# Patient Record
Sex: MALE | Race: WHITE | NOT HISPANIC OR LATINO | Employment: FULL TIME | URBAN - METROPOLITAN AREA
[De-identification: names, ages, dates, MRNs, and addresses within clinical notes are randomized per-mention and may not be internally consistent; named-entity substitution may affect disease eponyms.]

---

## 2022-05-09 ENCOUNTER — HOSPITAL ENCOUNTER (INPATIENT)
Facility: HOSPITAL | Age: 47
LOS: 11 days | Discharge: NURSING, REHAB OR LONG-TERM CARE | End: 2022-05-20
Attending: EMERGENCY MEDICINE | Admitting: INTERNAL MEDICINE

## 2022-05-09 ENCOUNTER — APPOINTMENT (OUTPATIENT)
Dept: GENERAL RADIOLOGY | Facility: HOSPITAL | Age: 47
End: 2022-05-09

## 2022-05-09 DIAGNOSIS — Z74.09 IMMOBILITY: ICD-10-CM

## 2022-05-09 DIAGNOSIS — L72.0 SUBCUTANEOUS CYSTS, GENERALIZED: ICD-10-CM

## 2022-05-09 DIAGNOSIS — M54.9 INTRACTABLE BACK PAIN: Primary | ICD-10-CM

## 2022-05-09 PROBLEM — E87.6 HYPOKALEMIA: Status: ACTIVE | Noted: 2022-05-09

## 2022-05-09 PROBLEM — F10.10 ETOH ABUSE: Status: ACTIVE | Noted: 2022-05-09

## 2022-05-09 PROBLEM — Z72.0 TOBACCO ABUSE: Status: ACTIVE | Noted: 2022-05-09

## 2022-05-09 PROBLEM — R12 HEART BURN: Status: ACTIVE | Noted: 2022-05-09

## 2022-05-09 LAB
ALBUMIN SERPL-MCNC: 3.5 G/DL (ref 3.5–5.2)
ALBUMIN/GLOB SERPL: 1.1 G/DL
ALP SERPL-CCNC: 117 U/L (ref 39–117)
ALT SERPL W P-5'-P-CCNC: 47 U/L (ref 1–41)
ANION GAP SERPL CALCULATED.3IONS-SCNC: 14.3 MMOL/L (ref 5–15)
AST SERPL-CCNC: 99 U/L (ref 1–40)
BASOPHILS # BLD AUTO: 0.05 10*3/MM3 (ref 0–0.2)
BASOPHILS NFR BLD AUTO: 0.6 % (ref 0–1.5)
BILIRUB SERPL-MCNC: 0.7 MG/DL (ref 0–1.2)
BUN SERPL-MCNC: 3 MG/DL (ref 6–20)
BUN/CREAT SERPL: 4.8 (ref 7–25)
CALCIUM SPEC-SCNC: 8.2 MG/DL (ref 8.6–10.5)
CHLORIDE SERPL-SCNC: 101 MMOL/L (ref 98–107)
CO2 SERPL-SCNC: 22.7 MMOL/L (ref 22–29)
CREAT SERPL-MCNC: 0.62 MG/DL (ref 0.76–1.27)
DEPRECATED RDW RBC AUTO: 49.1 FL (ref 37–54)
EGFRCR SERPLBLD CKD-EPI 2021: 118.6 ML/MIN/1.73
EOSINOPHIL # BLD AUTO: 0.08 10*3/MM3 (ref 0–0.4)
EOSINOPHIL NFR BLD AUTO: 1 % (ref 0.3–6.2)
ERYTHROCYTE [DISTWIDTH] IN BLOOD BY AUTOMATED COUNT: 14.1 % (ref 12.3–15.4)
GLOBULIN UR ELPH-MCNC: 3.1 GM/DL
GLUCOSE SERPL-MCNC: 80 MG/DL (ref 65–99)
HCT VFR BLD AUTO: 40.9 % (ref 37.5–51)
HGB BLD-MCNC: 14.8 G/DL (ref 13–17.7)
LYMPHOCYTES # BLD AUTO: 1.83 10*3/MM3 (ref 0.7–3.1)
LYMPHOCYTES NFR BLD AUTO: 22.6 % (ref 19.6–45.3)
MCH RBC QN AUTO: 35 PG (ref 26.6–33)
MCHC RBC AUTO-ENTMCNC: 36.2 G/DL (ref 31.5–35.7)
MCV RBC AUTO: 96.7 FL (ref 79–97)
MONOCYTES # BLD AUTO: 0.69 10*3/MM3 (ref 0.1–0.9)
MONOCYTES NFR BLD AUTO: 8.5 % (ref 5–12)
NEUTROPHILS NFR BLD AUTO: 5.4 10*3/MM3 (ref 1.7–7)
NEUTROPHILS NFR BLD AUTO: 66.9 % (ref 42.7–76)
PLATELET # BLD AUTO: 241 10*3/MM3 (ref 140–450)
PMV BLD AUTO: 9.5 FL (ref 6–12)
POTASSIUM SERPL-SCNC: 3.2 MMOL/L (ref 3.5–5.2)
PROT SERPL-MCNC: 6.6 G/DL (ref 6–8.5)
RBC # BLD AUTO: 4.23 10*6/MM3 (ref 4.14–5.8)
SODIUM SERPL-SCNC: 138 MMOL/L (ref 136–145)
WBC NRBC COR # BLD: 8.08 10*3/MM3 (ref 3.4–10.8)

## 2022-05-09 PROCEDURE — 25010000002 SODIUM CHLORIDE 0.9 % WITH KCL 20 MEQ 20-0.9 MEQ/L-% SOLUTION: Performed by: INTERNAL MEDICINE

## 2022-05-09 PROCEDURE — 72110 X-RAY EXAM L-2 SPINE 4/>VWS: CPT

## 2022-05-09 PROCEDURE — 80053 COMPREHEN METABOLIC PANEL: CPT | Performed by: PHYSICIAN ASSISTANT

## 2022-05-09 PROCEDURE — 25010000002 KETOROLAC TROMETHAMINE PER 15 MG: Performed by: PHYSICIAN ASSISTANT

## 2022-05-09 PROCEDURE — 25010000002 HYDROMORPHONE PER 4 MG: Performed by: INTERNAL MEDICINE

## 2022-05-09 PROCEDURE — 85025 COMPLETE CBC W/AUTO DIFF WBC: CPT | Performed by: PHYSICIAN ASSISTANT

## 2022-05-09 PROCEDURE — 99284 EMERGENCY DEPT VISIT MOD MDM: CPT

## 2022-05-09 RX ORDER — FAMOTIDINE 10 MG/ML
20 INJECTION, SOLUTION INTRAVENOUS EVERY 12 HOURS SCHEDULED
Status: DISCONTINUED | OUTPATIENT
Start: 2022-05-09 | End: 2022-05-15

## 2022-05-09 RX ORDER — SODIUM CHLORIDE AND POTASSIUM CHLORIDE 150; 900 MG/100ML; MG/100ML
100 INJECTION, SOLUTION INTRAVENOUS CONTINUOUS
Status: DISCONTINUED | OUTPATIENT
Start: 2022-05-09 | End: 2022-05-12

## 2022-05-09 RX ORDER — LORAZEPAM 1 MG/1
2 TABLET ORAL
Status: DISCONTINUED | OUTPATIENT
Start: 2022-05-09 | End: 2022-05-15

## 2022-05-09 RX ORDER — SODIUM CHLORIDE 0.9 % (FLUSH) 0.9 %
10 SYRINGE (ML) INJECTION AS NEEDED
Status: DISCONTINUED | OUTPATIENT
Start: 2022-05-09 | End: 2022-05-21 | Stop reason: HOSPADM

## 2022-05-09 RX ORDER — HYDROCODONE BITARTRATE AND ACETAMINOPHEN 7.5; 325 MG/1; MG/1
1 TABLET ORAL EVERY 4 HOURS PRN
Status: DISCONTINUED | OUTPATIENT
Start: 2022-05-09 | End: 2022-05-15

## 2022-05-09 RX ORDER — NICOTINE 21 MG/24HR
1 PATCH, TRANSDERMAL 24 HOURS TRANSDERMAL
Status: DISCONTINUED | OUTPATIENT
Start: 2022-05-10 | End: 2022-05-21 | Stop reason: HOSPADM

## 2022-05-09 RX ORDER — LORAZEPAM 2 MG/ML
2 INJECTION INTRAMUSCULAR
Status: DISCONTINUED | OUTPATIENT
Start: 2022-05-09 | End: 2022-05-15

## 2022-05-09 RX ORDER — SODIUM CHLORIDE 0.9 % (FLUSH) 0.9 %
10 SYRINGE (ML) INJECTION EVERY 12 HOURS SCHEDULED
Status: DISCONTINUED | OUTPATIENT
Start: 2022-05-09 | End: 2022-05-21 | Stop reason: HOSPADM

## 2022-05-09 RX ORDER — ACETAMINOPHEN 160 MG/5ML
650 SOLUTION ORAL EVERY 4 HOURS PRN
Status: DISCONTINUED | OUTPATIENT
Start: 2022-05-09 | End: 2022-05-21 | Stop reason: HOSPADM

## 2022-05-09 RX ORDER — ALUMINA, MAGNESIA, AND SIMETHICONE 2400; 2400; 240 MG/30ML; MG/30ML; MG/30ML
15 SUSPENSION ORAL EVERY 4 HOURS PRN
Status: DISCONTINUED | OUTPATIENT
Start: 2022-05-09 | End: 2022-05-21 | Stop reason: HOSPADM

## 2022-05-09 RX ORDER — ACETAMINOPHEN 650 MG/1
650 SUPPOSITORY RECTAL EVERY 4 HOURS PRN
Status: DISCONTINUED | OUTPATIENT
Start: 2022-05-09 | End: 2022-05-21 | Stop reason: HOSPADM

## 2022-05-09 RX ORDER — HYDROMORPHONE HYDROCHLORIDE 1 MG/ML
0.5 INJECTION, SOLUTION INTRAMUSCULAR; INTRAVENOUS; SUBCUTANEOUS
Status: DISCONTINUED | OUTPATIENT
Start: 2022-05-09 | End: 2022-05-15

## 2022-05-09 RX ORDER — ACETAMINOPHEN 325 MG/1
650 TABLET ORAL EVERY 4 HOURS PRN
Status: DISCONTINUED | OUTPATIENT
Start: 2022-05-09 | End: 2022-05-21 | Stop reason: HOSPADM

## 2022-05-09 RX ORDER — KETOROLAC TROMETHAMINE 30 MG/ML
30 INJECTION, SOLUTION INTRAMUSCULAR; INTRAVENOUS ONCE
Status: COMPLETED | OUTPATIENT
Start: 2022-05-09 | End: 2022-05-09

## 2022-05-09 RX ORDER — ONDANSETRON 2 MG/ML
4 INJECTION INTRAMUSCULAR; INTRAVENOUS EVERY 6 HOURS PRN
Status: DISCONTINUED | OUTPATIENT
Start: 2022-05-09 | End: 2022-05-21 | Stop reason: HOSPADM

## 2022-05-09 RX ORDER — CYCLOBENZAPRINE HCL 10 MG
10 TABLET ORAL ONCE
Status: COMPLETED | OUTPATIENT
Start: 2022-05-09 | End: 2022-05-09

## 2022-05-09 RX ORDER — CYCLOBENZAPRINE HCL 10 MG
10 TABLET ORAL EVERY 8 HOURS SCHEDULED
Status: DISCONTINUED | OUTPATIENT
Start: 2022-05-09 | End: 2022-05-11

## 2022-05-09 RX ORDER — LORAZEPAM 2 MG/ML
1 INJECTION INTRAMUSCULAR
Status: DISCONTINUED | OUTPATIENT
Start: 2022-05-09 | End: 2022-05-15

## 2022-05-09 RX ORDER — LORAZEPAM 1 MG/1
1 TABLET ORAL EVERY 6 HOURS
Status: COMPLETED | OUTPATIENT
Start: 2022-05-09 | End: 2022-05-10

## 2022-05-09 RX ORDER — NALOXONE HCL 0.4 MG/ML
0.4 VIAL (ML) INJECTION
Status: DISCONTINUED | OUTPATIENT
Start: 2022-05-09 | End: 2022-05-15

## 2022-05-09 RX ORDER — LORAZEPAM 1 MG/1
1 TABLET ORAL
Status: DISCONTINUED | OUTPATIENT
Start: 2022-05-09 | End: 2022-05-15

## 2022-05-09 RX ORDER — LORAZEPAM 1 MG/1
1 TABLET ORAL EVERY 8 HOURS
Status: DISPENSED | OUTPATIENT
Start: 2022-05-10 | End: 2022-05-11

## 2022-05-09 RX ADMIN — HYDROCODONE BITARTRATE AND ACETAMINOPHEN 1 TABLET: 7.5; 325 TABLET ORAL at 21:50

## 2022-05-09 RX ADMIN — LORAZEPAM 1 MG: 1 TABLET ORAL at 20:14

## 2022-05-09 RX ADMIN — POTASSIUM CHLORIDE AND SODIUM CHLORIDE 100 ML/HR: 900; 150 INJECTION, SOLUTION INTRAVENOUS at 21:50

## 2022-05-09 RX ADMIN — MAGNESIUM HYDROXIDE,ALUMINUM HYDROXICE,SIMETHICONE 15 ML: 240; 2400; 2400 SUSPENSION ORAL at 20:14

## 2022-05-09 RX ADMIN — KETOROLAC TROMETHAMINE 30 MG: 30 INJECTION, SOLUTION INTRAMUSCULAR at 12:16

## 2022-05-09 RX ADMIN — FAMOTIDINE 20 MG: 10 INJECTION INTRAVENOUS at 21:50

## 2022-05-09 RX ADMIN — CYCLOBENZAPRINE HYDROCHLORIDE 10 MG: 10 TABLET, FILM COATED ORAL at 12:15

## 2022-05-09 RX ADMIN — HYDROMORPHONE HYDROCHLORIDE 0.5 MG: 1 INJECTION, SOLUTION INTRAMUSCULAR; INTRAVENOUS; SUBCUTANEOUS at 20:14

## 2022-05-09 RX ADMIN — Medication 10 ML: at 21:50

## 2022-05-09 RX ADMIN — CYCLOBENZAPRINE 10 MG: 10 TABLET, FILM COATED ORAL at 21:50

## 2022-05-09 NOTE — ED NOTES
"Pt arrives via EMS from home with c/o back pain. Pt reports he injured it Saturday getting out of his car. Pt reports it radiates down the left leg. Per EMS, pt \"crawled\" to stretcher.     Pt given mask in triage, staff in PPE.    "

## 2022-05-09 NOTE — ED NOTES
Nursing report ED to floor  Bogdan Jeter  46 y.o.  male    HPI (triage note):   Chief Complaint   Patient presents with   • Back Pain       Admitting doctor:   Cony Damian MD    Admitting diagnosis:   The primary encounter diagnosis was Intractable back pain. A diagnosis of Immobility was also pertinent to this visit.    Code status:   Current Code Status     Date Active Code Status Order ID Comments User Context       Not on file    Advance Care Planning Activity          Allergies:   Patient has no known allergies.    Weight:   There were no vitals filed for this visit.    Most recent vitals:   Vitals:    05/09/22 0916   BP: 116/78   Pulse: 111   Resp: 16   Temp: 97.3 °F (36.3 °C)   SpO2: 96%       Active LDAs/IV Access:   Lines, Drains & Airways     Active LDAs     Name Placement date Placement time Site Days    Peripheral IV 05/09/22 1413 Left Antecubital 05/09/22  1413  Antecubital  less than 1                Labs (abnormal labs have a star):   Labs Reviewed   COMPREHENSIVE METABOLIC PANEL - Abnormal; Notable for the following components:       Result Value    BUN 3 (*)     Creatinine 0.62 (*)     Potassium 3.2 (*)     Calcium 8.2 (*)     ALT (SGPT) 47 (*)     AST (SGOT) 99 (*)     BUN/Creatinine Ratio 4.8 (*)     All other components within normal limits    Narrative:     GFR Normal >60  Chronic Kidney Disease <60  Kidney Failure <15     CBC WITH AUTO DIFFERENTIAL - Abnormal; Notable for the following components:    MCH 35.0 (*)     MCHC 36.2 (*)     All other components within normal limits   CBC AND DIFFERENTIAL    Narrative:     The following orders were created for panel order CBC & Differential.  Procedure                               Abnormality         Status                     ---------                               -----------         ------                     CBC Auto Differential[[id number]]        Abnormal            Final result                 Please view results for these tests on the  individual orders.       EKG:   No orders to display       Meds given in ED:   Medications   sodium chloride 0.9 % flush 10 mL (has no administration in time range)   ketorolac (TORADOL) injection 30 mg (30 mg Intramuscular Given 5/9/22 1216)   cyclobenzaprine (FLEXERIL) tablet 10 mg (10 mg Oral Given 5/9/22 1215)       Imaging results:  XR Spine Lumbar Complete 4+VW    Result Date: 5/9/2022  Atheromatous vascular calcification. There is degenerative facet joint change L5-S1 without subluxation. Otherwise negative lumbar x-rays.  This report was finalized on 5/9/2022 11:38 AM by Dr. Kehinde Dudley M.D.        Ambulatory status:   - assist x2    Social issues:   Social History     Socioeconomic History   • Marital status: Single

## 2022-05-09 NOTE — ED PROVIDER NOTES
MD ATTESTATION NOTE    The AISHWARYA and I have discussed this patient's history, physical exam, and treatment plan.  I have reviewed the documentation and personally had a face to face interaction with the patient. I affirm the documentation and agree with the treatment and plan.  The attached note describes my personal findings.      I provided a substantive portion of the care of the patient.  I personally performed the physical exam in its entirety, and below are my findings.  For this patient encounter, the patient wore surgical mask, I wore full protective PPE including N95 and eye protection.      Brief HPI: Patient complains of left lower back pain for the past 2 days.  Pain began as he was trying to get out of his car.  Pain radiates into the left leg.  Complains of tingling in his left leg but denies leg weakness, loss of bowel/bladder control, saddle anesthesia, fever, abdominal pain, or prior back surgery.  He is not on anticoagulants.  Pain is worse with movement.    PHYSICAL EXAM  ED Triage Vitals [05/09/22 0916]   Temp Heart Rate Resp BP SpO2   97.3 °F (36.3 °C) 111 16 116/78 96 %      Temp src Heart Rate Source Patient Position BP Location FiO2 (%)   -- -- -- -- --         GENERAL: Awake, alert, oriented x3.  No acute distress  HENT: nares patent  EYES: no scleral icterus  CV: regular rhythm, normal rate  RESPIRATORY: normal effort, clear to auscultation bilaterally  ABDOMEN: soft, nontender  MUSCULOSKELETAL: There is tenderness over the left lumbar paraspinal muscles and left SI joint.  T and L-spine are nontender.  NEURO: Normal strength and light touch sensation in both lower extremities.  No saddle anesthesia.  Straight leg raises positive on the left at 45 degrees.  PSYCH:  calm, cooperative  SKIN: warm, dry    Vital signs and nursing notes reviewed.        Plan: X-rays of lumbar spine showed mild degenerative changes.  Patient's symptoms are consistent with sciatica.  Plan is for symptomatic  treatment.    1340: Patient was able to stand with assistance but was unable to ambulate due to pain.  He will be admitted.       Aldo Levy MD  05/09/22 0907

## 2022-05-09 NOTE — ED PROVIDER NOTES
EMERGENCY DEPARTMENT ENCOUNTER    Room Number:  A03/03  Date seen:  5/9/2022  Time seen: 12:00 EDT  PCP: Provider, No Known  Historian: patient      HPI:  Chief Complaint: back pain    Context: Bogdan Jeter is a 46 y.o. male who presents to the ED for evaluation of back pain.  He states 2 days ago he reached to 1 side to try to get out of his car when he got intense pain in his left low back.  He states the pain has gotten progressively worse over the last 2 days.  He states the pain is shooting down his left leg to his knee.  He denies any numbness in the left leg or buttock region.  He denies any loss of bowel or bladder control.  Patient states he lives on the third floor of an apartment building and had to crawl down steps today to get picked up by an ambulance.  He denies any history of back surgeries or chronic back pain.  He denies any recent trauma or falls.      PAST MEDICAL HISTORY  Active Ambulatory Problems     Diagnosis Date Noted   • No Active Ambulatory Problems     Resolved Ambulatory Problems     Diagnosis Date Noted   • No Resolved Ambulatory Problems     No Additional Past Medical History         PAST SURGICAL HISTORY  No past surgical history on file.      FAMILY HISTORY  No family history on file.      SOCIAL HISTORY  Social History     Socioeconomic History   • Marital status: Single         ALLERGIES  Patient has no known allergies.        REVIEW OF SYSTEMS  Review of Systems   Constitutional: Negative for chills and fever.   Respiratory: Negative for cough and shortness of breath.    Cardiovascular: Negative for chest pain.   Gastrointestinal: Negative for abdominal pain, nausea and vomiting.   Genitourinary: Negative for flank pain and frequency.   Musculoskeletal: Positive for back pain.   Skin: Negative for color change.   Neurological: Negative for weakness and numbness.      All systems reviewed and negative except for those discussed in HPI.       PHYSICAL EXAM  ED Triage Vitals  [05/09/22 0916]   Temp Heart Rate Resp BP SpO2   97.3 °F (36.3 °C) 111 16 116/78 96 %      Temp src Heart Rate Source Patient Position BP Location FiO2 (%)   -- -- -- -- --         GENERAL: Moderate distress due to pain.  HENT: atraumatic  EYES: no scleral icterus  CV: regular rhythm, regular rate  RESPIRATORY: normal effort  ABDOMEN: soft, nontender  MUSCULOSKELETAL: no deformity.  No midline C, T, or L-spine tenderness.  Tender in the left low back and buttock region.  Full sensation to light touch bilateral lower extremities.  Decreased extension of left leg due to pain.  Patient unable to bear weight on left leg and ambulate due to pain.  Positive straight leg raise  NEURO: alert, moves all extremities, follows commands  SKIN: warm, dry    Vital signs and nursing notes reviewed.          LAB RESULTS  Recent Results (from the past 24 hour(s))   Comprehensive Metabolic Panel    Collection Time: 05/09/22  2:14 PM    Specimen: Blood   Result Value Ref Range    Glucose 80 65 - 99 mg/dL    BUN 3 (L) 6 - 20 mg/dL    Creatinine 0.62 (L) 0.76 - 1.27 mg/dL    Sodium 138 136 - 145 mmol/L    Potassium 3.2 (L) 3.5 - 5.2 mmol/L    Chloride 101 98 - 107 mmol/L    CO2 22.7 22.0 - 29.0 mmol/L    Calcium 8.2 (L) 8.6 - 10.5 mg/dL    Total Protein 6.6 6.0 - 8.5 g/dL    Albumin 3.50 3.50 - 5.20 g/dL    ALT (SGPT) 47 (H) 1 - 41 U/L    AST (SGOT) 99 (H) 1 - 40 U/L    Alkaline Phosphatase 117 39 - 117 U/L    Total Bilirubin 0.7 0.0 - 1.2 mg/dL    Globulin 3.1 gm/dL    A/G Ratio 1.1 g/dL    BUN/Creatinine Ratio 4.8 (L) 7.0 - 25.0    Anion Gap 14.3 5.0 - 15.0 mmol/L    eGFR 118.6 >60.0 mL/min/1.73   CBC Auto Differential    Collection Time: 05/09/22  2:14 PM    Specimen: Blood   Result Value Ref Range    WBC 8.08 3.40 - 10.80 10*3/mm3    RBC 4.23 4.14 - 5.80 10*6/mm3    Hemoglobin 14.8 13.0 - 17.7 g/dL    Hematocrit 40.9 37.5 - 51.0 %    MCV 96.7 79.0 - 97.0 fL    MCH 35.0 (H) 26.6 - 33.0 pg    MCHC 36.2 (H) 31.5 - 35.7 g/dL    RDW 14.1  12.3 - 15.4 %    RDW-SD 49.1 37.0 - 54.0 fl    MPV 9.5 6.0 - 12.0 fL    Platelets 241 140 - 450 10*3/mm3    Neutrophil % 66.9 42.7 - 76.0 %    Lymphocyte % 22.6 19.6 - 45.3 %    Monocyte % 8.5 5.0 - 12.0 %    Eosinophil % 1.0 0.3 - 6.2 %    Basophil % 0.6 0.0 - 1.5 %    Neutrophils, Absolute 5.40 1.70 - 7.00 10*3/mm3    Lymphocytes, Absolute 1.83 0.70 - 3.10 10*3/mm3    Monocytes, Absolute 0.69 0.10 - 0.90 10*3/mm3    Eosinophils, Absolute 0.08 0.00 - 0.40 10*3/mm3    Basophils, Absolute 0.05 0.00 - 0.20 10*3/mm3       Ordered the above labs and independently reviewed the results.        RADIOLOGY  XR Spine Lumbar Complete 4+VW    Result Date: 5/9/2022  Narrative: LUMBAR SPINE X-RAYS  HISTORY: Back pain following injury.  TECHNIQUE: Five x-rays of the lumbosacral spine are provided. There is no previous imaging for correlation.  FINDINGS: There is atheromatous vascular calcification along the abdominal aorta. Lumbar vertebral body height, alignment, and disc height are normal. No pars defect or bone lesion is present. The visualized upper pelvic bones and joints appear normal and the bowel gas pattern appears normal.      Impression: Atheromatous vascular calcification. There is degenerative facet joint change L5-S1 without subluxation. Otherwise negative lumbar x-rays.  This report was finalized on 5/9/2022 11:38 AM by Dr. Kehinde Dudley M.D.        I ordered the above noted radiological studies. Reviewed by me and discussed with radiologist.  See dictation for official radiology interpretation.    MEDICATIONS GIVEN IN ER  Medications   sodium chloride 0.9 % flush 10 mL (has no administration in time range)   ketorolac (TORADOL) injection 30 mg (30 mg Intramuscular Given 5/9/22 1216)   cyclobenzaprine (FLEXERIL) tablet 10 mg (10 mg Oral Given 5/9/22 1215)       MEDICAL RECORD REVIEW  I reviewed the patient's records      PROGRESS, DATA ANALYSIS, CONSULTS, AND MEDICAL DECISION MAKING    All labs have been  independently reviewed by me.  All radiology studies have been reviewed by me. Discussion below represents my analysis of pertinent findings related to patient's condition, differential diagnosis, treatment plan and final disposition.    DDX includes but not limited to number radiculopathy, sciatica, disc herniation, compression fracture, lumbar strain, muscle spasm.      ED Course as of 05/09/22 1700   Mon May 09, 2022   1317 Recheck.  He has had some improvement in pain with medication.  Dr. Levy saw the patient with me at bedside.  We will get him up and try to ambulate to see if he is able to be discharged. [AH]   1326 Patient was unable to get out of bed with assistance but unable to bear weight on left leg or walk. [AH]   1345 Patient rechecked.  As he was unable to bear weight or ambulate he will not be able to go home.  Patient lives on the third floor of an apartment building and had to crawl down the steps today to get picked up by an ambulance. [AH]   1421 Discussed the patient with Dr. Damian who agrees to admit to a [facility] bed. []      ED Course User Index  [AH] Angelika Subramanian PA           Reviewed pt's history and workup with Dr. Levy.  After a bedside evaluation; they agree with the plan of care      Patient's disposition is admission.    Patient was placed in face mask in first look. Patient was wearing facemask each time I entered the room and throughout our encounter. I wore full protective equipment throughout this patient encounter including a face mask, eye shield, gown and gloves. Hand hygiene was performed before donning protective equipment and after removal when leaving the room.        DIAGNOSIS  Final diagnoses:   Intractable back pain   Immobility           Latest Documented Vital Signs:  As of 17:00 EDT  BP- 131/80 HR- 84 Temp- 97.3 °F (36.3 °C) O2 sat- 95%         Angelika Subramanian PA  05/09/22 1700

## 2022-05-10 ENCOUNTER — APPOINTMENT (OUTPATIENT)
Dept: MRI IMAGING | Facility: HOSPITAL | Age: 47
End: 2022-05-10

## 2022-05-10 PROBLEM — F10.939 ALCOHOL WITHDRAWAL: Status: ACTIVE | Noted: 2022-05-09

## 2022-05-10 LAB
ALBUMIN SERPL-MCNC: 3.2 G/DL (ref 3.5–5.2)
ALBUMIN/GLOB SERPL: 1.1 G/DL
ALP SERPL-CCNC: 126 U/L (ref 39–117)
ALT SERPL W P-5'-P-CCNC: 44 U/L (ref 1–41)
ANION GAP SERPL CALCULATED.3IONS-SCNC: 11.8 MMOL/L (ref 5–15)
AST SERPL-CCNC: 69 U/L (ref 1–40)
BASOPHILS # BLD AUTO: 0.06 10*3/MM3 (ref 0–0.2)
BASOPHILS NFR BLD AUTO: 0.8 % (ref 0–1.5)
BILIRUB SERPL-MCNC: 1.3 MG/DL (ref 0–1.2)
BUN SERPL-MCNC: 7 MG/DL (ref 6–20)
BUN/CREAT SERPL: 9 (ref 7–25)
CALCIUM SPEC-SCNC: 8.2 MG/DL (ref 8.6–10.5)
CHLORIDE SERPL-SCNC: 101 MMOL/L (ref 98–107)
CO2 SERPL-SCNC: 25.2 MMOL/L (ref 22–29)
CREAT SERPL-MCNC: 0.78 MG/DL (ref 0.76–1.27)
CRP SERPL-MCNC: 0.45 MG/DL (ref 0–0.5)
DEPRECATED RDW RBC AUTO: 45.5 FL (ref 37–54)
EGFRCR SERPLBLD CKD-EPI 2021: 110.7 ML/MIN/1.73
EOSINOPHIL # BLD AUTO: 0.03 10*3/MM3 (ref 0–0.4)
EOSINOPHIL NFR BLD AUTO: 0.4 % (ref 0.3–6.2)
ERYTHROCYTE [DISTWIDTH] IN BLOOD BY AUTOMATED COUNT: 13.4 % (ref 12.3–15.4)
GLOBULIN UR ELPH-MCNC: 3 GM/DL
GLUCOSE SERPL-MCNC: 83 MG/DL (ref 65–99)
HCT VFR BLD AUTO: 38.5 % (ref 37.5–51)
HGB BLD-MCNC: 13.9 G/DL (ref 13–17.7)
IMM GRANULOCYTES # BLD AUTO: 0.02 10*3/MM3 (ref 0–0.05)
IMM GRANULOCYTES NFR BLD AUTO: 0.3 % (ref 0–0.5)
LYMPHOCYTES # BLD AUTO: 1.35 10*3/MM3 (ref 0.7–3.1)
LYMPHOCYTES NFR BLD AUTO: 18.3 % (ref 19.6–45.3)
MCH RBC QN AUTO: 34 PG (ref 26.6–33)
MCHC RBC AUTO-ENTMCNC: 36.1 G/DL (ref 31.5–35.7)
MCV RBC AUTO: 94.1 FL (ref 79–97)
MONOCYTES # BLD AUTO: 0.65 10*3/MM3 (ref 0.1–0.9)
MONOCYTES NFR BLD AUTO: 8.8 % (ref 5–12)
NEUTROPHILS NFR BLD AUTO: 5.25 10*3/MM3 (ref 1.7–7)
NEUTROPHILS NFR BLD AUTO: 71.4 % (ref 42.7–76)
NRBC BLD AUTO-RTO: 0.1 /100 WBC (ref 0–0.2)
PLATELET # BLD AUTO: 215 10*3/MM3 (ref 140–450)
PMV BLD AUTO: 9.7 FL (ref 6–12)
POTASSIUM SERPL-SCNC: 3.8 MMOL/L (ref 3.5–5.2)
PROT SERPL-MCNC: 6.2 G/DL (ref 6–8.5)
RBC # BLD AUTO: 4.09 10*6/MM3 (ref 4.14–5.8)
SARS-COV-2 ORF1AB RESP QL NAA+PROBE: NOT DETECTED
SODIUM SERPL-SCNC: 138 MMOL/L (ref 136–145)
WBC NRBC COR # BLD: 7.36 10*3/MM3 (ref 3.4–10.8)

## 2022-05-10 PROCEDURE — 25010000002 LORAZEPAM PER 2 MG: Performed by: INTERNAL MEDICINE

## 2022-05-10 PROCEDURE — 80053 COMPREHEN METABOLIC PANEL: CPT | Performed by: INTERNAL MEDICINE

## 2022-05-10 PROCEDURE — U0004 COV-19 TEST NON-CDC HGH THRU: HCPCS | Performed by: INTERNAL MEDICINE

## 2022-05-10 PROCEDURE — A9577 INJ MULTIHANCE: HCPCS | Performed by: INTERNAL MEDICINE

## 2022-05-10 PROCEDURE — 25010000002 HYDROMORPHONE PER 4 MG: Performed by: INTERNAL MEDICINE

## 2022-05-10 PROCEDURE — 0 GADOBENATE DIMEGLUMINE 529 MG/ML SOLUTION: Performed by: INTERNAL MEDICINE

## 2022-05-10 PROCEDURE — 85025 COMPLETE CBC W/AUTO DIFF WBC: CPT | Performed by: INTERNAL MEDICINE

## 2022-05-10 PROCEDURE — 86140 C-REACTIVE PROTEIN: CPT | Performed by: INTERNAL MEDICINE

## 2022-05-10 PROCEDURE — 72158 MRI LUMBAR SPINE W/O & W/DYE: CPT

## 2022-05-10 PROCEDURE — 25010000002 ONDANSETRON PER 1 MG: Performed by: INTERNAL MEDICINE

## 2022-05-10 RX ORDER — CHLORDIAZEPOXIDE HYDROCHLORIDE 10 MG/1
10 CAPSULE, GELATIN COATED ORAL EVERY 8 HOURS SCHEDULED
Status: DISCONTINUED | OUTPATIENT
Start: 2022-05-10 | End: 2022-05-10 | Stop reason: DRUGHIGH

## 2022-05-10 RX ORDER — CHLORDIAZEPOXIDE HYDROCHLORIDE 25 MG/1
25 CAPSULE, GELATIN COATED ORAL EVERY 6 HOURS SCHEDULED
Status: DISCONTINUED | OUTPATIENT
Start: 2022-05-10 | End: 2022-05-11

## 2022-05-10 RX ORDER — LIDOCAINE 50 MG/G
2 PATCH TOPICAL
Status: DISCONTINUED | OUTPATIENT
Start: 2022-05-10 | End: 2022-05-21 | Stop reason: HOSPADM

## 2022-05-10 RX ADMIN — LORAZEPAM 2 MG: 2 INJECTION INTRAMUSCULAR; INTRAVENOUS at 20:56

## 2022-05-10 RX ADMIN — HYDROCODONE BITARTRATE AND ACETAMINOPHEN 1 TABLET: 7.5; 325 TABLET ORAL at 12:17

## 2022-05-10 RX ADMIN — LORAZEPAM 2 MG: 1 TABLET ORAL at 17:55

## 2022-05-10 RX ADMIN — LORAZEPAM 2 MG: 2 INJECTION INTRAMUSCULAR; INTRAVENOUS at 21:26

## 2022-05-10 RX ADMIN — NICOTINE 1 PATCH: 21 PATCH, EXTENDED RELEASE TRANSDERMAL at 16:55

## 2022-05-10 RX ADMIN — HYDROMORPHONE HYDROCHLORIDE 0.5 MG: 1 INJECTION, SOLUTION INTRAMUSCULAR; INTRAVENOUS; SUBCUTANEOUS at 19:55

## 2022-05-10 RX ADMIN — LORAZEPAM 2 MG: 2 INJECTION INTRAMUSCULAR; INTRAVENOUS at 20:37

## 2022-05-10 RX ADMIN — Medication 10 ML: at 08:12

## 2022-05-10 RX ADMIN — LORAZEPAM 1 MG: 1 TABLET ORAL at 08:11

## 2022-05-10 RX ADMIN — GADOBENATE DIMEGLUMINE 15 ML: 529 INJECTION, SOLUTION INTRAVENOUS at 11:33

## 2022-05-10 RX ADMIN — Medication 10 ML: at 20:12

## 2022-05-10 RX ADMIN — LORAZEPAM 1 MG: 1 TABLET ORAL at 14:24

## 2022-05-10 RX ADMIN — ONDANSETRON 4 MG: 2 INJECTION INTRAMUSCULAR; INTRAVENOUS at 20:45

## 2022-05-10 RX ADMIN — CYCLOBENZAPRINE 10 MG: 10 TABLET, FILM COATED ORAL at 13:34

## 2022-05-10 RX ADMIN — Medication 100 MG: at 08:12

## 2022-05-10 RX ADMIN — LORAZEPAM 2 MG: 2 INJECTION INTRAMUSCULAR; INTRAVENOUS at 19:45

## 2022-05-10 RX ADMIN — FAMOTIDINE 20 MG: 10 INJECTION INTRAVENOUS at 21:27

## 2022-05-10 RX ADMIN — CYCLOBENZAPRINE 10 MG: 10 TABLET, FILM COATED ORAL at 06:06

## 2022-05-10 RX ADMIN — NICOTINE 1 PATCH: 21 PATCH, EXTENDED RELEASE TRANSDERMAL at 08:12

## 2022-05-10 RX ADMIN — HYDROCODONE BITARTRATE AND ACETAMINOPHEN 1 TABLET: 7.5; 325 TABLET ORAL at 16:23

## 2022-05-10 RX ADMIN — LORAZEPAM 1 MG: 1 TABLET ORAL at 03:30

## 2022-05-10 RX ADMIN — LIDOCAINE 2 PATCH: 50 PATCH TOPICAL at 14:24

## 2022-05-10 RX ADMIN — FAMOTIDINE 20 MG: 10 INJECTION INTRAVENOUS at 08:12

## 2022-05-10 RX ADMIN — HYDROCODONE BITARTRATE AND ACETAMINOPHEN 1 TABLET: 7.5; 325 TABLET ORAL at 08:11

## 2022-05-10 RX ADMIN — CHLORDIAZEPOXIDE HYDROCHLORIDE 25 MG: 25 CAPSULE ORAL at 18:50

## 2022-05-10 RX ADMIN — HYDROMORPHONE HYDROCHLORIDE 0.5 MG: 1 INJECTION, SOLUTION INTRAMUSCULAR; INTRAVENOUS; SUBCUTANEOUS at 10:54

## 2022-05-10 RX ADMIN — HYDROCODONE BITARTRATE AND ACETAMINOPHEN 1 TABLET: 7.5; 325 TABLET ORAL at 03:30

## 2022-05-10 NOTE — PLAN OF CARE
Goal Outcome Evaluation:   Pt alert and oriented with confusion on the month. Pt CIWA went from 6 early in the shift to 15. Ativan given as ordered. Pt started on Librium for withdrawels. Back pain controlled with oral pain meds and one IV dose. This shift. Pt had an unwitnessed fall in which patient says he slipped on his knee. No injury noted. Physcian made aware and safe report filled out.

## 2022-05-10 NOTE — H&P
Internal medicine history and physical  INTERNAL MEDICINE   [facility]       Patient Identification:  Name: Bogdan Jeter  Age: 46 y.o.  Sex: male  :  1975  MRN: [id number]                   Primary Care Physician: Provider, No Known                               Date of admission:2022    Chief Complaint: Progressive worsening pain and discomfort in the lower back with weakness in the left leg since Saturday afternoon started as he was getting out of the car.    History of Present Illness:   Patient is a 46-year-old male who works at the [employer] and has no prior history of back pain or any other medical issues and does not take any medications on regular basis and admits to have habit of smoking about a pack per day and drinks about 6 beers a day and last drink was last night was in his usual state of his health when he went to work on Saturday.  He carried out his Duties at the [employer] on Saturday and after His Work Was Finished He Got into the Car without Any Problem.  After He Arrived Home and As He Was Getting Out Of the Car He Felt Discomfort in His Left Lower Back Which Has Been Progressively Getting Worse to the Point That He Cannot Move or Ambulate.  It Has Reached the Point That He Had to Call Ambulance.  Patient Lives on the Third Floor Apartment and He Was Scooting down until EMS Personnel Met Him and Getting Him to the Ambulance and Brought Him to the Hospital.  Patient Denies Any Incontinence Denies Any Loss of Control of His Bowels.  He Claims That the Pain Radiates into His Left Thigh and Outer Thigh.  Work-Up in the Emergency Room Included X-Ray of His Lumbar Spine That Showed Degenerative Changes in L5 and S1 Region with No Acute Fracture.  Patient Was Given Muscle Relaxants and Pain Medications without Any Improvement.  Patient Is Being Admitted for the Management of Intractable Low Back Pain and Further Evaluation.        Past Medical History:  No past medical  history on file.  Past Surgical History:  No past surgical history on file.   Home Meds:  No medications prior to admission.     Current Meds:     Current Facility-Administered Medications:   •  acetaminophen (TYLENOL) tablet 650 mg, 650 mg, Oral, Q4H PRN **OR** acetaminophen (TYLENOL) 160 MG/5ML solution 650 mg, 650 mg, Oral, Q4H PRN **OR** acetaminophen (TYLENOL) suppository 650 mg, 650 mg, Rectal, Q4H PRN, Cony Damian MD  •  cyclobenzaprine (FLEXERIL) tablet 10 mg, 10 mg, Oral, Q8H, Cony Damian MD  •  HYDROcodone-acetaminophen (NORCO) 7.5-325 MG per tablet 1 tablet, 1 tablet, Oral, Q4H PRN, Cony Damian MD  •  HYDROmorphone (DILAUDID) injection 0.5 mg, 0.5 mg, Intravenous, Q2H PRN **AND** naloxone (NARCAN) injection 0.4 mg, 0.4 mg, Intravenous, Q5 Min PRN, Cony Damian MD  •  ondansetron (ZOFRAN) injection 4 mg, 4 mg, Intravenous, Q6H PRN, Cony Damian MD  •  [COMPLETED] Insert peripheral IV, , , Once **AND** sodium chloride 0.9 % flush 10 mL, 10 mL, Intravenous, PRN, Cony Damian MD  •  sodium chloride 0.9 % flush 10 mL, 10 mL, Intravenous, Q12H, Cony Damian MD  •  sodium chloride 0.9 % flush 10 mL, 10 mL, Intravenous, PRN, Cony Damian MD  •  sodium chloride 0.9 % with KCl 20 mEq/L infusion, 100 mL/hr, Intravenous, Continuous, Cony Damian MD  Allergies:  No Known Allergies  Social History:   Social History     Tobacco Use   • Smoking status: Current Every Day Smoker     Packs/day: 1.00     Years: 15.00     Pack years: 15.00     Types: Cigarettes   • Smokeless tobacco: Not on file   Substance Use Topics   • Alcohol use: Yes     Alcohol/week: 5.0 standard drinks     Types: 5 Cans of beer per week     Comment: daily      Family History:  No family history on file.       Review of Systems  See history of present illness and past medical history.    Constitutional: Remarkable for no fever or chills  Cardiovascular: Remarkable for no chest pain or shortness of breath  GI: Remarkable for no nausea  "vomiting or diarrhea complaining of some heartburn which she has had issues before.  : Remarkable for no burning urination frequency or urgency  Musculoskeletal: Remarkable for back pain with pain in his left thigh radiating into his [place] side-impact.  Neurological: Remarkable for no loss of consciousness or continence.  Remainder of ROS is negative.      Vitals:   BP (!) 164/103 (BP Location: Left arm, Patient Position: Lying)   Pulse 112   Temp 98.9 °F (37.2 °C) (Oral)   Resp 18   Ht 180.3 cm (71\")   Wt 72.6 kg (160 lb)   SpO2 98%   BMI 22.32 kg/m²   I/O: No intake or output data in the 24 hours ending 05/09/22 2002  Exam:  Patient is examined using the personal protective equipment as per guidelines from infection control for this particular patient as enacted.  Hand washing was performed before and after patient interaction.  General Appearance:    Alert, cooperative, no distress, appears stated age   Head:    Normocephalic, without obvious abnormality, atraumatic   Eyes:    PERRL, conjunctiva/corneas clear, EOM's intact, both eyes   Ears:    Normal external ear canals, both ears   Nose:   Nares normal, septum midline, mucosa normal, no drainage    or sinus tenderness   Throat:   Lips, tongue, gums normal; oral mucosa pink and moist   Neck:   Supple, symmetrical, trachea midline, no adenopathy;     thyroid:  no enlargement/tenderness/nodules; no carotid    bruit or JVD   Back:     Symmetric, no curvature, ROM normal, no CVA tenderness   Lungs:     Clear to auscultation bilaterally, respirations unlabored   Chest Wall:    No tenderness or deformity    Heart:    Regular rate and rhythm, S1 and S2 normal, no murmur, rub   or gallop   Abdomen:    Soft nontender   Extremities:   Extremities normal, atraumatic, no cyanosis or edema   Pulses:   Pulses palpable in all extremities; symmetric all extremities   Skin:   Skin color normal, Skin is warm and dry,  no rashes or palpable lesions   Neurologic:  " Straight leg raising test on the left side was positive.  Weakness of her left thigh because of discomfort in his back.       Data Review:      I reviewed the patient's new clinical results.  Results from last 7 days   Lab Units 05/09/22  1414   WBC 10*3/mm3 8.08   HEMOGLOBIN g/dL 14.8   PLATELETS 10*3/mm3 241     Results from last 7 days   Lab Units 05/09/22  1414   SODIUM mmol/L 138   POTASSIUM mmol/L 3.2*   CHLORIDE mmol/L 101   CO2 mmol/L 22.7   BUN mg/dL 3*   CREATININE mg/dL 0.62*   CALCIUM mg/dL 8.2*   GLUCOSE mg/dL 80     Microbiology Results (last 10 days)     ** No results found for the last 240 hours. **      XR Spine Lumbar Complete 4+VW    Result Date: 5/9/2022  Atheromatous vascular calcification. There is degenerative facet joint change L5-S1 without subluxation. Otherwise negative lumbar x-rays.  This report was finalized on 5/9/2022 11:38 AM by Dr. Kehinde Dudley M.D.          Assessment:  Active Hospital Problems    Diagnosis  POA   • **Intractable back pain [M54.9]  Yes   • Hypokalemia [E87.6]  Unknown   • Tobacco abuse [Z72.0]  Unknown   • ETOH abuse [F10.10]  Unknown   • Heart burn [R12]  Unknown       Plan: See admitting orders  · Manage his pain and provide him with muscle relaxants  · MRI of his lumbar spine  · Orthospine consultation  · Manages heartburn with as needed Mylanta and provide him with Pepcid.  · Thiamine and watch for alcohol withdrawal  · OT PT evaluation    Cony Damian MD   5/9/2022  20:02 EDT  Much of this encounter note is an electronic transcription/translation of spoken language to printed text. The electronic translation of spoken language may permit erroneous, or at times, nonsensical words or phrases to be inadvertently transcribed; Although I have reviewed the note for such errors, some may still exist

## 2022-05-10 NOTE — PLAN OF CARE
Goal Outcome Evaluation:  Plan of Care Reviewed With: patient        Progress: improving  Outcome Evaluation: Patient came in with c/o Intractable back pain, unable to ambulate for x2 days, patient denied of numbness and tingling at this time.  Patient alert and oriented x4, able to make all needs known to staff, skin w/d to touch, respiration E/U, patient maintaining saturation above 90% on RA, VSS remind stable. Afebrile. continues to administered prn pain med as ordered, contiues to monitor CIWA as ordered. patient on christian ativan q6hr. patient is able to void well per urinal. stay NPO with sip of water with med.  cotinues on IV fluids with 20k, call bell with in easy reach.     Nurse  call and notify nurse of a call she receive from Dr Moreland, that he will not be able to see patient today, so Dr Miner want RN to call Dr Rickie MANRIQUE to consult neurosurgeon on patient. Night shift RN pass message on to Day shift RN.

## 2022-05-10 NOTE — PROGRESS NOTES
Name: Bogdan Jeter ADMIT: 2022   : 1975  PCP: Provider, No Known    MRN: [id number] LOS: 1 days   AGE/SEX: 46 y.o. male  ROOM: [facility]     Subjective   Subjective   Resting in bed. No family present. States he hurt his back when getting out of the car- twisted and then had low back pain with radiation to the left leg. States he has some numbness to the left shin. No loss of bowel or bladder. Having trouble walking. Denies any chest pain, dyspnea, cough, fever or chills. Drinks 6-7 beers a day. Prior history of withdrawal but no reported seizures. Denies any nausea or vomiting at this time. No headache or confusion.     Objective   Objective   Vital Signs  Temp:  [98.1 °F (36.7 °C)-98.9 °F (37.2 °C)] 98.1 °F (36.7 °C)  Heart Rate:  [] 122  Resp:  [16-18] 16  BP: (131-170)/() 170/107  SpO2:  [94 %-100 %] 100 %  on   ;   Device (Oxygen Therapy): room air  Body mass index is 22.32 kg/m².     Physical Exam  Vitals and nursing note reviewed.   Constitutional:       Appearance: He is ill-appearing. He is not toxic-appearing.   HENT:      Head: Normocephalic and atraumatic.   Cardiovascular:      Rate and Rhythm: Normal rate and regular rhythm.      Pulses: Normal pulses.      Heart sounds: Normal heart sounds.   Pulmonary:      Effort: Pulmonary effort is normal. No respiratory distress.      Comments: Diminished on RA  Abdominal:      General: Bowel sounds are normal. There is no distension.      Palpations: Abdomen is soft.      Tenderness: There is no abdominal tenderness.   Musculoskeletal:         General: Tenderness present. No swelling.      Cervical back: Normal range of motion and neck supple.      Comments: Slowed ROM to LLE   Skin:     General: Skin is warm and dry.      Findings: No bruising.      Comments: Multiple tattoos   Neurological:      Mental Status: He is alert and oriented to person, place, and time.      Sensory: Sensory deficit present.      Motor: Weakness present.       Coordination: Coordination normal.      Comments: Mild tremors on exam   Psychiatric:         Behavior: Behavior normal.      Comments: Slightly irritable, but cooperative.       Results Review:       I reviewed the patient's new clinical results.  Results from last 7 days   Lab Units 05/10/22  0459 05/09/22  1414   WBC 10*3/mm3 7.36 8.08   HEMOGLOBIN g/dL 13.9 14.8   PLATELETS 10*3/mm3 215 241     Results from last 7 days   Lab Units 05/10/22  0459 05/09/22  1414   SODIUM mmol/L 138 138   POTASSIUM mmol/L 3.8 3.2*   CHLORIDE mmol/L 101 101   CO2 mmol/L 25.2 22.7   BUN mg/dL 7 3*   CREATININE mg/dL 0.78 0.62*   GLUCOSE mg/dL 83 80   Estimated Creatinine Clearance: 120.2 mL/min (by C-G formula based on SCr of 0.78 mg/dL).  Results from last 7 days   Lab Units 05/10/22  0459 05/09/22  1414   ALBUMIN g/dL 3.20* 3.50   BILIRUBIN mg/dL 1.3* 0.7   ALK PHOS U/L 126* 117   AST (SGOT) U/L 69* 99*   ALT (SGPT) U/L 44* 47*     Results from last 7 days   Lab Units 05/10/22  0459 05/09/22  1414   CALCIUM mg/dL 8.2* 8.2*   ALBUMIN g/dL 3.20* 3.50       No results found for: HGBA1C, POCGLU    cyclobenzaprine, 10 mg, Oral, Q8H  famotidine, 20 mg, Intravenous, Q12H  lidocaine, 2 patch, Transdermal, Q24H  LORazepam, 1 mg, Oral, Q6H   Followed by  LORazepam, 1 mg, Oral, Q8H  nicotine, 1 patch, Transdermal, Q24H  sodium chloride, 10 mL, Intravenous, Q12H  thiamine, 100 mg, Oral, Daily      sodium chloride 0.9 % with KCl 20 mEq, 100 mL/hr, Last Rate: 100 mL/hr (05/09/22 2150)    Diet Regular       Assessment/Plan     Active Hospital Problems    Diagnosis  POA   • **Intractable back pain [M54.9]  Yes   • Hypokalemia [E87.6]  Yes   • Tobacco abuse [Z72.0]  Yes   • Alcohol withdrawal (HCC) [F10.239]  Yes   • Heart burn [R12]  Yes      Resolved Hospital Problems   No resolved problems to display.     Mr. Jeter is a 46 year old male who presented to the hospital with complaints of back pain. He has a history of daily alcohol use with 6-7  beers a day.    · Intractable back pain: MRI now returned with some DDD but no acute fracture/compression/disc herniation. L4-5 with shortened pedicles and mild central canal stenosis. Nothing surgical it seems. Orthopedic surgery was consulted. PT evaluation ordered. Will treat symptoms with pain control and muscle relaxants. Add Lidoderm. Could consider Medrol dose pack and/or gabapentin if no improvement.   · ETOH abuse/alcohol withdrawal: Last CIWA 6. Continued scheduled Ativan and PRN agents as needed. Vitamin replacement. Check TSH/folate/vitamin B12. Ask ACCESS to see. Trend CMP.  · Tobacco abuse: Nicotine patch in place.  · Hypokalemia: Resolved.   · Heartburn: On scheduled acid reducer.    Discussed with patient.    VTE Prophylaxis - SCDs  Code Status - Full code  Disposition - Anticipate discharge TBD. Home with possible HH versus outpatient PT pending his progress.      JESSE Fields  [facility]  05/10/22  13:25 EDT

## 2022-05-10 NOTE — CONSULTS
Orthopaedic Consultation      Patient: Bogdan Jeter    Date of Admission: 5/9/2022 10:54 AM    YOB: 1975    Medical Record Number: [id number]    Attending Physician:  Jorge Payton MD    Consulting Physician:  Garrick Cisneros PA-C        Chief Complaints: Intractable low back pain    History of Present Illness:     The patient is a pleasant 46-year-old gentleman.  He presented to [facility] with intractable low back pain.  He currently does not take any medications on a regular basis.  He smokes a pack a day.  He drinks about 6 beers a day.  He was in his usual state.  He works at a car [employer].  Patient states that he got through his workday.  When he got home.  He went to get out of his car.  He felt an immediate discomfort within his left low back.  Has gotten worse over the past few days.  Has not been able to ambulate.  He notes left-sided weakness.  He denies any trauma or falls.  He denies any urinary incontinence.  He denies any loss of control of his bowels.  He denies any head trauma.  X-rays in the emergency room were obtained.  This showed degenerative changes at the L5-S1 level.  There was no evidence of fracture.  He was given muscle relaxants as well as pain medications in the emergency room.  His pain was not controlled.  Therefore he has been admitted to the hospital for further evaluation and management.  Currently admitted to hospitalist team.  Orthopedics has been consulted.  MRI of the lumbar spine has been ordered and currently pending.        Allergies: No Known Allergies    Medications:   Home Medications:  No medications prior to admission.       Current Medications:  Scheduled Meds:cyclobenzaprine, 10 mg, Oral, Q8H  famotidine, 20 mg, Intravenous, Q12H  LORazepam, 1 mg, Oral, Q6H   Followed by  LORazepam, 1 mg, Oral, Q8H  nicotine, 1 patch, Transdermal, Q24H  sodium chloride, 10 mL, Intravenous, Q12H  thiamine, 100 mg, Oral, Daily      Continuous  Infusions:sodium chloride 0.9 % with KCl 20 mEq, 100 mL/hr, Last Rate: 100 mL/hr (05/09/22 2150)      PRN Meds:.•  acetaminophen **OR** acetaminophen **OR** acetaminophen  •  aluminum-magnesium hydroxide-simethicone  •  HYDROcodone-acetaminophen  •  HYDROmorphone **AND** naloxone  •  LORazepam **OR** LORazepam **OR** LORazepam **OR** LORazepam **OR** LORazepam **OR** LORazepam  •  ondansetron  •  [COMPLETED] Insert peripheral IV **AND** sodium chloride  •  sodium chloride    No past medical history on file.  No past surgical history on file.  Social History     Occupational History   • Occupation: [employer]   Tobacco Use   • Smoking status: Current Every Day Smoker     Packs/day: 1.00     Years: 15.00     Pack years: 15.00     Types: Cigarettes   • Smokeless tobacco: Not on file   Vaping Use   • Vaping Use: Never used   Substance and Sexual Activity   • Alcohol use: Yes     Alcohol/week: 5.0 standard drinks     Types: 5 Cans of beer per week     Comment: daily   • Drug use: Yes     Frequency: 1.0 times per week     Types: Marijuana   • Sexual activity: Defer      Social History     Social History Narrative   • Not on file     No family history on file.      Review of Systems:   No other pertinent positives or negatives other than what is mentioned in the HPI and below.  Constitutional: Negative for fatigue, fever, or weight loss  HEENT: No active headache.  Pulmonary: Patient denies SOA.  Cardiovascular: Patient denies any chest pain.  Gastrointestinal:  Patient denies active vomiting or diarrhea.  Musculoskeletal: Positive for low back pain.  Neurological: Patient denies active dizziness or loss of consciousness.  Skin: Patient denies any active bleeding.    Vital signs in last 24 hours:  Temp:  [97.3 °F (36.3 °C)-98.9 °F (37.2 °C)] 98.1 °F (36.7 °C)  Heart Rate:  [] 122  Resp:  [16-18] 16  BP: (116-170)/() 170/107  Vitals:    05/09/22 1700 05/09/22 1848 05/09/22 2254 05/10/22 0700   BP: (!)  "164/103  167/98 (!) 170/107   BP Location: Left arm  Left arm Left arm   Patient Position: Lying  Lying Lying   Pulse: 112  117 (!) 122   Resp: 18  16 16   Temp: 98.9 °F (37.2 °C)  98.6 °F (37 °C) 98.1 °F (36.7 °C)   TempSrc: Oral  Oral Oral   SpO2: 98%  94% 100%   Weight:  72.6 kg (160 lb)     Height:  180.3 cm (71\")            Physical Exam: 46 y.o. male         General Appearance:  Alert, cooperative, in no acute distress    HEENT:    Atraumatic, Pupils are equal   Neck:   Cervical spine midline, no appreciable JVD   Lungs:     Breathing non-labored and chest rise symmetric    Heart:   Abdomen:     Rectal:       Extremities:   Pulses  Neurovascular:   Skin:   Musculoskeletal:      Pulse regular    Soft, Non-tender or distended    Deferred        No clubbing, cyanosis, or edema    Intact    Cranial nerves 2 - 12 grossly intact, sensation intact    No skin lesions  Focused physical examination of the left lower extremity as well as the low back was performed.  He is slightly tender over the sacroiliac joint.  As well as over the paraspinal musculatures.  No appreciable skin lesions.  He is able to perform active hip flexion.  As well as active knee flexion.  He has pain with straight leg raise.  His compartments are soft.  He is neurovascular intact distally.  He has a little bit of decreased sensation of the L5-S1 nerve root.  No pain with logroll.  Compartments are soft.  Foot is warm and well-perfused.  Pulses intact.  No effusions.     Diagnostic Tests:    Results from last 7 days   Lab Units 05/10/22  0459   WBC 10*3/mm3 7.36   HEMOGLOBIN g/dL 13.9   HEMATOCRIT % 38.5   PLATELETS 10*3/mm3 215     Results from last 7 days   Lab Units 05/10/22  0459   SODIUM mmol/L 138   POTASSIUM mmol/L 3.8   CHLORIDE mmol/L 101   CO2 mmol/L 25.2   BUN mg/dL 7   CREATININE mg/dL 0.78   GLUCOSE mg/dL 83   CALCIUM mg/dL 8.2*        Study Result    Narrative & Impression   LUMBAR SPINE X-RAYS     HISTORY: Back pain following " injury.     TECHNIQUE: Five x-rays of the lumbosacral spine are provided. There is  no previous imaging for correlation.     FINDINGS: There is atheromatous vascular calcification along the  abdominal aorta. Lumbar vertebral body height, alignment, and disc  height are normal. No pars defect or bone lesion is present. The  visualized upper pelvic bones and joints appear normal and the bowel gas  pattern appears normal.     IMPRESSION:  Atheromatous vascular calcification. There is degenerative  facet joint change L5-S1 without subluxation. Otherwise negative lumbar  x-rays.     This report was finalized on 5/9/2022 11:38 AM by Dr. Kehinde Dudley M.D.              Assessment:  Patient Active Problem List   Diagnosis   • Intractable back pain   • Hypokalemia   • Tobacco abuse   • ETOH abuse   • Heart burn         Plan:      I did have an extensive discussion with the patient in regards to his situation in the hospital today.  Have reviewed the above.  We will await the MRI of the lumbar spine.  This is currently pending.  Further recommendations to follow throughout his hospital course.  Continue with pain control.  We will likely order physical therapy once the MRI of the lumbar spine has returned.  All questions answered.  Further recommendations to follow throughout his hospital course.  Thank you very much for allowing us to be a part of the patient's care and consultation.  All findings discussed with my supervising physicians.      Date: 5/10/2022  Garrick Cisneros PA-C

## 2022-05-10 NOTE — PAYOR COMM NOTE
"INITIAL CLINICAL FOR REVIEW  ID #[id number]  P:  [phone]  F:  [phone]          Bogdan Pearson (46 y.o. Male)             Date of Birth   1975    Social Security Number       Address   [address] [place] [address]    Home Phone   [phone]    MRN   [id number]       Jewish   None    Marital Status   Single                            Admission Date   22    Admission Type   Emergency    Admitting Provider   Cony Damian MD    Attending Provider   Jorge Payton MD    Department, Room/Bed   [address], P789/1       Discharge Date       Discharge Disposition       Discharge Destination                               Attending Provider: Jorge Payton MD    Allergies: No Known Allergies    Isolation: None   Infection: None   Code Status: CPR   Advance Care Planning Activity    Ht: 180.3 cm (71\")   Wt: 72.6 kg (160 lb)    Admission Cmt: None   Principal Problem: Intractable back pain [M54.9]                 Active Insurance as of 2022     Primary Coverage     Payor Plan Insurance Group Employer/Plan Group    [facility] BY VALERI [facility] BY VALERI [id number]     Payor Plan Address Payor Plan Phone Number Payor Plan Fax Number Effective Dates    [address]   2021 - None Entered    [place] [address]       Subscriber Name Subscriber Birth Date Member ID       BOGDAN PEARSON 1975 [id number]                 Emergency Contacts      (Rel.) Home Phone Work Phone Mobile Phone    KRYSTAL WEBSTER (Father) [phone] -- [phone]               History & Physical      Cony Damian MD at 22 2002          Internal medicine history and physical  INTERNAL MEDICINE   [facility]       Patient Identification:  Name: Bogdan Pearson  Age: 46 y.o.  Sex: male  :  1975  MRN: [id number]                   Primary Care Physician: Provider, No Known                               Date of " admission:5/9/2022    Chief Complaint: Progressive worsening pain and discomfort in the lower back with weakness in the left leg since Saturday afternoon started as he was getting out of the car.    History of Present Illness:   Patient is a 46-year-old male who works at the [email or web address] and has no prior history of back pain or any other medical issues and does not take any medications on regular basis and admits to have habit of smoking about a pack per day and drinks about 6 beers a day and last drink was last night was in his usual state of his health when he went to work on Saturday.  He carried out his Duties at the [employer] on Saturday and after His Work Was Finished He Got into the Car without Any Problem.  After He Arrived Home and As He Was Getting Out Of the Car He Felt Discomfort in His Left Lower Back Which Has Been Progressively Getting Worse to the Point That He Cannot Move or Ambulate.  It Has Reached the Point That He Had to Call Ambulance.  Patient Lives on the Third Floor Apartment and He Was Scooting down until EMS Personnel Met Him and Getting Him to the Ambulance and Brought Him to the Hospital.  Patient Denies Any Incontinence Denies Any Loss of Control of His Bowels.  He Claims That the Pain Radiates into His Left Thigh and Outer Thigh.  Work-Up in the Emergency Room Included X-Ray of His Lumbar Spine That Showed Degenerative Changes in L5 and S1 Region with No Acute Fracture.  Patient Was Given Muscle Relaxants and Pain Medications without Any Improvement.  Patient Is Being Admitted for the Management of Intractable Low Back Pain and Further Evaluation.        Past Medical History:  No past medical history on file.  Past Surgical History:  No past surgical history on file.   Home Meds:  No medications prior to admission.     Current Meds:     Current Facility-Administered Medications:   •  acetaminophen (TYLENOL) tablet 650 mg, 650 mg, Oral, Q4H PRN **OR** acetaminophen (TYLENOL) 160 MG/5ML  solution 650 mg, 650 mg, Oral, Q4H PRN **OR** acetaminophen (TYLENOL) suppository 650 mg, 650 mg, Rectal, Q4H PRN, Cony Damian MD  •  cyclobenzaprine (FLEXERIL) tablet 10 mg, 10 mg, Oral, Q8H, Cony Damian MD  •  HYDROcodone-acetaminophen (NORCO) 7.5-325 MG per tablet 1 tablet, 1 tablet, Oral, Q4H PRN, Cony Damian MD  •  HYDROmorphone (DILAUDID) injection 0.5 mg, 0.5 mg, Intravenous, Q2H PRN **AND** naloxone (NARCAN) injection 0.4 mg, 0.4 mg, Intravenous, Q5 Min PRN, Cony Damian MD  •  ondansetron (ZOFRAN) injection 4 mg, 4 mg, Intravenous, Q6H PRN, Cony Damian MD  •  [COMPLETED] Insert peripheral IV, , , Once **AND** sodium chloride 0.9 % flush 10 mL, 10 mL, Intravenous, PRN, Cony Damian MD  •  sodium chloride 0.9 % flush 10 mL, 10 mL, Intravenous, Q12H, Cony Damian MD  •  sodium chloride 0.9 % flush 10 mL, 10 mL, Intravenous, PRN, Cony Damian MD  •  sodium chloride 0.9 % with KCl 20 mEq/L infusion, 100 mL/hr, Intravenous, Continuous, Cony Damian MD  Allergies:  No Known Allergies  Social History:   Social History     Tobacco Use   • Smoking status: Current Every Day Smoker     Packs/day: 1.00     Years: 15.00     Pack years: 15.00     Types: Cigarettes   • Smokeless tobacco: Not on file   Substance Use Topics   • Alcohol use: Yes     Alcohol/week: 5.0 standard drinks     Types: 5 Cans of beer per week     Comment: daily      Family History:  No family history on file.       Review of Systems  See history of present illness and past medical history.    Constitutional: Remarkable for no fever or chills  Cardiovascular: Remarkable for no chest pain or shortness of breath  GI: Remarkable for no nausea vomiting or diarrhea complaining of some heartburn which she has had issues before.  : Remarkable for no burning urination frequency or urgency  Musculoskeletal: Remarkable for back pain with pain in his left thigh radiating into his [place] side-impact.  Neurological: Remarkable for no loss of  "consciousness or continence.  Remainder of ROS is negative.      Vitals:   BP (!) 164/103 (BP Location: Left arm, Patient Position: Lying)   Pulse 112   Temp 98.9 °F (37.2 °C) (Oral)   Resp 18   Ht 180.3 cm (71\")   Wt 72.6 kg (160 lb)   SpO2 98%   BMI 22.32 kg/m²   I/O: No intake or output data in the 24 hours ending 05/09/22 2002  Exam:  Patient is examined using the personal protective equipment as per guidelines from infection control for this particular patient as enacted.  Hand washing was performed before and after patient interaction.  General Appearance:    Alert, cooperative, no distress, appears stated age   Head:    Normocephalic, without obvious abnormality, atraumatic   Eyes:    PERRL, conjunctiva/corneas clear, EOM's intact, both eyes   Ears:    Normal external ear canals, both ears   Nose:   Nares normal, septum midline, mucosa normal, no drainage    or sinus tenderness   Throat:   Lips, tongue, gums normal; oral mucosa pink and moist   Neck:   Supple, symmetrical, trachea midline, no adenopathy;     thyroid:  no enlargement/tenderness/nodules; no carotid    bruit or JVD   Back:     Symmetric, no curvature, ROM normal, no CVA tenderness   Lungs:     Clear to auscultation bilaterally, respirations unlabored   Chest Wall:    No tenderness or deformity    Heart:    Regular rate and rhythm, S1 and S2 normal, no murmur, rub   or gallop   Abdomen:    Soft nontender   Extremities:   Extremities normal, atraumatic, no cyanosis or edema   Pulses:   Pulses palpable in all extremities; symmetric all extremities   Skin:   Skin color normal, Skin is warm and dry,  no rashes or palpable lesions   Neurologic:  Straight leg raising test on the left side was positive.  Weakness of her left thigh because of discomfort in his back.       Data Review:      I reviewed the patient's new clinical results.  Results from last 7 days   Lab Units 05/09/22  1414   WBC 10*3/mm3 8.08   HEMOGLOBIN g/dL 14.8   PLATELETS " "10*3/mm3 241     Results from last 7 days   Lab Units 05/09/22  1414   SODIUM mmol/L 138   POTASSIUM mmol/L 3.2*   CHLORIDE mmol/L 101   CO2 mmol/L 22.7   BUN mg/dL 3*   CREATININE mg/dL 0.62*   CALCIUM mg/dL 8.2*   GLUCOSE mg/dL 80     Microbiology Results (last 10 days)     ** No results found for the last 240 hours. **      XR Spine Lumbar Complete 4+VW    Result Date: 5/9/2022  Atheromatous vascular calcification. There is degenerative facet joint change L5-S1 without subluxation. Otherwise negative lumbar x-rays.  This report was finalized on 5/9/2022 11:38 AM by Dr. Kehinde Dudley M.D.          Assessment:  Active Hospital Problems    Diagnosis  POA   • **Intractable back pain [M54.9]  Yes   • Hypokalemia [E87.6]  Unknown   • Tobacco abuse [Z72.0]  Unknown   • ETOH abuse [F10.10]  Unknown   • Heart burn [R12]  Unknown       Plan: See admitting orders  · Manage his pain and provide him with muscle relaxants  · MRI of his lumbar spine  · Orthospine consultation  · Manages heartburn with as needed Mylanta and provide him with Pepcid.  · Thiamine and watch for alcohol withdrawal  · OT PT evaluation    Cony Damian MD   5/9/2022  20:02 EDT  Much of this encounter note is an electronic transcription/translation of spoken language to printed text. The electronic translation of spoken language may permit erroneous, or at times, nonsensical words or phrases to be inadvertently transcribed; Although I have reviewed the note for such errors, some may still exist      Electronically signed by Cony Damian MD at 05/10/22 0536          Emergency Department Notes      Lissette Castro RN at 05/09/22 0915        Pt arrives via EMS from home with c/o back pain. Pt reports he injured it Saturday getting out of his car. Pt reports it radiates down the left leg. Per EMS, pt \"crawled\" to stretcher.     Pt given mask in triage, staff in PPE.      Electronically signed by Lissette Castro RN at 05/09/22 0917     Angelika Subramanian PA at " 05/09/22 1200           EMERGENCY DEPARTMENT ENCOUNTER    Room Number:  A03/03  Date seen:  5/9/2022  Time seen: 12:00 EDT  PCP: Provider, No Known  Historian: patient      HPI:  Chief Complaint: back pain    Context: Bogdan Jeter is a 46 y.o. male who presents to the ED for evaluation of back pain.  He states 2 days ago he reached to 1 side to try to get out of his car when he got intense pain in his left low back.  He states the pain has gotten progressively worse over the last 2 days.  He states the pain is shooting down his left leg to his knee.  He denies any numbness in the left leg or buttock region.  He denies any loss of bowel or bladder control.  Patient states he lives on the third floor of an apartment building and had to crawl down steps today to get picked up by an ambulance.  He denies any history of back surgeries or chronic back pain.  He denies any recent trauma or falls.      PAST MEDICAL HISTORY  Active Ambulatory Problems     Diagnosis Date Noted   • No Active Ambulatory Problems     Resolved Ambulatory Problems     Diagnosis Date Noted   • No Resolved Ambulatory Problems     No Additional Past Medical History         PAST SURGICAL HISTORY  No past surgical history on file.      FAMILY HISTORY  No family history on file.      SOCIAL HISTORY  Social History     Socioeconomic History   • Marital status: Single         ALLERGIES  Patient has no known allergies.        REVIEW OF SYSTEMS  Review of Systems   Constitutional: Negative for chills and fever.   Respiratory: Negative for cough and shortness of breath.    Cardiovascular: Negative for chest pain.   Gastrointestinal: Negative for abdominal pain, nausea and vomiting.   Genitourinary: Negative for flank pain and frequency.   Musculoskeletal: Positive for back pain.   Skin: Negative for color change.   Neurological: Negative for weakness and numbness.      All systems reviewed and negative except for those discussed in HPI.       PHYSICAL  EXAM  ED Triage Vitals [05/09/22 0916]   Temp Heart Rate Resp BP SpO2   97.3 °F (36.3 °C) 111 16 116/78 96 %      Temp src Heart Rate Source Patient Position BP Location FiO2 (%)   -- -- -- -- --         GENERAL: Moderate distress due to pain.  HENT: atraumatic  EYES: no scleral icterus  CV: regular rhythm, regular rate  RESPIRATORY: normal effort  ABDOMEN: soft, nontender  MUSCULOSKELETAL: no deformity.  No midline C, T, or L-spine tenderness.  Tender in the left low back and buttock region.  Full sensation to light touch bilateral lower extremities.  Decreased extension of left leg due to pain.  Patient unable to bear weight on left leg and ambulate due to pain.  Positive straight leg raise  NEURO: alert, moves all extremities, follows commands  SKIN: warm, dry    Vital signs and nursing notes reviewed.          LAB RESULTS  Recent Results (from the past 24 hour(s))   Comprehensive Metabolic Panel    Collection Time: 05/09/22  2:14 PM    Specimen: Blood   Result Value Ref Range    Glucose 80 65 - 99 mg/dL    BUN 3 (L) 6 - 20 mg/dL    Creatinine 0.62 (L) 0.76 - 1.27 mg/dL    Sodium 138 136 - 145 mmol/L    Potassium 3.2 (L) 3.5 - 5.2 mmol/L    Chloride 101 98 - 107 mmol/L    CO2 22.7 22.0 - 29.0 mmol/L    Calcium 8.2 (L) 8.6 - 10.5 mg/dL    Total Protein 6.6 6.0 - 8.5 g/dL    Albumin 3.50 3.50 - 5.20 g/dL    ALT (SGPT) 47 (H) 1 - 41 U/L    AST (SGOT) 99 (H) 1 - 40 U/L    Alkaline Phosphatase 117 39 - 117 U/L    Total Bilirubin 0.7 0.0 - 1.2 mg/dL    Globulin 3.1 gm/dL    A/G Ratio 1.1 g/dL    BUN/Creatinine Ratio 4.8 (L) 7.0 - 25.0    Anion Gap 14.3 5.0 - 15.0 mmol/L    eGFR 118.6 >60.0 mL/min/1.73   CBC Auto Differential    Collection Time: 05/09/22  2:14 PM    Specimen: Blood   Result Value Ref Range    WBC 8.08 3.40 - 10.80 10*3/mm3    RBC 4.23 4.14 - 5.80 10*6/mm3    Hemoglobin 14.8 13.0 - 17.7 g/dL    Hematocrit 40.9 37.5 - 51.0 %    MCV 96.7 79.0 - 97.0 fL    MCH 35.0 (H) 26.6 - 33.0 pg    MCHC 36.2 (H) 31.5 -  35.7 g/dL    RDW 14.1 12.3 - 15.4 %    RDW-SD 49.1 37.0 - 54.0 fl    MPV 9.5 6.0 - 12.0 fL    Platelets 241 140 - 450 10*3/mm3    Neutrophil % 66.9 42.7 - 76.0 %    Lymphocyte % 22.6 19.6 - 45.3 %    Monocyte % 8.5 5.0 - 12.0 %    Eosinophil % 1.0 0.3 - 6.2 %    Basophil % 0.6 0.0 - 1.5 %    Neutrophils, Absolute 5.40 1.70 - 7.00 10*3/mm3    Lymphocytes, Absolute 1.83 0.70 - 3.10 10*3/mm3    Monocytes, Absolute 0.69 0.10 - 0.90 10*3/mm3    Eosinophils, Absolute 0.08 0.00 - 0.40 10*3/mm3    Basophils, Absolute 0.05 0.00 - 0.20 10*3/mm3       Ordered the above labs and independently reviewed the results.        RADIOLOGY  XR Spine Lumbar Complete 4+VW    Result Date: 5/9/2022  Narrative: LUMBAR SPINE X-RAYS  HISTORY: Back pain following injury.  TECHNIQUE: Five x-rays of the lumbosacral spine are provided. There is no previous imaging for correlation.  FINDINGS: There is atheromatous vascular calcification along the abdominal aorta. Lumbar vertebral body height, alignment, and disc height are normal. No pars defect or bone lesion is present. The visualized upper pelvic bones and joints appear normal and the bowel gas pattern appears normal.      Impression: Atheromatous vascular calcification. There is degenerative facet joint change L5-S1 without subluxation. Otherwise negative lumbar x-rays.  This report was finalized on 5/9/2022 11:38 AM by Dr. Kehinde Dudley M.D.        I ordered the above noted radiological studies. Reviewed by me and discussed with radiologist.  See dictation for official radiology interpretation.    MEDICATIONS GIVEN IN ER  Medications   sodium chloride 0.9 % flush 10 mL (has no administration in time range)   ketorolac (TORADOL) injection 30 mg (30 mg Intramuscular Given 5/9/22 1216)   cyclobenzaprine (FLEXERIL) tablet 10 mg (10 mg Oral Given 5/9/22 1215)       MEDICAL RECORD REVIEW  I reviewed the patient's records      PROGRESS, DATA ANALYSIS, CONSULTS, AND MEDICAL DECISION MAKING    All  labs have been independently reviewed by me.  All radiology studies have been reviewed by me. Discussion below represents my analysis of pertinent findings related to patient's condition, differential diagnosis, treatment plan and final disposition.    DDX includes but not limited to number radiculopathy, sciatica, disc herniation, compression fracture, lumbar strain, muscle spasm.      ED Course as of 05/09/22 1700   Mon May 09, 2022   1317 Recheck.  He has had some improvement in pain with medication.  Dr. Levy saw the patient with me at bedside.  We will get him up and try to ambulate to see if he is able to be discharged. [AH]   1326 Patient was unable to get out of bed with assistance but unable to bear weight on left leg or walk. [AH]   1345 Patient rechecked.  As he was unable to bear weight or ambulate he will not be able to go home.  Patient lives on the third floor of an apartment building and had to crawl down the steps today to get picked up by an ambulance. [AH]   1421 Discussed the patient with Dr. Damian who agrees to admit to a [facility] bed. []      ED Course User Index  [AH] Angelika Subramanian PA           Reviewed pt's history and workup with Dr. Levy.  After a bedside evaluation; they agree with the plan of care      Patient's disposition is admission.    Patient was placed in face mask in first look. Patient was wearing facemask each time I entered the room and throughout our encounter. I wore full protective equipment throughout this patient encounter including a face mask, eye shield, gown and gloves. Hand hygiene was performed before donning protective equipment and after removal when leaving the room.        DIAGNOSIS  Final diagnoses:   Intractable back pain   Immobility           Latest Documented Vital Signs:  As of 17:00 EDT  BP- 131/80 HR- 84 Temp- 97.3 °F (36.3 °C) O2 sat- 95%         Angelika Subramanian PA  05/09/22 1700      Electronically signed by Angelika Subramanian PA at 05/09/22  1700     Aldo Levy MD at 05/09/22 1317          MD ATTESTATION NOTE    The AISHWARYA and I have discussed this patient's history, physical exam, and treatment plan.  I have reviewed the documentation and personally had a face to face interaction with the patient. I affirm the documentation and agree with the treatment and plan.  The attached note describes my personal findings.      I provided a substantive portion of the care of the patient.  I personally performed the physical exam in its entirety, and below are my findings.  For this patient encounter, the patient wore surgical mask, I wore full protective PPE including N95 and eye protection.      Brief HPI: Patient complains of left lower back pain for the past 2 days.  Pain began as he was trying to get out of his car.  Pain radiates into the left leg.  Complains of tingling in his left leg but denies leg weakness, loss of bowel/bladder control, saddle anesthesia, fever, abdominal pain, or prior back surgery.  He is not on anticoagulants.  Pain is worse with movement.    PHYSICAL EXAM  ED Triage Vitals [05/09/22 0916]   Temp Heart Rate Resp BP SpO2   97.3 °F (36.3 °C) 111 16 116/78 96 %      Temp src Heart Rate Source Patient Position BP Location FiO2 (%)   -- -- -- -- --         GENERAL: Awake, alert, oriented x3.  No acute distress  HENT: nares patent  EYES: no scleral icterus  CV: regular rhythm, normal rate  RESPIRATORY: normal effort, clear to auscultation bilaterally  ABDOMEN: soft, nontender  MUSCULOSKELETAL: There is tenderness over the left lumbar paraspinal muscles and left SI joint.  T and L-spine are nontender.  NEURO: Normal strength and light touch sensation in both lower extremities.  No saddle anesthesia.  Straight leg raises positive on the left at 45 degrees.  PSYCH:  calm, cooperative  SKIN: warm, dry    Vital signs and nursing notes reviewed.        Plan: X-rays of lumbar spine showed mild degenerative changes.  Patient's symptoms are  consistent with sciatica.  Plan is for symptomatic treatment.    1340: Patient was able to stand with assistance but was unable to ambulate due to pain.  He will be admitted.       Aldo Levy MD  05/09/22 1411      Electronically signed by Aldo Levy MD at 05/09/22 1411     Nickolas Polanco, RN at 05/09/22 1326        Patient able to stand with assistance but is unable to bear weight on his left leg or walk    Electronically signed by Nickolas Polanco, RN at 05/09/22 1327     Nickolas Polanco, RN at 05/09/22 1449          Nursing report ED to floor  Bogdan Jeter  46 y.o.  male    HPI (triage note):   Chief Complaint   Patient presents with   • Back Pain       Admitting doctor:   Cony Damian MD    Admitting diagnosis:   The primary encounter diagnosis was Intractable back pain. A diagnosis of Immobility was also pertinent to this visit.    Code status:   Current Code Status     Date Active Code Status Order ID Comments User Context       Not on file    Advance Care Planning Activity          Allergies:   Patient has no known allergies.    Weight:   There were no vitals filed for this visit.    Most recent vitals:   Vitals:    05/09/22 0916   BP: 116/78   Pulse: 111   Resp: 16   Temp: 97.3 °F (36.3 °C)   SpO2: 96%       Active LDAs/IV Access:   Lines, Drains & Airways     Active LDAs     Name Placement date Placement time Site Days    Peripheral IV 05/09/22 1413 Left Antecubital 05/09/22  1413  Antecubital  less than 1                Labs (abnormal labs have a star):   Labs Reviewed   COMPREHENSIVE METABOLIC PANEL - Abnormal; Notable for the following components:       Result Value    BUN 3 (*)     Creatinine 0.62 (*)     Potassium 3.2 (*)     Calcium 8.2 (*)     ALT (SGPT) 47 (*)     AST (SGOT) 99 (*)     BUN/Creatinine Ratio 4.8 (*)     All other components within normal limits    Narrative:     GFR Normal >60  Chronic Kidney Disease <60  Kidney Failure <15     CBC WITH AUTO DIFFERENTIAL - Abnormal;  Notable for the following components:    MCH 35.0 (*)     MCHC 36.2 (*)     All other components within normal limits   CBC AND DIFFERENTIAL    Narrative:     The following orders were created for panel order CBC & Differential.  Procedure                               Abnormality         Status                     ---------                               -----------         ------                     CBC Auto Differential[[id number]]        Abnormal            Final result                 Please view results for these tests on the individual orders.       EKG:   No orders to display       Meds given in ED:   Medications   sodium chloride 0.9 % flush 10 mL (has no administration in time range)   ketorolac (TORADOL) injection 30 mg (30 mg Intramuscular Given 5/9/22 1216)   cyclobenzaprine (FLEXERIL) tablet 10 mg (10 mg Oral Given 5/9/22 1215)       Imaging results:  XR Spine Lumbar Complete 4+VW    Result Date: 5/9/2022  Atheromatous vascular calcification. There is degenerative facet joint change L5-S1 without subluxation. Otherwise negative lumbar x-rays.  This report was finalized on 5/9/2022 11:38 AM by Dr. Kehinde Dudley M.D.        Ambulatory status:   - assist x2    Social issues:   Social History     Socioeconomic History   • Marital status: Single          Electronically signed by Nickolas Polanco RN at 05/09/22 1449             Vital Signs (last day)     Date/Time Temp Temp src Pulse Resp BP Patient Position SpO2    05/10/22 0700 98.1 (36.7) Oral 122 16 170/107 Lying 100    05/09/22 2254 98.6 (37) Oral 117 16 167/98 Lying 94    05/09/22 1700 98.9 (37.2) Oral 112 18 164/103 Lying 98    05/09/22 15:34:28 -- -- 84 17 131/80 Lying 95    05/09/22 0916 97.3 (36.3) -- 111 16 116/78 -- 96          Oxygen Therapy (last day)     Date/Time SpO2 Device (Oxygen Therapy) Flow (L/min) Oxygen Concentration (%) ETCO2 (mmHg)    05/10/22 0811 -- room air -- -- --    05/10/22 0700 100 room air -- -- --    05/09/22 2254 94 room  air -- -- --    05/09/22 2000 -- room air -- -- --    05/09/22 1700 98 room air -- -- --    05/09/22 15:34:28 95 room air -- -- --    05/09/22 0916 96 room air -- -- --          Lines, Drains & Airways     Active LDAs     Name Placement date Placement time Site Days    Peripheral IV 05/09/22 1413 Left Antecubital 05/09/22  1413  Antecubital  less than 1                CIWA (since admission)     Date/Time CIWA-Ar Score    05/10/22 1200 6    05/10/22 0811 3    05/10/22 0400 2    05/10/22 0000 2    05/09/22 2000 5    05/09/22 1813 3          Medication Administration Report for Bogdan Jeter as of 05/10/22 1304   Legend:    Given Hold Not Given Due Canceled Entry Other Actions    Time Time (Time) Time  Time-Action       Discontinued     Completed     Future     MAR Hold     Linked           Medications 05/09/22 05/10/22    cyclobenzaprine (FLEXERIL) tablet 10 mg  Dose: 10 mg  Freq: Every 8 Hours Scheduled Route: PO  Start: 05/09/22 2200    2150-Given            0606-Given     1400     2200             famotidine (PEPCID) injection 20 mg  Dose: 20 mg  Freq: Every 12 Hours Scheduled Route: IV  Start: 05/09/22 2100   Admin Instructions:   Dilute to 10 mL total volume and give IV push over 2 minutes.    2150-Given            0812-Given     2100              LORazepam (ATIVAN) tablet 1 mg  Dose: 1 mg  Freq: Every 6 Hours Route: PO  Start: 05/09/22 2100   End: 05/10/22 2059   Admin Instructions:    Caution: Look alike/sound alike drug alert    2014-Given            0330-Given     0811-Given     1500            Followed by  LORazepam (ATIVAN) tablet 1 mg  Dose: 1 mg  Freq: Every 8 Hours Route: PO  Start: 05/10/22 2100   End: 05/11/22 2059   Admin Instructions:    Caution: Look alike/sound alike drug alert     2100               nicotine (NICODERM CQ) 21 MG/24HR patch 1 patch  Dose: 1 patch  Freq: Every 24 Hours Scheduled Route: TD  Start: 05/10/22 0900   Admin Instructions:   Apply to clean, dry, nonhairy area of skin  (typically upper arm or shoulder)   Acutely Hazardous. Waste BOTH Residual Medication and/or Empty Package.     0812-Medication Applied               sodium chloride 0.9 % flush 10 mL  Dose: 10 mL  Freq: Every 12 Hours Scheduled Route: IV  Start: 05/09/22 2100 2150-Given            0812-Given     2100              thiamine (VITAMIN B-1) tablet 100 mg  Dose: 100 mg  Freq: Daily Route: PO  Start: 05/10/22 0900     0812-Given              Completed Medications  Medications 05/09/22 05/10/22       cyclobenzaprine (FLEXERIL) tablet 10 mg  Dose: 10 mg  Freq: Once Route: PO  Start: 05/09/22 1202   End: 05/09/22 1215    1215-Given                gadobenate dimeglumine (MULTIHANCE) injection 15 mL  Dose: 15 mL  Freq: Once in Imaging Route: IV  Start: 05/10/22 1215   End: 05/10/22 1133   Admin Instructions:   Vesicant; admin as rapid bolus; flush with 5 mL NS after admin or 20 mL for renal or aortoiliofemoral vasculature     1133-Given [C]               ketorolac (TORADOL) injection 30 mg  Dose: 30 mg  Freq: Once Route: IM  Start: 05/09/22 1202   End: 05/09/22 1216   Admin Instructions:       If given for pain, use the following pain scale:  Mild Pain = Pain Score of 1-3, CPOT 1-2  Moderate Pain = Pain Score of 4-6, CPOT 3-4  Severe Pain = Pain Score of 7-10, CPOT 5-8    1216-Given                     ,   Medication Administration Report for Bogdan Jeter as of 05/10/22 1304   Legend:    Given Hold Not Given Due Canceled Entry Other Actions    Time Time (Time) Time  Time-Action       Discontinued     Completed     Future     MAR Hold     Linked           Medications 05/09/22 05/10/22    sodium chloride 0.9 % with KCl 20 mEq/L infusion  Rate: 100 mL/hr Dose: 100 mL/hr  Freq: Continuous Route: IV  Start: 05/09/22 2015 2150-New Bag                      and   Medication Administration Report for Bogdan Jeter as of 05/10/22 1304   Legend:    Given Hold Not Given Due Canceled Entry Other Actions    Time Time (Time) Time   Time-Action       Discontinued     Completed     Future     MAR Hold     Linked           Medications 05/09/22 05/10/22    acetaminophen (TYLENOL) tablet 650 mg  Dose: 650 mg  Freq: Every 4 Hours PRN Route: PO  PRN Reason: Mild Pain   Start: 05/09/22 1923   Admin Instructions:   Do not exceed 4 grams of acetaminophen in a 24 hr period. Max dose of 2gm for AST/ALT greater than 120 units/L    If given for fever, use fever parameter: fever greater than 100.4 °F.    If given for pain, use the following pain scale:   Mild Pain = Pain Score of 1-3, CPOT 1-2  Moderate Pain = Pain Score of 4-6, CPOT 3-4  Severe Pain = Pain Score of 7-10, CPOT 5-8        Or  acetaminophen (TYLENOL) 160 MG/5ML solution 650 mg  Dose: 650 mg  Freq: Every 4 Hours PRN Route: PO  PRN Reason: Mild Pain   Start: 05/09/22 1923   Admin Instructions:   Do not exceed 4 grams of acetaminophen in a 24 hr period. Max dose of 2gm for AST/ALT greater than 120 units/L    If given for fever, use fever parameter: fever greater than 100.4 °F.    If given for pain, use the following pain scale:   Mild Pain = Pain Score of 1-3, CPOT 1-2  Moderate Pain = Pain Score of 4-6, CPOT 3-4  Severe Pain = Pain Score of 7-10, CPOT 5-8        Or  acetaminophen (TYLENOL) suppository 650 mg  Dose: 650 mg  Freq: Every 4 Hours PRN Route: RE  PRN Reason: Mild Pain   Start: 05/09/22 1923   Admin Instructions:   Do not exceed 4 grams of acetaminophen in a 24 hr period. Max dose of 2gm for AST/ALT greater than 120 units/L    If given for fever, use fever parameter: fever greater than 100.4 °F.    If given for pain, use the following pain scale:   Mild Pain = Pain Score of 1-3, CPOT 1-2  Moderate Pain = Pain Score of 4-6, CPOT 3-4  Severe Pain = Pain Score of 7-10, CPOT 5-8         aluminum-magnesium hydroxide-simethicone (MAALOX MAX) 400-400-40 MG/5ML suspension 15 mL  Dose: 15 mL  Freq: Every 4 Hours PRN Route: PO  PRN Reasons: Indigestion,Heartburn  Start: 05/09/22 2003   Admin  Instructions:   Maximum 60 mL in 24 hours.    2014-Given                HYDROcodone-acetaminophen (NORCO) 7.5-325 MG per tablet 1 tablet  Dose: 1 tablet  Freq: Every 4 Hours PRN Route: PO  PRN Reason: Moderate Pain   Start: 05/09/22 1923   End: 05/16/22 1922   Admin Instructions:   [JJ]    Do not exceed 4 grams of acetaminophen in a 24 hr period. Max dose of 2gm for AST/ALT greater than 120 units/L        If given for pain, use the following pain scale:   Mild Pain = Pain Score of 1-3, CPOT 1-2  Moderate Pain = Pain Score of 4-6, CPOT 3-4  Severe Pain = Pain Score of 7-10, CPOT 5-8    2150-Given            0330-Given     0811-Given     1217-Given             HYDROmorphone (DILAUDID) injection 0.5 mg  Dose: 0.5 mg  Freq: Every 2 Hours PRN Route: IV  PRN Reason: Severe Pain   Start: 05/09/22 1923   End: 05/16/22 1922   Admin Instructions:   If given for pain, use the following pain scale:  Mild Pain = Pain Score of 1-3, CPOT 1-2  Moderate Pain = Pain Score of 4-6, CPOT 3-4  Severe Pain = Pain Score of 7-10, CPOT 5-8    2014-Given            1054-Given              And  naloxone (NARCAN) injection 0.4 mg  Dose: 0.4 mg  Freq: Every 5 Minutes PRN Route: IV  PRN Reason: Respiratory Depression  Start: 05/09/22 1923   Admin Instructions:   If Respiratory Rate Less Than 8 or Patient is Difficult to Arouse, Stop ALL Narcotics & Contact Provider.  Administer Slow IV Push.  Repeat As Ordered Until Respiratory Rate is Greater Than 12.         LORazepam (ATIVAN) tablet 1 mg  Dose: 1 mg  Freq: Every 2 Hours PRN Route: PO  PRN Reason: Withdrawal  PRN Comment: For CIWA-Ar 8-10  Start: 05/09/22 2004   End: 05/16/22 2003   Admin Instructions:   Reassess 2 Hours After Administration   Caution: Look alike/sound alike drug alert        Or  LORazepam (ATIVAN) injection 1 mg  Dose: 1 mg  Freq: Every 2 Hours PRN Route: IV  PRN Reason: Withdrawal  PRN Comment: For CIWA-Ar 8-10  Start: 05/09/22 2004   End: 05/16/22 2003   Admin Instructions:    Reassess 2 Hours After Administration   Caution: Look alike/sound alike drug alert. Dilute 1:1 with normal saline.        Or  LORazepam (ATIVAN) tablet 2 mg  Dose: 2 mg  Freq: Every 1 Hour PRN Route: PO  PRN Reason: Withdrawal  PRN Comment: For CIWA-Ar 11-15  Start: 05/09/22 2004   End: 05/16/22 2003   Admin Instructions:   Reassess 1 Hour After Administration   Caution: Look alike/sound alike drug alert        Or  LORazepam (ATIVAN) injection 2 mg  Dose: 2 mg  Freq: Every 1 Hour PRN Route: IV  PRN Reason: Withdrawal  PRN Comment: For CIWA-Ar 11-15  Start: 05/09/22 2004   End: 05/16/22 2003   Admin Instructions:   Reassess 1 Hour After Administration   Caution: Look alike/sound alike drug alert. Dilute 1:1 with normal saline.        Or  LORazepam (ATIVAN) injection 2 mg  Dose: 2 mg  Freq: Every 15 Minutes PRN Route: IV  PRN Reason: Anxiety  PRN Comment: For CIWA-Ar Greater Than 15.  Repeat Dose in 15 Minutes if CIWA-Ar Does Not Decrease  Start: 05/09/22 2004   End: 05/16/22 2003   Admin Instructions:   Reassess 15 Minutes After Each Administration.  If CIWA-Ar Does Not Decrease Contact Provider To Discuss Transfer to Higher Level of Care.   Caution: Look alike/sound alike drug alert. Dilute 1:1 with normal saline.        Or  LORazepam (ATIVAN) injection 2 mg  Dose: 2 mg  Freq: Every 15 Minutes PRN Route: IM  PRN Reason: Withdrawal  PRN Comment: If Unable to Administer IV - For CIWA-Ar Greater Than 15.  Repeat Dose in 15 Minutes if CIWA-Ar Does Not Decrease  Start: 05/09/22 2004   End: 05/16/22 2003   Admin Instructions:   Reassess 15 Minutes After Each Administration.  If CIWA-Ar Does Not Decrease Contact Provider To Discuss Transfer to Higher Level of Care.  Caution: Look alike/sound alike drug alert. Dilute 1:1 with normal saline.         ondansetron (ZOFRAN) injection 4 mg  Dose: 4 mg  Freq: Every 6 Hours PRN Route: IV  PRN Reasons: Nausea,Vomiting  Start: 05/09/22 1923   Admin Instructions:   If BOTH  "ondansetron (ZOFRAN) and promethazine (PHENERGAN) are ordered use ondansetron first and THEN promethazine IF ondansetron is ineffective.         sodium chloride 0.9 % flush 10 mL  Dose: 10 mL  Freq: As Needed Route: IV  PRN Reason: Line Care  Start: 05/09/22 1922         sodium chloride 0.9 % flush 10 mL  Dose: 10 mL  Freq: As Needed Route: IV  PRN Reason: Line Care  Start: 05/09/22 1406                  Orders (active)      Start     Ordered    05/10/22 2100  LORazepam (ATIVAN) tablet 1 mg  Every 8 Hours        \"Followed by\" Linked Group Details    05/09/22 2005    05/10/22 1226  Diet Regular  Diet Effective Now         05/10/22 1225    05/10/22 0900  nicotine (NICODERM CQ) 21 MG/24HR patch 1 patch  Every 24 Hours Scheduled         05/09/22 2005    05/10/22 0900  thiamine (VITAMIN B-1) tablet 100 mg  Daily         05/09/22 2005    05/10/22 0537  PT Consult: Eval & Treat Functional Mobility Below Baseline  Once         05/10/22 0537    05/09/22 2200  cyclobenzaprine (FLEXERIL) tablet 10 mg  Every 8 Hours Scheduled         05/09/22 1924 05/09/22 2100  sodium chloride 0.9 % flush 10 mL  Every 12 Hours Scheduled         05/09/22 1924 05/09/22 2100  LORazepam (ATIVAN) tablet 1 mg  Every 6 Hours        \"Followed by\" Linked Group Details    05/09/22 2005 05/09/22 2100  famotidine (PEPCID) injection 20 mg  Every 12 Hours Scheduled         05/09/22 2005 05/09/22 2015  sodium chloride 0.9 % with KCl 20 mEq/L infusion  Continuous         05/09/22 1924 05/09/22 2005  Initiate Alcohol Withdrawal Protocol  Until Discontinued         05/09/22 2005 05/09/22 2005  Vital Signs  Per Hospital Policy         05/09/22 2005 05/09/22 2005  Continuous Pulse Oximetry  Continuous         05/09/22 2005 05/09/22 2005  Clinical [place] Withdrawal Assessment (CIWA-Ar)  Per Hospital Policy         05/09/22 2005 05/09/22 2005  If CIWA Score Less Than 8 Monitor Every 4 Hours & Then Discontinue Assessment - Restart " "Scoring Assessment & Protocol If Symptoms Reappear  Continuous         05/09/22 2005 05/09/22 2005  Obtain Pre & Post Sedation Scores With Every Lorazepam Dose - Hold For POSS Greater Than 2 or RASS of -3 or Less  Continuous         05/09/22 2005 05/09/22 2005  Seizure Precautions  Continuous         05/09/22 2005 05/09/22 2005  Notify Provider - Withdrawal  Until Discontinued         05/09/22 2005 05/09/22 2005  Notify Provider of Abnormal Lab Results  Until Discontinued         05/09/22 2005 05/09/22 2005  Notify Provider - Vitals  Until Discontinued         05/09/22 2005 05/09/22 2005  Safety Precautions  Continuous         05/09/22 2005 05/09/22 2004  LORazepam (ATIVAN) tablet 1 mg  Every 2 Hours PRN        \"Or\" Linked Group Details    05/09/22 2005 05/09/22 2004  LORazepam (ATIVAN) injection 1 mg  Every 2 Hours PRN        \"Or\" Linked Group Details    05/09/22 2005 05/09/22 2004  LORazepam (ATIVAN) tablet 2 mg  Every 1 Hour PRN        \"Or\" Linked Group Details    05/09/22 2005 05/09/22 2004  LORazepam (ATIVAN) injection 2 mg  Every 1 Hour PRN        \"Or\" Linked Group Details    05/09/22 2005 05/09/22 2004  LORazepam (ATIVAN) injection 2 mg  Every 15 Minutes PRN        \"Or\" Linked Group Details    05/09/22 2005 05/09/22 2004  LORazepam (ATIVAN) injection 2 mg  Every 15 Minutes PRN        \"Or\" Linked Group Details    05/09/22 2005 05/09/22 2003  aluminum-magnesium hydroxide-simethicone (MAALOX MAX) 400-400-40 MG/5ML suspension 15 mL  Every 4 Hours PRN         05/09/22 2005 05/09/22 2000  Vital Signs  Every 4 Hours       05/09/22 1924 05/09/22 1923  ondansetron (ZOFRAN) injection 4 mg  Every 6 Hours PRN         05/09/22 1924 05/09/22 1923  HYDROmorphone (DILAUDID) injection 0.5 mg  Every 2 Hours PRN        \"And\" Linked Group Details    05/09/22 1924 05/09/22 1923  naloxone (NARCAN) injection 0.4 mg  Every 5 Minutes PRN        \"And\" Linked Group Details    05/09/22 " "1924    05/09/22 1923  HYDROcodone-acetaminophen (NORCO) 7.5-325 MG per tablet 1 tablet  Every 4 Hours PRN         05/09/22 1924    05/09/22 1923  acetaminophen (TYLENOL) tablet 650 mg  Every 4 Hours PRN        \"Or\" Linked Group Details    05/09/22 1924    05/09/22 1923  acetaminophen (TYLENOL) 160 MG/5ML solution 650 mg  Every 4 Hours PRN        \"Or\" Linked Group Details    05/09/22 1924    05/09/22 1923  acetaminophen (TYLENOL) suppository 650 mg  Every 4 Hours PRN        \"Or\" Linked Group Details    05/09/22 1924 05/09/22 1923  Intake & Output  Every Shift       05/09/22 1924 05/09/22 1923  Weigh Patient  Once         05/09/22 1924 05/09/22 1923  Oxygen Therapy- Nasal Cannula; Titrate for SPO2: 90% - 95%  Continuous         05/09/22 1924 05/09/22 1923  Insert Peripheral IV  Once         05/09/22 1924 05/09/22 1923  Saline Lock & Maintain IV Access  Continuous         05/09/22 1924 05/09/22 1923  Code Status and Medical Interventions:  Continuous         05/09/22 1924 05/09/22 1923  Maintain Sequential Compression Device  Continuous         05/09/22 1924 05/09/22 1922  sodium chloride 0.9 % flush 10 mL  As Needed         05/09/22 1924 05/09/22 1815  Inpatient Case Management  Consult  Once        Provider:  (Not yet assigned)    05/09/22 1814 05/09/22 1407  Insert peripheral IV  Once        \"And\" Linked Group Details    05/09/22 1406    05/09/22 1406  sodium chloride 0.9 % flush 10 mL  As Needed        \"And\" Linked Group Details    05/09/22 1406    05/09/22 1351  [facility] (on-call MD unless specified) Details  Once        Specialty:  Hospitalist  Provider:  Cony Damian MD    05/09/22 1350                     Consult Notes       Garrick Cisneros PA-C at 05/10/22 0754      Consult Orders    1. Inpatient Orthopedic Surgery Consult [[id number]] ordered by Cony Damian MD at 05/09/22 1924                    Orthopaedic Consultation      Patient: Bogdan Jeter    Date of Admission: " 5/9/2022 10:54 AM    YOB: 1975    Medical Record Number: [id number]    Attending Physician:  Jorge Payton MD    Consulting Physician:  Garrick Cisneros PA-C        Chief Complaints: Intractable low back pain    History of Present Illness:     The patient is a pleasant 46-year-old gentleman.  He presented to [facility] with intractable low back pain.  He currently does not take any medications on a regular basis.  He smokes a pack a day.  He drinks about 6 beers a day.  He was in his usual state.  He works at a car [employer].  Patient states that he got through his workday.  When he got home.  He went to get out of his car.  He felt an immediate discomfort within his left low back.  Has gotten worse over the past few days.  Has not been able to ambulate.  He notes left-sided weakness.  He denies any trauma or falls.  He denies any urinary incontinence.  He denies any loss of control of his bowels.  He denies any head trauma.  X-rays in the emergency room were obtained.  This showed degenerative changes at the L5-S1 level.  There was no evidence of fracture.  He was given muscle relaxants as well as pain medications in the emergency room.  His pain was not controlled.  Therefore he has been admitted to the hospital for further evaluation and management.  Currently admitted to hospitalist team.  Orthopedics has been consulted.  MRI of the lumbar spine has been ordered and currently pending.        Allergies: No Known Allergies    Medications:   Home Medications:  No medications prior to admission.       Current Medications:  Scheduled Meds:cyclobenzaprine, 10 mg, Oral, Q8H  famotidine, 20 mg, Intravenous, Q12H  LORazepam, 1 mg, Oral, Q6H   Followed by  LORazepam, 1 mg, Oral, Q8H  nicotine, 1 patch, Transdermal, Q24H  sodium chloride, 10 mL, Intravenous, Q12H  thiamine, 100 mg, Oral, Daily      Continuous Infusions:sodium chloride 0.9 % with KCl 20 mEq, 100 mL/hr, Last Rate: 100 mL/hr (05/09/22  8460)      PRN Meds:.•  acetaminophen **OR** acetaminophen **OR** acetaminophen  •  aluminum-magnesium hydroxide-simethicone  •  HYDROcodone-acetaminophen  •  HYDROmorphone **AND** naloxone  •  LORazepam **OR** LORazepam **OR** LORazepam **OR** LORazepam **OR** LORazepam **OR** LORazepam  •  ondansetron  •  [COMPLETED] Insert peripheral IV **AND** sodium chloride  •  sodium chloride    No past medical history on file.  No past surgical history on file.  Social History     Occupational History   • Occupation: [employer]   Tobacco Use   • Smoking status: Current Every Day Smoker     Packs/day: 1.00     Years: 15.00     Pack years: 15.00     Types: Cigarettes   • Smokeless tobacco: Not on file   Vaping Use   • Vaping Use: Never used   Substance and Sexual Activity   • Alcohol use: Yes     Alcohol/week: 5.0 standard drinks     Types: 5 Cans of beer per week     Comment: daily   • Drug use: Yes     Frequency: 1.0 times per week     Types: Marijuana   • Sexual activity: Defer      Social History     Social History Narrative   • Not on file     No family history on file.      Review of Systems:   No other pertinent positives or negatives other than what is mentioned in the HPI and below.  Constitutional: Negative for fatigue, fever, or weight loss  HEENT: No active headache.  Pulmonary: Patient denies SOA.  Cardiovascular: Patient denies any chest pain.  Gastrointestinal:  Patient denies active vomiting or diarrhea.  Musculoskeletal: Positive for low back pain.  Neurological: Patient denies active dizziness or loss of consciousness.  Skin: Patient denies any active bleeding.    Vital signs in last 24 hours:  Temp:  [97.3 °F (36.3 °C)-98.9 °F (37.2 °C)] 98.1 °F (36.7 °C)  Heart Rate:  [] 122  Resp:  [16-18] 16  BP: (116-170)/() 170/107  Vitals:    05/09/22 1700 05/09/22 1848 05/09/22 2254 05/10/22 0700   BP: (!) 164/103  167/98 (!) 170/107   BP Location: Left arm  Left arm Left arm   Patient Position:  "Lying  Lying Lying   Pulse: 112  117 (!) 122   Resp: 18  16 16   Temp: 98.9 °F (37.2 °C)  98.6 °F (37 °C) 98.1 °F (36.7 °C)   TempSrc: Oral  Oral Oral   SpO2: 98%  94% 100%   Weight:  72.6 kg (160 lb)     Height:  180.3 cm (71\")            Physical Exam: 46 y.o. male         General Appearance:  Alert, cooperative, in no acute distress    HEENT:    Atraumatic, Pupils are equal   Neck:   Cervical spine midline, no appreciable JVD   Lungs:     Breathing non-labored and chest rise symmetric    Heart:   Abdomen:     Rectal:       Extremities:   Pulses  Neurovascular:   Skin:   Musculoskeletal:      Pulse regular    Soft, Non-tender or distended    Deferred        No clubbing, cyanosis, or edema    Intact    Cranial nerves 2 - 12 grossly intact, sensation intact    No skin lesions  Focused physical examination of the left lower extremity as well as the low back was performed.  He is slightly tender over the sacroiliac joint.  As well as over the paraspinal musculatures.  No appreciable skin lesions.  He is able to perform active hip flexion.  As well as active knee flexion.  He has pain with straight leg raise.  His compartments are soft.  He is neurovascular intact distally.  He has a little bit of decreased sensation of the L5-S1 nerve root.  No pain with logroll.  Compartments are soft.  Foot is warm and well-perfused.  Pulses intact.  No effusions.     Diagnostic Tests:    Results from last 7 days   Lab Units 05/10/22  0459   WBC 10*3/mm3 7.36   HEMOGLOBIN g/dL 13.9   HEMATOCRIT % 38.5   PLATELETS 10*3/mm3 215     Results from last 7 days   Lab Units 05/10/22  0459   SODIUM mmol/L 138   POTASSIUM mmol/L 3.8   CHLORIDE mmol/L 101   CO2 mmol/L 25.2   BUN mg/dL 7   CREATININE mg/dL 0.78   GLUCOSE mg/dL 83   CALCIUM mg/dL 8.2*        Study Result    Narrative & Impression   LUMBAR SPINE X-RAYS     HISTORY: Back pain following injury.     TECHNIQUE: Five x-rays of the lumbosacral spine are provided. There is  no previous " imaging for correlation.     FINDINGS: There is atheromatous vascular calcification along the  abdominal aorta. Lumbar vertebral body height, alignment, and disc  height are normal. No pars defect or bone lesion is present. The  visualized upper pelvic bones and joints appear normal and the bowel gas  pattern appears normal.     IMPRESSION:  Atheromatous vascular calcification. There is degenerative  facet joint change L5-S1 without subluxation. Otherwise negative lumbar  x-rays.     This report was finalized on 5/9/2022 11:38 AM by Dr. Kehinde Dudley M.D.              Assessment:  Patient Active Problem List   Diagnosis   • Intractable back pain   • Hypokalemia   • Tobacco abuse   • ETOH abuse   • Heart burn         Plan:      I did have an extensive discussion with the patient in regards to his situation in the hospital today.  Have reviewed the above.  We will await the MRI of the lumbar spine.  This is currently pending.  Further recommendations to follow throughout his hospital course.  Continue with pain control.  We will likely order physical therapy once the MRI of the lumbar spine has returned.  All questions answered.  Further recommendations to follow throughout his hospital course.  Thank you very much for allowing us to be a part of the patient's care and consultation.  All findings discussed with my supervising physicians.      Date: 5/10/2022  Garrick Cisneros PA-C        Electronically signed by Garrick Cisneros PA-C at 05/10/22 3454           Rocio Gastelum, RN    Registered Nurse   Nursing   Plan of Care       Addendum   Date of Service:  05/10/22 0456   Creation Time:  05/10/22 0456                  Goal Outcome Evaluation:  Plan of Care Reviewed With: patient  Progress: improving  Outcome Evaluation: Patient came in with c/o Intractable back pain, unable to ambulate for x2 days, patient denied of numbness and tingling at this time.  Patient alert and oriented x4, able to make all needs known to staff, skin  w/d to touch, respiration E/U, patient maintaining saturation above 90% on RA, VSS remind stable. Afebrile. continues to administered prn pain med as ordered, contiues to monitor CIWA as ordered. patient on christian ativan q6hr. patient is able to void well per urinal. stay NPO with sip of water with med.  cotinues on IV fluids with 20k, call bell with in easy reach.      Nurse  call and notify nurse of a call she receive from Dr Moreland, that he will not be able to see patient today, so Dr Miner want RN to call Dr Rickie MANRIQUE to consult neurosurgeon on patient. Night shift RN pass message on to Day shift RN.

## 2022-05-11 LAB
ALBUMIN SERPL-MCNC: 3.3 G/DL (ref 3.5–5.2)
ALBUMIN/GLOB SERPL: 1.2 G/DL
ALP SERPL-CCNC: 119 U/L (ref 39–117)
ALT SERPL W P-5'-P-CCNC: 33 U/L (ref 1–41)
ANION GAP SERPL CALCULATED.3IONS-SCNC: 14 MMOL/L (ref 5–15)
AST SERPL-CCNC: 50 U/L (ref 1–40)
BILIRUB SERPL-MCNC: 0.9 MG/DL (ref 0–1.2)
BUN SERPL-MCNC: 6 MG/DL (ref 6–20)
BUN/CREAT SERPL: 10.7 (ref 7–25)
CALCIUM SPEC-SCNC: 8.6 MG/DL (ref 8.6–10.5)
CHLORIDE SERPL-SCNC: 97 MMOL/L (ref 98–107)
CO2 SERPL-SCNC: 23 MMOL/L (ref 22–29)
CREAT SERPL-MCNC: 0.56 MG/DL (ref 0.76–1.27)
DEPRECATED RDW RBC AUTO: 44.3 FL (ref 37–54)
EGFRCR SERPLBLD CKD-EPI 2021: 122.3 ML/MIN/1.73
ERYTHROCYTE [DISTWIDTH] IN BLOOD BY AUTOMATED COUNT: 13.1 % (ref 12.3–15.4)
FOLATE SERPL-MCNC: 3.13 NG/ML (ref 4.78–24.2)
GLOBULIN UR ELPH-MCNC: 2.8 GM/DL
GLUCOSE BLDC GLUCOMTR-MCNC: 67 MG/DL (ref 70–130)
GLUCOSE BLDC GLUCOMTR-MCNC: 71 MG/DL (ref 70–130)
GLUCOSE BLDC GLUCOMTR-MCNC: 72 MG/DL (ref 70–130)
GLUCOSE BLDC GLUCOMTR-MCNC: 91 MG/DL (ref 70–130)
GLUCOSE SERPL-MCNC: 73 MG/DL (ref 65–99)
HCT VFR BLD AUTO: 40.2 % (ref 37.5–51)
HGB BLD-MCNC: 14.3 G/DL (ref 13–17.7)
MCH RBC QN AUTO: 33.5 PG (ref 26.6–33)
MCHC RBC AUTO-ENTMCNC: 35.6 G/DL (ref 31.5–35.7)
MCV RBC AUTO: 94.1 FL (ref 79–97)
PLATELET # BLD AUTO: 211 10*3/MM3 (ref 140–450)
PMV BLD AUTO: 9.8 FL (ref 6–12)
POTASSIUM SERPL-SCNC: 4.1 MMOL/L (ref 3.5–5.2)
PROT SERPL-MCNC: 6.1 G/DL (ref 6–8.5)
RBC # BLD AUTO: 4.27 10*6/MM3 (ref 4.14–5.8)
SODIUM SERPL-SCNC: 134 MMOL/L (ref 136–145)
TSH SERPL DL<=0.05 MIU/L-ACNC: 2.96 UIU/ML (ref 0.27–4.2)
VIT B12 BLD-MCNC: 569 PG/ML (ref 211–946)
WBC NRBC COR # BLD: 7.28 10*3/MM3 (ref 3.4–10.8)

## 2022-05-11 PROCEDURE — 25010000002 HYDROMORPHONE PER 4 MG: Performed by: INTERNAL MEDICINE

## 2022-05-11 PROCEDURE — 80053 COMPREHEN METABOLIC PANEL: CPT | Performed by: NURSE PRACTITIONER

## 2022-05-11 PROCEDURE — 82746 ASSAY OF FOLIC ACID SERUM: CPT | Performed by: NURSE PRACTITIONER

## 2022-05-11 PROCEDURE — 82607 VITAMIN B-12: CPT | Performed by: NURSE PRACTITIONER

## 2022-05-11 PROCEDURE — 25010000002 LORAZEPAM PER 2 MG: Performed by: INTERNAL MEDICINE

## 2022-05-11 PROCEDURE — 85027 COMPLETE CBC AUTOMATED: CPT | Performed by: NURSE PRACTITIONER

## 2022-05-11 PROCEDURE — 84443 ASSAY THYROID STIM HORMONE: CPT | Performed by: NURSE PRACTITIONER

## 2022-05-11 PROCEDURE — 82962 GLUCOSE BLOOD TEST: CPT

## 2022-05-11 PROCEDURE — 25010000002 SODIUM CHLORIDE 0.9 % WITH KCL 20 MEQ 20-0.9 MEQ/L-% SOLUTION: Performed by: INTERNAL MEDICINE

## 2022-05-11 RX ORDER — DIAZEPAM 5 MG/ML
10 INJECTION, SOLUTION INTRAMUSCULAR; INTRAVENOUS ONCE
Status: DISCONTINUED | OUTPATIENT
Start: 2022-05-11 | End: 2022-05-12

## 2022-05-11 RX ORDER — DEXTROSE MONOHYDRATE 25 G/50ML
25 INJECTION, SOLUTION INTRAVENOUS
Status: DISCONTINUED | OUTPATIENT
Start: 2022-05-11 | End: 2022-05-21 | Stop reason: HOSPADM

## 2022-05-11 RX ORDER — CHLORDIAZEPOXIDE HYDROCHLORIDE 25 MG/1
50 CAPSULE, GELATIN COATED ORAL ONCE
Status: COMPLETED | OUTPATIENT
Start: 2022-05-11 | End: 2022-05-11

## 2022-05-11 RX ORDER — LORAZEPAM 2 MG/ML
4 INJECTION INTRAMUSCULAR ONCE
Status: COMPLETED | OUTPATIENT
Start: 2022-05-11 | End: 2022-05-11

## 2022-05-11 RX ORDER — NICOTINE POLACRILEX 4 MG
15 LOZENGE BUCCAL
Status: DISCONTINUED | OUTPATIENT
Start: 2022-05-11 | End: 2022-05-21 | Stop reason: HOSPADM

## 2022-05-11 RX ORDER — DEXTROSE MONOHYDRATE 25 G/50ML
INJECTION, SOLUTION INTRAVENOUS
Status: COMPLETED
Start: 2022-05-11 | End: 2022-05-11

## 2022-05-11 RX ADMIN — LORAZEPAM 1 MG: 1 TABLET ORAL at 12:16

## 2022-05-11 RX ADMIN — LORAZEPAM 4 MG: 2 INJECTION INTRAMUSCULAR; INTRAVENOUS at 14:40

## 2022-05-11 RX ADMIN — Medication 100 MG: at 08:52

## 2022-05-11 RX ADMIN — CHLORDIAZEPOXIDE HYDROCHLORIDE 25 MG: 25 CAPSULE ORAL at 00:18

## 2022-05-11 RX ADMIN — LORAZEPAM 2 MG: 2 INJECTION INTRAMUSCULAR; INTRAVENOUS at 13:33

## 2022-05-11 RX ADMIN — LORAZEPAM 2 MG: 2 INJECTION INTRAMUSCULAR; INTRAVENOUS at 00:26

## 2022-05-11 RX ADMIN — LORAZEPAM 2 MG: 2 INJECTION INTRAMUSCULAR; INTRAVENOUS at 18:20

## 2022-05-11 RX ADMIN — LORAZEPAM 2 MG: 2 INJECTION INTRAMUSCULAR; INTRAVENOUS at 11:16

## 2022-05-11 RX ADMIN — LORAZEPAM 2 MG: 2 INJECTION INTRAMUSCULAR; INTRAVENOUS at 10:53

## 2022-05-11 RX ADMIN — HYDROMORPHONE HYDROCHLORIDE 0.5 MG: 1 INJECTION, SOLUTION INTRAMUSCULAR; INTRAVENOUS; SUBCUTANEOUS at 23:44

## 2022-05-11 RX ADMIN — LORAZEPAM 2 MG: 2 INJECTION INTRAMUSCULAR; INTRAVENOUS at 03:34

## 2022-05-11 RX ADMIN — LORAZEPAM 1 MG: 1 TABLET ORAL at 04:57

## 2022-05-11 RX ADMIN — POTASSIUM CHLORIDE AND SODIUM CHLORIDE 100 ML/HR: 900; 150 INJECTION, SOLUTION INTRAVENOUS at 18:21

## 2022-05-11 RX ADMIN — LORAZEPAM 2 MG: 2 INJECTION INTRAMUSCULAR; INTRAVENOUS at 12:53

## 2022-05-11 RX ADMIN — NICOTINE 1 PATCH: 21 PATCH, EXTENDED RELEASE TRANSDERMAL at 08:57

## 2022-05-11 RX ADMIN — CHLORDIAZEPOXIDE HYDROCHLORIDE 25 MG: 25 CAPSULE ORAL at 11:43

## 2022-05-11 RX ADMIN — LORAZEPAM 2 MG: 2 INJECTION INTRAMUSCULAR; INTRAVENOUS at 13:54

## 2022-05-11 RX ADMIN — HYDROMORPHONE HYDROCHLORIDE 0.5 MG: 1 INJECTION, SOLUTION INTRAMUSCULAR; INTRAVENOUS; SUBCUTANEOUS at 03:33

## 2022-05-11 RX ADMIN — FAMOTIDINE 20 MG: 10 INJECTION INTRAVENOUS at 20:31

## 2022-05-11 RX ADMIN — LORAZEPAM 2 MG: 2 INJECTION INTRAMUSCULAR; INTRAVENOUS at 11:43

## 2022-05-11 RX ADMIN — POTASSIUM CHLORIDE AND SODIUM CHLORIDE 100 ML/HR: 900; 150 INJECTION, SOLUTION INTRAVENOUS at 02:32

## 2022-05-11 RX ADMIN — DEXTROSE MONOHYDRATE 25 G: 25 INJECTION, SOLUTION INTRAVENOUS at 16:45

## 2022-05-11 RX ADMIN — HYDROCODONE BITARTRATE AND ACETAMINOPHEN 1 TABLET: 7.5; 325 TABLET ORAL at 12:15

## 2022-05-11 RX ADMIN — HYDROMORPHONE HYDROCHLORIDE 0.5 MG: 1 INJECTION, SOLUTION INTRAMUSCULAR; INTRAVENOUS; SUBCUTANEOUS at 19:43

## 2022-05-11 RX ADMIN — LORAZEPAM 2 MG: 2 INJECTION INTRAMUSCULAR; INTRAVENOUS at 17:19

## 2022-05-11 RX ADMIN — LORAZEPAM 2 MG: 2 INJECTION INTRAMUSCULAR; INTRAVENOUS at 19:00

## 2022-05-11 RX ADMIN — CYCLOBENZAPRINE 10 MG: 10 TABLET, FILM COATED ORAL at 13:07

## 2022-05-11 RX ADMIN — LORAZEPAM 2 MG: 2 INJECTION INTRAMUSCULAR; INTRAVENOUS at 19:40

## 2022-05-11 RX ADMIN — LORAZEPAM 1 MG: 1 TABLET ORAL at 08:52

## 2022-05-11 RX ADMIN — CHLORDIAZEPOXIDE HYDROCHLORIDE 25 MG: 25 CAPSULE ORAL at 06:09

## 2022-05-11 RX ADMIN — LORAZEPAM 2 MG: 2 INJECTION INTRAMUSCULAR; INTRAVENOUS at 15:39

## 2022-05-11 RX ADMIN — LORAZEPAM 2 MG: 2 INJECTION INTRAMUSCULAR; INTRAVENOUS at 10:29

## 2022-05-11 RX ADMIN — LIDOCAINE 2 PATCH: 50 PATCH TOPICAL at 08:52

## 2022-05-11 RX ADMIN — CYCLOBENZAPRINE 10 MG: 10 TABLET, FILM COATED ORAL at 06:09

## 2022-05-11 RX ADMIN — LORAZEPAM 2 MG: 2 INJECTION INTRAMUSCULAR; INTRAVENOUS at 18:01

## 2022-05-11 RX ADMIN — CHLORDIAZEPOXIDE HYDROCHLORIDE 50 MG: 25 CAPSULE ORAL at 13:07

## 2022-05-11 RX ADMIN — LORAZEPAM 2 MG: 2 INJECTION INTRAMUSCULAR; INTRAVENOUS at 17:38

## 2022-05-11 RX ADMIN — LORAZEPAM 2 MG: 2 INJECTION INTRAMUSCULAR; INTRAVENOUS at 23:44

## 2022-05-11 RX ADMIN — LORAZEPAM 2 MG: 2 INJECTION INTRAMUSCULAR; INTRAVENOUS at 15:58

## 2022-05-11 RX ADMIN — Medication 10 ML: at 20:32

## 2022-05-11 RX ADMIN — LORAZEPAM 2 MG: 2 INJECTION INTRAMUSCULAR; INTRAVENOUS at 16:55

## 2022-05-11 NOTE — PLAN OF CARE
Goal Outcome Evaluation:  Plan of Care Reviewed With: patient           Outcome Evaluation: Patient arived to this floor in bilateral soft wrist restraints and posey vest. Pt has remained confused this shift and CIWA has remained high, c/o of back pain x1 and pain meds were given as ordered, pt was able to verbalize needs to this RN, but was convinced he was in a car.  PRN meds were given as ordered this shift.  see v/s and labs.

## 2022-05-11 NOTE — PLAN OF CARE
Problem: Restraint, Nonbehavioral (Nonviolent)  Goal: Absence of Harm or Injury  Intervention: Implement Least Restrictive Safety Strategies  Recent Flowsheet Documentation  Taken 5/11/2022 0600 by Nancy Owusu RN  Medical Device Protection:   IV pole/bag removed from visual field   torso covered   tubing secured  Less Restrictive Alternative:   bed alarm in use   calming techniques promoted  De-Escalation Techniques:   appropriate behavior reinforced   increased round frequency   quiet time facilitated   reoriented   stimulation decreased  Diversional Activities: television  Taken 5/11/2022 0400 by Nancy Owusu RN  Medical Device Protection:   IV pole/bag removed from visual field   torso covered   tubing secured  Less Restrictive Alternative:   bed alarm in use   calming techniques promoted  De-Escalation Techniques:   appropriate behavior reinforced   increased round frequency   quiet time facilitated   reoriented   stimulation decreased  Diversional Activities: television  Taken 5/11/2022 0200 by Nancy Owusu RN  Medical Device Protection:   IV pole/bag removed from visual field   torso covered   tubing secured  Less Restrictive Alternative:   bed alarm in use   calming techniques promoted  De-Escalation Techniques:   appropriate behavior reinforced   stimulation decreased   verbally redirected   quiet time facilitated  Diversional Activities: television  Taken 5/11/2022 0000 by Nancy Owusu RN  Medical Device Protection:   IV pole/bag removed from visual field   torso covered   tubing secured  Less Restrictive Alternative:   appropriate expression promoted   bed alarm in use   calming techniques promoted   positive reinforcement provided   safety enhancements provided   sensory stimulation limited  De-Escalation Techniques:   appropriate behavior reinforced   increased round frequency   stimulation decreased   time out facilitated   verbally redirected  Diversional Activities: television  Taken 5/10/2022  2201 by Nancy Owusu, RN  Medical Device Protection:   IV pole/bag removed from visual field   torso covered   tubing secured  Less Restrictive Alternative:   appropriate expression promoted   bed alarm in use   calming techniques promoted   positive reinforcement provided   safety enhancements provided   sensory stimulation limited  De-Escalation Techniques:   appropriate behavior reinforced   increased round frequency   quiet time facilitated   reoriented   stimulation decreased   time out facilitated   verbally redirected  Diversional Activities: television   Goal Outcome Evaluation:  Plan of Care Reviewed With: patient           Outcome Evaluation: Patient arived to this floor in bilateral soft wrist restraints and posey vest. Pt has remained confused this shift and CIWA has remained high, c/o of back pain x1 and pain meds were given as ordered, pt was able to verbalize needs to this RN, but was convinced he was in a car.  PRN meds were given as ordered this shift.  see v/s and labs.

## 2022-05-11 NOTE — CASE MANAGEMENT/SOCIAL WORK
Discharge Planning Assessment  [facility]     Patient Name: Bogdan Jeter  MRN: [id number]  Today's Date: 5/11/2022    Admit Date: 5/9/2022     Discharge Needs Assessment     Row Name 05/11/22 1030       Living Environment    People in Home friend(s)    Name(s) of People in Home José Miguel Turner    Current Living Arrangements apartment    Primary Care Provided by self    Provides Primary Care For no one    Family Caregiver if Needed parent(s)    Family Caregiver Names Domenic Novoa/Father - [phone]    Quality of Family Relationships helpful;involved;supportive    Able to Return to Prior Arrangements yes       Resource/Environmental Concerns    Resource/Environmental Concerns home accessibility    Home Accessibility Concerns stairs to enter home    Transportation Concerns none       Transition Planning    Patient/Family Anticipates Transition to home    Patient/Family Anticipated Services at Transition none    Transportation Anticipated family or friend will provide       Discharge Needs Assessment    Readmission Within the Last 30 Days no previous admission in last 30 days    Equipment Currently Used at Home none    Concerns to be Addressed no discharge needs identified;denies needs/concerns at this time    Anticipated Changes Related to Illness none    Equipment Needed After Discharge none    Provided Post Acute Provider List? N/A    N/A Provider List Comment Patient plans to return home.    Provided Post Acute Provider Quality & Resource List? N/A    N/A Quality & Resource List Comment Patient plans to return home.               Discharge Plan     Row Name 05/11/22 1032       Plan    Plan Home    Patient/Family in Agreement with Plan yes    Plan Comments CCP met with patient at bedside introduced self and explained role. Patient was able to confirm his address was accurate and that he lives in his 3rd floor apartment with a friend named José Miguel. Patient unable to participate further in screen as he fell asleep  and was not waking up to [facility] voice. [facility] returned to the [facility] office and made an outbound call to patient’s father Domenic O’Peter, [phone]. Patient’s father Domenic answered the phone and was agreeable to [facility] completing screen with his via telephone. Domenic stated patient’s address is accurate and he lives in the apartment with his friend José Miguel Turner whom he rents the apartment from. Domenic confirmed that patient is normally independent with ADL’s and he works full-time at "[employer]. Domenic denies history of HH or SNF. Domenic denies patient owning or using any medical equipment. Domenic confirmed patient has Passport for his health insurance. Domenic was unable to confirm patient’s pharmacy or his PCP. Domenic agreeable to transport patient home at discharge.              Continued Care and Services - Admitted Since 5/9/2022    Coordination has not been started for this encounter.          Demographic Summary     Row Name 05/11/22 1030       General Information    Admission Type inpatient    Arrived From home    Reason for Consult discharge planning    Preferred Language English               Functional Status     Row Name 05/11/22 1030       Functional Status    Usual Activity Tolerance good    Current Activity Tolerance moderate       Functional Status, IADL    Medications independent    Meal Preparation independent    Housekeeping independent    Laundry independent    Shopping independent       Mental Status    General Appearance WDL WDL       Mental Status Summary    Recent Changes in Mental Status/Cognitive Functioning no changes       Employment/    Employment Status employed full-time               Psychosocial    No documentation.                Abuse/Neglect    No documentation.                Legal    No documentation.                Substance Abuse    No documentation.                Patient Forms    No documentation.

## 2022-05-11 NOTE — PROGRESS NOTES
LOS: 2 days     Subjective :   Patient seen and examined.  He is resting comfortably in his hospital bed.  He does have soft wrist restraints as well as a Cally vest in place.  Evidently he has had increased confusion related to alcohol withdrawals and is on CIWA protocol.  Currently receiving Librium, Ativan, p.o. Norco/Dilaudid for pain.  Patient does initially respond to questions and states no change in his back pain.  He does however promptly fall asleep during discussion.  No complaint of radicular pain at present.    Objective :    Vital signs in last 24 hours:  Vitals:    05/10/22 2201 05/11/22 0000 05/11/22 0200 05/11/22 0400   BP: (!) 143/116 125/88  124/97   BP Location: Right arm Right arm  Right arm   Patient Position: Lying Lying  Lying   Pulse: 108 120  119   Resp: 20 18  18   Temp: 98.1 °F (36.7 °C) 98.2 °F (36.8 °C)  98.1 °F (36.7 °C)   TempSrc: Oral Oral  Oral   SpO2: 92% 97% 98% 97%   Weight:       Height:           PHYSICAL EXAM:  Patient is calm, in no acute distress, easily awakened, mildly confused but does answer appropriately to the majority of questions    He is nontender to palpation of the bony prominences of the lumbar spine as well as the paraspinal musculature.  No pain on palpation in the area of the posterior superior iliac spine and SI joints.  No pain with passive hip or knee motion.  Patient actually fell asleep during range of motion exam.  EHL, FHL, TA, GS intact.    LABS:  Results from last 7 days   Lab Units 05/10/22  0459   WBC 10*3/mm3 7.36   HEMOGLOBIN g/dL 13.9   HEMATOCRIT % 38.5   PLATELETS 10*3/mm3 215     Results from last 7 days   Lab Units 05/10/22  0459   SODIUM mmol/L 138   POTASSIUM mmol/L 3.8   CHLORIDE mmol/L 101   CO2 mmol/L 25.2   BUN mg/dL 7   CREATININE mg/dL 0.78   GLUCOSE mg/dL 83   CALCIUM mg/dL 8.2*     Findings  MRI EXAMINATION OF THE LUMBAR SPINE WITH AND WITHOUT CONTRAST    HISTORY: Back pain, left radiculopathy.    COMPARISON:  None.    FINDINGS: The alignment of the lumbar spine is within normal limits.  Mild disc desiccation and loss of disc height is noted at L4-L5 and  L5-S1. The conus is at T12-L1.    L1-L2: There is no evidence of disc bulge or herniation.    L2-L3: There is no evidence of disc bulge or herniation.    L3-L4: A mild central broad-based disc osteophyte complex is present  which results in mild flattening of ventral surface of the thecal sac.  Mild facet degenerative disease is present bilaterally.    L4-L5: Mild-to-moderate facet degenerative disease is present  bilaterally. There is mild central canal stenosis and mild-to-moderate  lateral recess narrowing bilaterally secondary to a broad-based disc  osteophyte complex and posterior element degenerative disease. This is  accentuated by congenitally shortened pedicles. Mild foraminal stenosis  is present bilaterally.    L5-S1: Transitional anatomy is appreciated with partial sacralization of  L5. Mild facet degenerative disease is present bilaterally. A central  disc osteophyte complex is present which results in mild flattening of  ventral surface of the thecal sac. It approaches but does not abut or  displace the traversing S1 nerve roots. Mild facet degenerative disease  is present bilaterally.   Report Conclusions  IMPRESSION:   No evidence of disc herniation, compression fracture or  compression deformity. Multilevel degenerative disease involving the  lumbar spine is noted as described above including mild facet  degenerative disease and broad-based disc osteophyte complexes. The  degenerative disease is accentuated by congenitally shortened pedicles  at L4-L5 and L5-S1. At L4-L5 there is mild central canal stenosis and  mild-to-moderate lateral recess narrowing bilaterally. A central disc  osteophyte complex is present at L5-S1 which approaches but does not  abut or displace the traversing S1 nerve roots.    This report was finalized on 5/10/2022 2:06 PM by   Fabiano Godwin M.D.       ASSESSMENT:  Axial low back pain    Plan:  Various courses of potential treatment were discussed with the patient at bedside this morning.  He does remain somewhat confused, but stated his understanding initially.    MRI findings are largely unremarkable with multilevel degenerative changes.  Small focal area of increased signal within the vertebral bodies of L4 and L5, but no concern for significant injury.    Did discuss with the patient that, in all likelihood is acute exacerbation of low back pain will resolve without surgical intervention.  Agree with hospitalist service plan for symptom control with pain medication/muscle relaxants.  He may also benefit from a Medrol Dosepak.  Could consider the addition of gabapentin for continuing radicular symptoms, but the patient had no complaint of this this morning.  He will also benefit from physical therapy.    Could potentially consider epidural injection for failure of physical therapy after course of 4 to 6 weeks.  Would be happy to follow-up with the patient in the office as an outpatient and evaluate his progress.    We will also review findings as well as assessment and plan of care with my collaborative physician and update as necessary.    Thank you for the opportunity to participate in this patient's care.    Orthopedics will sign off at this point.    Please call with any questions or concerns.  [phone]    Imaging, reports, assessment, and plan of care were reviewed with my collaborative physician, Fidencio Byrd MD.  He was in agreement.    Lucien Kohli, APRN    Date: 5/11/2022  Time: 06:34 EDT

## 2022-05-11 NOTE — PROGRESS NOTES
Name: Bogdan Jeter ADMIT: 2022   : 1975  PCP: Provider, No Known    MRN: [id number] LOS: 2 days   AGE/SEX: 46 y.o. male  ROOM: P580/1     Subjective   Subjective   Resting in bed. Had some confusion/agitation overnight and subsequently fell. He was placed in restraints. Dr. Payton added scheduled Librium last night. Currently patient is in restraints and asking to be removed from this. He reports back pain is the same, but wants to get out of bed. Able to state name/place/situation/year. Denies any dyspnea, cough, chest pain.    Later notified by nursing around 1230 that patient's CIWA rising- 10s up to 20s now. Has gotten about 8 mg of Ativan in that time period. Received Librium 25 mg x 1.  He is more agitated and fighting restraints.    Went back to reassess patient at 1330- notably worse than this morning- disoriented x 4, agitated, thinks he needs to go to the grocery store. Received 50 mg Librium about 30 minutes ago- informed nurse to give 2 mg Ativan now. Nursing manager present- high acuity patient needing 1:1 care at this time- transfer to ICU for patient safety.     Objective   Objective   Vital Signs  Temp:  [97.5 °F (36.4 °C)-98.2 °F (36.8 °C)] 97.9 °F (36.6 °C)  Heart Rate:  [] 119  Resp:  [16-20] 18  BP: (124-165)/() 130/105  SpO2:  [92 %-98 %] 96 %  on  Flow (L/min):  [2] 2;   Device (Oxygen Therapy): nasal cannula  Body mass index is 22.32 kg/m².     Physical Exam at 0950  Vitals and nursing note reviewed.   Constitutional:       Appearance: He is ill-appearing. He is not toxic-appearing.   HENT:      Head: Normocephalic and atraumatic.   Cardiovascular:      Rate and Rhythm: Normal rate and regular rhythm.      Pulses: Normal pulses.      Heart sounds: Normal heart sounds.   Pulmonary:      Effort: Pulmonary effort is normal. No respiratory distress.      Comments: Diminished on RA  Abdominal:      General: Bowel sounds are normal. There is no distension.       Palpations: Abdomen is soft.      Tenderness: There is no abdominal tenderness.   Musculoskeletal:         General: Tenderness present. No swelling.      Cervical back: Normal range of motion and neck supple.      Comments: mild weakness in left leg  Skin:     General: Skin is warm and dry.      Findings: No bruising.      Comments: Multiple tattoos   Neurological:      Mental Status: He is alert and oriented to person, place, and time.      Sensory: Sensory deficit present.      Motor: Weakness present.      Coordination: Coordination normal.      Comments: Mild tremors on exam/ slightly restless  Psychiatric:         Behavior: Behavior normal.      Comments: Slightly irritable, but cooperative.     Later assessment at 1330- confused/disoriented x 4/fighting restraints/diaphoretic    Results Review:       I reviewed the patient's new clinical results.  Results from last 7 days   Lab Units 05/11/22  0559 05/10/22  0459 05/09/22  1414   WBC 10*3/mm3 7.28 7.36 8.08   HEMOGLOBIN g/dL 14.3 13.9 14.8   PLATELETS 10*3/mm3 211 215 241     Results from last 7 days   Lab Units 05/11/22  0559 05/10/22  0459 05/09/22  1414   SODIUM mmol/L 134* 138 138   POTASSIUM mmol/L 4.1 3.8 3.2*   CHLORIDE mmol/L 97* 101 101   CO2 mmol/L 23.0 25.2 22.7   BUN mg/dL 6 7 3*   CREATININE mg/dL 0.56* 0.78 0.62*   GLUCOSE mg/dL 73 83 80   Estimated Creatinine Clearance: 167.5 mL/min (A) (by C-G formula based on SCr of 0.56 mg/dL (L)).  Results from last 7 days   Lab Units 05/11/22  0559 05/10/22  0459 05/09/22  1414   ALBUMIN g/dL 3.30* 3.20* 3.50   BILIRUBIN mg/dL 0.9 1.3* 0.7   ALK PHOS U/L 119* 126* 117   AST (SGOT) U/L 50* 69* 99*   ALT (SGPT) U/L 33 44* 47*     Results from last 7 days   Lab Units 05/11/22  0559 05/10/22  0459 05/09/22  1414   CALCIUM mg/dL 8.6 8.2* 8.2*   ALBUMIN g/dL 3.30* 3.20* 3.50       No results found for: HGBA1C, POCGLU    chlordiazePOXIDE, 25 mg, Oral, Q6H  cyclobenzaprine, 10 mg, Oral, Q8H  famotidine, 20 mg,  Intravenous, Q12H  lidocaine, 2 patch, Transdermal, Q24H  LORazepam, 1 mg, Oral, Q8H  nicotine, 1 patch, Transdermal, Q24H  sodium chloride, 10 mL, Intravenous, Q12H  thiamine, 100 mg, Oral, Daily      sodium chloride 0.9 % with KCl 20 mEq, 100 mL/hr, Last Rate: 100 mL/hr (05/11/22 0232)    Diet Regular       Assessment/Plan     Active Hospital Problems    Diagnosis  POA   • **Intractable back pain [M54.9]  Yes   • Hypokalemia [E87.6]  Yes   • Tobacco abuse [Z72.0]  Yes   • Alcohol withdrawal (HCC) [F10.239]  Yes   • Heart burn [R12]  Yes      Resolved Hospital Problems   No resolved problems to display.     Mr. Jeter is a 46 year old male who presented to the hospital with complaints of back pain. He has a history of daily alcohol use with 6-7 beers a day.     · Intractable back pain: MRI now returned with some DDD but no acute fracture/compression/disc herniation. L4-5 with shortened pedicles and mild central canal stenosis. Nothing surgical it seems. Orthopedic surgery was consulted- they agree with supportive care with pain meds, etc. PT evaluation ordered. Will treat symptoms with pain control and muscle relaxants. Lidoderm in place. Could consider Medrol dose pack and/or gabapentin if no improvement- however with current alcohol withdrawal will need to hold off. Steroids could worsen psychosis.  · ETOH abuse/alcohol withdrawal: Last CIWA 22 after repeat Ativan as well as Librium 50 mg in addition to prior 25 mg- notably worse on exam than this morning- transfer to ICU at this time. Continue Librium and alcohol W/D protocol. May likely need Ativan/Precedex drip. Consult Pulm.  · Tobacco abuse: Nicotine patch in place.  · Hypokalemia: Resolved.   · Heartburn: On scheduled acid reducer.    Note of tachycardia and elevated BP-most likely d/t withdrawal. Transfer to higher level of care.     Discussed with patient, nursing staff and Dr. Payton.     VTE Prophylaxis - SCDs  Code Status - Full code  Disposition -  Anticipate discharge TBD. Need to address alcohol withdrawal first.     JESSE Fields  [facility]  05/11/22  09:50 EDT

## 2022-05-11 NOTE — CONSULTS
"Group: [facility]         CONSULT NOTE    Patient Identification:  Bogdan Jeter  46 y.o.  male  1975  [id number]            Requesting physician: Dr Payton    Reason for Consultation: Acute alcohol withdrawal    CC: Confusion    History of Present Illness:  46-year-old male patient who was admitted to this hospital 48 hours ago for lower back pain and weakness in his left leg.  He was admitted here for evaluation of his back pain.  He has no previous history of back pain.  He works at a [employer].  While he was at work he suddenly developed pain in his lower back and left leg.  The patient drinks alcohol daily.  Now he is starting to hallucinate, becoming agitated and restless and delirious.  He is requiring physical restraints after having significant Ativan IV as well as Librium by mouth.  He is being transferred to the intensive care unit for further care..      Review of Systems   Unable to perform ROS: Mental status change       Past Medical History:  No past medical history on file.    Past Surgical History:  No past surgical history on file.     Home Meds:  Reviewed    Allergies:  No Known Allergies    Social History:   Social History     Socioeconomic History   • Marital status: Single   Tobacco Use   • Smoking status: Current Every Day Smoker     Packs/day: 1.00     Years: 15.00     Pack years: 15.00     Types: Cigarettes   Vaping Use   • Vaping Use: Never used   Substance and Sexual Activity   • Alcohol use: Yes     Alcohol/week: 5.0 standard drinks     Types: 5 Cans of beer per week     Comment: daily   • Drug use: Yes     Frequency: 1.0 times per week     Types: Marijuana   • Sexual activity: Defer       Family History:  No family history on file.    Physical Exam:  /95 (BP Location: Left arm, Patient Position: Lying)   Pulse (!) 137   Temp 99.6 °F (37.6 °C) (Oral)   Resp 20   Ht 180.3 cm (71\")   Wt 72.6 kg (160 lb)   SpO2 98%   BMI 22.32 kg/m²  Body mass index is 22.32 " kg/m². 98% 72.6 kg (160 lb)  Physical Exam  Constitutional:       Appearance: He is well-developed. He is ill-appearing.   HENT:      Right Ear: External ear normal.      Left Ear: External ear normal.      Nose: Nose normal.   Eyes:      General: No scleral icterus.     Conjunctiva/sclera: Conjunctivae normal.      Pupils: Pupils are equal, round, and reactive to light.   Neck:      Thyroid: No thyromegaly.      Vascular: No JVD.   Cardiovascular:      Rate and Rhythm: Normal rate and regular rhythm.      Heart sounds: No murmur heard.     Comments: No edema  Pulmonary:      Effort: Pulmonary effort is normal.      Breath sounds: No wheezing or rales.   Abdominal:      General: There is no distension.      Palpations: Abdomen is soft.      Comments: No liver or spleen enlargment palpable   Musculoskeletal:         General: No deformity.      Cervical back: Neck supple. No rigidity.      Comments: No deformity in all 4 extrem   Skin:     Findings: No erythema or rash.      Comments: No palpable nodules   Neurological:      Comments: He does move all 4 extremities on command.  He opens his eyes and he tells me his name and his date of birth but he is confused.  Currently in four-point restraints   Psychiatric:      Comments: He has poor judgment, disoriented         LABS:  COVID19   Date Value Ref Range Status   05/10/2022 Not Detected Not Detected - Ref. Range Final       Lab Results   Component Value Date    CALCIUM 8.6 05/11/2022     Results from last 7 days   Lab Units 05/11/22  0559 05/10/22  0459 05/09/22  1414   SODIUM mmol/L 134* 138 138   POTASSIUM mmol/L 4.1 3.8 3.2*   CHLORIDE mmol/L 97* 101 101   CO2 mmol/L 23.0 25.2 22.7   BUN mg/dL 6 7 3*   CREATININE mg/dL 0.56* 0.78 0.62*   GLUCOSE mg/dL 73 83 80   CALCIUM mg/dL 8.6 8.2* 8.2*   WBC 10*3/mm3 7.28 7.36 8.08   HEMOGLOBIN g/dL 14.3 13.9 14.8   PLATELETS 10*3/mm3 211 215 241   ALT (SGPT) U/L 33 44* 47*   AST (SGOT) U/L 50* 69* 99*         Lab Results    Component Value Date    TSH 2.960 05/11/2022          Imaging: I personally visualized the images of       Assessment:  Acute alcohol withdrawal  Intractable back pain  L4-L5 mild central canal stenosis      Recommendations:  Admit to the intensive care unit.  Give IV Ativan until patient mildly sedated and no longer a threat to self or others.  Apply four-point restraints at this moment until patient responds to IV Ativan and Valium.  When patient can take p.o. safely, resume Librium.  Check morning labs, magnesium, phosphorus and potassium and replace electrolytes as needed.      Jose Alberto Ferguson MD  5/11/2022  15:56 EDT      Much of this encounter note is an electronic transcription/translation of spoken language to printed text using Dragon Software.

## 2022-05-11 NOTE — SIGNIFICANT NOTE
05/11/22 1406   OTHER   Discipline physical therapist   Rehab Time/Intention   Session Not Performed unable to treat, medical status change  (pt is being transferred to ICU, PT will follow up tomorrow to assess if pt is able to initiate PT)   Recommendation   PT - Next Appointment 05/12/22

## 2022-05-11 NOTE — NURSING NOTE
Pt fell unassisted in room at 2000. Pt bed alarm started going off and when nurse entered room pt was standing with walker took a step and fell to floor. Pt was not injured, pt was educated and informed by nurse to stay on ground so nurse could get assistance to help pt back to bed. Pt did not listen and proceeded to get up and fell again. Nursing staff came in and assisted x2 pt back into bed. At this point pt CIWA was in the 30s. Provider aware of fall and impulsiveness and ordered restraints. House, charge nurse, and pts father aware of situation. Pt CIWA continued to climb regardless of Q15min ativan. Pt heart rate and BP were elevated. Pt was sweating profusely and was not appropriate for non tele floor. [facility] notified and order for transfer to tele floor was placed. Pt transferred to [id number]. Pt father, house, and charge nurse aware of entire situation.

## 2022-05-12 LAB
ALBUMIN SERPL-MCNC: 3.4 G/DL (ref 3.5–5.2)
ALBUMIN/GLOB SERPL: 1.1 G/DL
ALP SERPL-CCNC: 122 U/L (ref 39–117)
ALT SERPL W P-5'-P-CCNC: 29 U/L (ref 1–41)
ANION GAP SERPL CALCULATED.3IONS-SCNC: 13.7 MMOL/L (ref 5–15)
AST SERPL-CCNC: 42 U/L (ref 1–40)
BASOPHILS # BLD AUTO: 0.06 10*3/MM3 (ref 0–0.2)
BASOPHILS NFR BLD AUTO: 0.7 % (ref 0–1.5)
BILIRUB SERPL-MCNC: 0.9 MG/DL (ref 0–1.2)
BUN SERPL-MCNC: 7 MG/DL (ref 6–20)
BUN/CREAT SERPL: 10.4 (ref 7–25)
CALCIUM SPEC-SCNC: 8.9 MG/DL (ref 8.6–10.5)
CHLORIDE SERPL-SCNC: 99 MMOL/L (ref 98–107)
CO2 SERPL-SCNC: 21.3 MMOL/L (ref 22–29)
CREAT SERPL-MCNC: 0.67 MG/DL (ref 0.76–1.27)
DEPRECATED RDW RBC AUTO: 45 FL (ref 37–54)
EGFRCR SERPLBLD CKD-EPI 2021: 115.9 ML/MIN/1.73
EOSINOPHIL # BLD AUTO: 0.16 10*3/MM3 (ref 0–0.4)
EOSINOPHIL NFR BLD AUTO: 1.7 % (ref 0.3–6.2)
ERYTHROCYTE [DISTWIDTH] IN BLOOD BY AUTOMATED COUNT: 12.9 % (ref 12.3–15.4)
GLOBULIN UR ELPH-MCNC: 3.2 GM/DL
GLUCOSE BLDC GLUCOMTR-MCNC: 100 MG/DL (ref 70–130)
GLUCOSE BLDC GLUCOMTR-MCNC: 109 MG/DL (ref 70–130)
GLUCOSE BLDC GLUCOMTR-MCNC: 149 MG/DL (ref 70–130)
GLUCOSE BLDC GLUCOMTR-MCNC: 68 MG/DL (ref 70–130)
GLUCOSE BLDC GLUCOMTR-MCNC: 99 MG/DL (ref 70–130)
GLUCOSE SERPL-MCNC: 56 MG/DL (ref 65–99)
HCT VFR BLD AUTO: 39.6 % (ref 37.5–51)
HGB BLD-MCNC: 14 G/DL (ref 13–17.7)
IMM GRANULOCYTES # BLD AUTO: 0.04 10*3/MM3 (ref 0–0.05)
IMM GRANULOCYTES NFR BLD AUTO: 0.4 % (ref 0–0.5)
LYMPHOCYTES # BLD AUTO: 1.79 10*3/MM3 (ref 0.7–3.1)
LYMPHOCYTES NFR BLD AUTO: 19.5 % (ref 19.6–45.3)
MAGNESIUM SERPL-MCNC: 1.8 MG/DL (ref 1.6–2.6)
MCH RBC QN AUTO: 33.9 PG (ref 26.6–33)
MCHC RBC AUTO-ENTMCNC: 35.4 G/DL (ref 31.5–35.7)
MCV RBC AUTO: 95.9 FL (ref 79–97)
MONOCYTES # BLD AUTO: 0.78 10*3/MM3 (ref 0.1–0.9)
MONOCYTES NFR BLD AUTO: 8.5 % (ref 5–12)
NEUTROPHILS NFR BLD AUTO: 6.34 10*3/MM3 (ref 1.7–7)
NEUTROPHILS NFR BLD AUTO: 69.2 % (ref 42.7–76)
NRBC BLD AUTO-RTO: 0 /100 WBC (ref 0–0.2)
PHOSPHATE SERPL-MCNC: 3.6 MG/DL (ref 2.5–4.5)
PLATELET # BLD AUTO: 203 10*3/MM3 (ref 140–450)
PMV BLD AUTO: 10.2 FL (ref 6–12)
POTASSIUM SERPL-SCNC: 4.1 MMOL/L (ref 3.5–5.2)
PROT SERPL-MCNC: 6.6 G/DL (ref 6–8.5)
RBC # BLD AUTO: 4.13 10*6/MM3 (ref 4.14–5.8)
SODIUM SERPL-SCNC: 134 MMOL/L (ref 136–145)
WBC NRBC COR # BLD: 9.17 10*3/MM3 (ref 3.4–10.8)

## 2022-05-12 PROCEDURE — 25010000002 MAGNESIUM SULFATE IN D5W 1G/100ML (PREMIX) 1-5 GM/100ML-% SOLUTION: Performed by: INTERNAL MEDICINE

## 2022-05-12 PROCEDURE — 25010000002 LORAZEPAM PER 2 MG: Performed by: INTERNAL MEDICINE

## 2022-05-12 PROCEDURE — 85025 COMPLETE CBC W/AUTO DIFF WBC: CPT | Performed by: INTERNAL MEDICINE

## 2022-05-12 PROCEDURE — 25010000002 THIAMINE PER 100 MG: Performed by: INTERNAL MEDICINE

## 2022-05-12 PROCEDURE — 25010000002 SODIUM CHLORIDE 0.9 % WITH KCL 20 MEQ 20-0.9 MEQ/L-% SOLUTION: Performed by: INTERNAL MEDICINE

## 2022-05-12 PROCEDURE — 84100 ASSAY OF PHOSPHORUS: CPT | Performed by: INTERNAL MEDICINE

## 2022-05-12 PROCEDURE — 80053 COMPREHEN METABOLIC PANEL: CPT | Performed by: INTERNAL MEDICINE

## 2022-05-12 PROCEDURE — 82962 GLUCOSE BLOOD TEST: CPT

## 2022-05-12 PROCEDURE — 83735 ASSAY OF MAGNESIUM: CPT | Performed by: INTERNAL MEDICINE

## 2022-05-12 RX ORDER — POTASSIUM CHLORIDE 7.45 MG/ML
10 INJECTION INTRAVENOUS
Status: DISCONTINUED | OUTPATIENT
Start: 2022-05-12 | End: 2022-05-21 | Stop reason: HOSPADM

## 2022-05-12 RX ORDER — DEXMEDETOMIDINE HYDROCHLORIDE 4 UG/ML
.2-1.5 INJECTION INTRAVENOUS
Status: DISCONTINUED | OUTPATIENT
Start: 2022-05-12 | End: 2022-05-14

## 2022-05-12 RX ORDER — DEXTROSE, SODIUM CHLORIDE, AND POTASSIUM CHLORIDE 5; .9; .15 G/100ML; G/100ML; G/100ML
30 INJECTION INTRAVENOUS CONTINUOUS
Status: DISCONTINUED | OUTPATIENT
Start: 2022-05-12 | End: 2022-05-14

## 2022-05-12 RX ORDER — MAGNESIUM SULFATE 1 G/100ML
1 INJECTION INTRAVENOUS ONCE
Status: COMPLETED | OUTPATIENT
Start: 2022-05-12 | End: 2022-05-12

## 2022-05-12 RX ORDER — POTASSIUM CHLORIDE 750 MG/1
40 TABLET, FILM COATED, EXTENDED RELEASE ORAL AS NEEDED
Status: DISCONTINUED | OUTPATIENT
Start: 2022-05-12 | End: 2022-05-21 | Stop reason: HOSPADM

## 2022-05-12 RX ORDER — LORAZEPAM 2 MG/ML
1 INJECTION INTRAMUSCULAR EVERY 8 HOURS
Status: DISCONTINUED | OUTPATIENT
Start: 2022-05-12 | End: 2022-05-17

## 2022-05-12 RX ORDER — POTASSIUM CHLORIDE 1.5 G/1.77G
40 POWDER, FOR SOLUTION ORAL AS NEEDED
Status: DISCONTINUED | OUTPATIENT
Start: 2022-05-12 | End: 2022-05-21 | Stop reason: HOSPADM

## 2022-05-12 RX ADMIN — LORAZEPAM 2 MG: 2 INJECTION INTRAMUSCULAR; INTRAVENOUS at 16:05

## 2022-05-12 RX ADMIN — LORAZEPAM 2 MG: 2 INJECTION INTRAMUSCULAR; INTRAVENOUS at 18:48

## 2022-05-12 RX ADMIN — LORAZEPAM 2 MG: 2 INJECTION INTRAMUSCULAR; INTRAVENOUS at 17:20

## 2022-05-12 RX ADMIN — MAGNESIUM SULFATE 1 G: 1 INJECTION INTRAVENOUS at 22:19

## 2022-05-12 RX ADMIN — LORAZEPAM 2 MG: 2 INJECTION INTRAMUSCULAR; INTRAVENOUS at 17:05

## 2022-05-12 RX ADMIN — LORAZEPAM 2 MG: 2 INJECTION INTRAMUSCULAR; INTRAVENOUS at 13:36

## 2022-05-12 RX ADMIN — LORAZEPAM 2 MG: 2 INJECTION INTRAMUSCULAR; INTRAVENOUS at 13:20

## 2022-05-12 RX ADMIN — LORAZEPAM 2 MG: 2 INJECTION INTRAMUSCULAR; INTRAVENOUS at 16:50

## 2022-05-12 RX ADMIN — FAMOTIDINE 20 MG: 10 INJECTION INTRAVENOUS at 22:19

## 2022-05-12 RX ADMIN — LORAZEPAM 2 MG: 2 INJECTION INTRAMUSCULAR; INTRAVENOUS at 15:50

## 2022-05-12 RX ADMIN — THIAMINE HYDROCHLORIDE 200 MG: 100 INJECTION, SOLUTION INTRAMUSCULAR; INTRAVENOUS at 09:53

## 2022-05-12 RX ADMIN — LORAZEPAM 2 MG: 2 INJECTION INTRAMUSCULAR; INTRAVENOUS at 15:05

## 2022-05-12 RX ADMIN — LORAZEPAM 2 MG: 2 INJECTION INTRAMUSCULAR; INTRAVENOUS at 16:35

## 2022-05-12 RX ADMIN — POTASSIUM CHLORIDE, DEXTROSE MONOHYDRATE AND SODIUM CHLORIDE 100 ML/HR: 150; 5; 900 INJECTION, SOLUTION INTRAVENOUS at 09:53

## 2022-05-12 RX ADMIN — LORAZEPAM 2 MG: 2 INJECTION INTRAMUSCULAR; INTRAVENOUS at 14:00

## 2022-05-12 RX ADMIN — FAMOTIDINE 20 MG: 10 INJECTION INTRAVENOUS at 08:22

## 2022-05-12 RX ADMIN — Medication 10 ML: at 08:18

## 2022-05-12 RX ADMIN — DEXTROSE MONOHYDRATE 25 G: 25 INJECTION, SOLUTION INTRAVENOUS at 05:47

## 2022-05-12 RX ADMIN — LORAZEPAM 1 MG: 2 INJECTION INTRAMUSCULAR; INTRAVENOUS at 08:23

## 2022-05-12 RX ADMIN — LORAZEPAM 2 MG: 2 INJECTION INTRAMUSCULAR; INTRAVENOUS at 18:05

## 2022-05-12 RX ADMIN — DEXMEDETOMIDINE HYDROCHLORIDE 0.2 MCG/KG/HR: 4 INJECTION INTRAVENOUS at 19:58

## 2022-05-12 RX ADMIN — LORAZEPAM 2 MG: 2 INJECTION INTRAMUSCULAR; INTRAVENOUS at 15:35

## 2022-05-12 RX ADMIN — LORAZEPAM 2 MG: 2 INJECTION INTRAMUSCULAR; INTRAVENOUS at 18:20

## 2022-05-12 RX ADMIN — LORAZEPAM 2 MG: 2 INJECTION INTRAMUSCULAR; INTRAVENOUS at 15:20

## 2022-05-12 RX ADMIN — LORAZEPAM 2 MG: 2 INJECTION INTRAMUSCULAR; INTRAVENOUS at 19:57

## 2022-05-12 RX ADMIN — LORAZEPAM 2 MG: 2 INJECTION INTRAMUSCULAR; INTRAVENOUS at 14:18

## 2022-05-12 RX ADMIN — LORAZEPAM 2 MG: 2 INJECTION INTRAMUSCULAR; INTRAVENOUS at 14:48

## 2022-05-12 RX ADMIN — LORAZEPAM 2 MG: 2 INJECTION INTRAMUSCULAR; INTRAVENOUS at 16:20

## 2022-05-12 RX ADMIN — FOLIC ACID 1 MG: 5 INJECTION, SOLUTION INTRAMUSCULAR; INTRAVENOUS; SUBCUTANEOUS at 09:53

## 2022-05-12 RX ADMIN — LORAZEPAM 2 MG: 2 INJECTION INTRAMUSCULAR; INTRAVENOUS at 14:33

## 2022-05-12 RX ADMIN — DEXTROSE MONOHYDRATE 25 G: 25 INJECTION, SOLUTION INTRAVENOUS at 11:20

## 2022-05-12 RX ADMIN — Medication 10 ML: at 20:22

## 2022-05-12 RX ADMIN — DEXMEDETOMIDINE HYDROCHLORIDE 1.4 MCG/KG/HR: 4 INJECTION INTRAVENOUS at 22:19

## 2022-05-12 RX ADMIN — LORAZEPAM 2 MG: 2 INJECTION INTRAMUSCULAR; INTRAVENOUS at 17:35

## 2022-05-12 RX ADMIN — NICOTINE 1 PATCH: 21 PATCH, EXTENDED RELEASE TRANSDERMAL at 08:17

## 2022-05-12 RX ADMIN — POTASSIUM CHLORIDE AND SODIUM CHLORIDE 100 ML/HR: 900; 150 INJECTION, SOLUTION INTRAVENOUS at 05:47

## 2022-05-12 RX ADMIN — LORAZEPAM 2 MG: 2 INJECTION INTRAMUSCULAR; INTRAVENOUS at 17:50

## 2022-05-12 NOTE — SIGNIFICANT NOTE
05/12/22 1057   OTHER   Discipline physical therapist   Rehab Time/Intention   Session Not Performed unable to evaluate, medical status change  (pt tsf to ICU, sedated due to ETOH w/d, not appropriate for PT. will check status tomorrow)   Recommendation   PT - Next Appointment 05/13/22

## 2022-05-12 NOTE — PAYOR COMM NOTE
"CONTINUED STAY REVIEW  REF #[id number]  F:  [phone]      Bogdan Pearson (46 y.o. Male)             Date of Birth   1975    Social Security Number       Address   [address] [place] [address]    Home Phone   [phone]    MRN   [id number]       Synagogue   None    Marital Status   Single                            Admission Date   5/9/22    Admission Type   Emergency    Admitting Provider   Cony Damian MD    Attending Provider   Jorge Payton MD    Department, Room/Bed   [facility] INTENSIVE CARE, I376/1       Discharge Date       Discharge Disposition       Discharge Destination                               Attending Provider: Jorge Payton MD    Allergies: No Known Allergies    Isolation: None   Infection: None   Code Status: CPR   Advance Care Planning Activity    Ht: 180.3 cm (71\")   Wt: 71.5 kg (157 lb 10.1 oz)    Admission Cmt: None   Principal Problem: Intractable back pain [M54.9]                 Active Insurance as of 5/9/2022     Primary Coverage     Payor Plan Insurance Group Employer/Plan Group    [facility] BY VALERI [facility] BY VALERI [id number]     Payor Plan Address Payor Plan Phone Number Payor Plan Fax Number Effective Dates    [address]   1/1/2021 - None Entered    Psychiatric [id number]       Subscriber Name Subscriber Birth Date Member ID       BOGDAN PEARSON 1975 [id number]                 Emergency Contacts      (Rel.) Home Phone Work Phone Mobile Phone    KRYSTAL WEBSTER (Father) [phone] -- [phone]            Vital Signs (last day)     Date/Time Temp Temp src Pulse Resp BP Patient Position SpO2    05/12/22 1300 -- -- 121 -- 155/110 -- 99    05/12/22 1200 -- -- 128 -- 156/102 -- 100    05/12/22 1115 98.4 (36.9) Axillary -- -- -- -- --    05/12/22 1100 -- -- 120 -- 152/114 -- 95    05/12/22 1000 -- -- 118 -- 151/109 -- 94    05/12/22 0900 -- -- 138 -- 146/103 -- 97    05/12/22 0800 -- -- 125 -- 157/111 " -- 100    05/12/22 0722 -- -- 128 -- -- -- --    05/12/22 0700 -- -- 124 -- 149/100 -- 97    05/12/22 0600 -- -- 124 -- 146/100 -- 96    05/12/22 0500 -- -- 125 -- 135/95 -- 94    05/12/22 0400 -- -- 140 20 149/107 -- 92    05/12/22 0329 98.3 (36.8) Axillary -- -- -- -- --    05/12/22 0300 -- -- 128 -- 129/100 -- 87    05/12/22 0200 -- -- 129 -- 136/99 -- 92    05/12/22 0100 -- -- 111 -- 143/105 -- 80    05/12/22 0000 -- -- 109 18 148/102 -- 87    05/11/22 2343 97.7 (36.5) Axillary -- -- -- -- --    05/11/22 2200 -- -- 108 -- 145/104 -- 92    05/11/22 2100 -- -- 117 -- 116/96 -- 95    05/11/22 2000 98.1 (36.7) Axillary 126 20 158/107 Lying 94    05/11/22 1900 97.8 (36.6) -- 133 -- 140/97 -- 92    05/11/22 1800 -- -- 131 -- 151/108 -- 100    05/11/22 1700 -- -- 128 -- 149/99 -- 97    05/11/22 1600 -- -- 135 -- 148/102 -- 97    05/11/22 1546 99.6 (37.6) Oral 137 20 132/95 Lying 98    05/11/22 1316 99.4 (37.4) Oral 143 18 158/111 Lying 92    05/11/22 1219 -- -- -- -- 146/103 Lying --    05/11/22 1115 -- -- 154 -- 144/106 -- --    05/11/22 0918 97.9 (36.6) Oral -- 18 130/105 Lying 96    05/11/22 0400 98.1 (36.7) Oral 119 18 124/97 Lying 97    05/11/22 0200 -- -- -- -- -- -- 98    05/11/22 0000 98.2 (36.8) Oral 120 18 125/88 Lying 97          Oxygen Therapy (last day)     Date/Time SpO2 Device (Oxygen Therapy) Flow (L/min) Oxygen Concentration (%) ETCO2 (mmHg)    05/12/22 1300 99 -- -- -- --    05/12/22 1200 100 -- -- -- --    05/12/22 1100 95 -- -- -- --    05/12/22 1000 94 -- -- -- --    05/12/22 0900 97 -- -- -- --    05/12/22 0800 100 -- -- -- --    05/12/22 0719 -- Venturi mask system 2 -- --    05/12/22 0700 97 -- -- -- --    05/12/22 0600 96 Venturi mask system -- 26 --    05/12/22 0500 94 -- -- -- --    05/12/22 0400 92 Venturi mask system -- 26 --    05/12/22 0300 87 -- -- -- --    05/12/22 0200 92 -- -- -- --    05/12/22 0100 80 -- -- -- --    05/12/22 0030 -- Venturi mask system -- 26 --    05/12/22 0000 87  nasal cannula 2 -- --    05/11/22 2200 92 nasal cannula 2 -- --    05/11/22 2100 95 -- -- -- --    05/11/22 2000 94 nasal cannula 2 -- --    05/11/22 1945 -- nasal cannula 2 -- --    05/11/22 1900 92 -- -- -- --    05/11/22 1800 100 -- -- -- --    05/11/22 1700 97 -- -- -- --    05/11/22 1600 97 -- -- -- --    05/11/22 1546 98 -- -- -- --    05/11/22 1543 -- -- 2 -- --    05/11/22 1316 92 room air -- -- --    05/11/22 0918 96 nasal cannula -- -- --    05/11/22 0818 -- nasal cannula 2 -- --    05/11/22 0400 97 nasal cannula 2 -- --    05/11/22 0200 98 nasal cannula 2 -- --    05/11/22 0000 97 nasal cannula 2 -- --          Lines, Drains & Airways     Active LDAs     Name Placement date Placement time Site Days    Peripheral IV 05/11/22 1055 Anterior;Proximal;Right Forearm 05/11/22  1055  Forearm  1    Peripheral IV 05/11/22 1821 Anterior;Left Forearm 05/11/22  1821  Forearm  less than 1                CIWA (since admission)     Date/Time CIWA-Ar Score    05/12/22 1400 20    05/12/22 1336 18    05/12/22 1321 18    05/12/22 1200 6    05/12/22 0719 6    05/12/22 0400 2    05/12/22 0000 2    05/11/22 2344 16    05/11/22 2005 2    05/11/22 1945 28    05/11/22 1940 22    05/11/22 1900 21    05/11/22 1820 26    05/11/22 1800 27    05/11/22 1738 25    05/11/22 1720 27    05/11/22 1655 28    05/11/22 1558 27    05/11/22 1543 28    05/11/22 1415 7    05/11/22 1356 22    05/11/22 1308 22    05/11/22 1220 24    05/11/22 1155 --    05/11/22 1138 18    05/11/22 1111 18    05/11/22 1049 16    05/11/22 1020 16    05/11/22 0800 10    05/11/22 0349 5    05/11/22 0300 20    05/11/22 0100 10    05/11/22 0041 8    05/11/22 0000 25    05/10/22 2201 13    05/10/22 2128 32    05/10/22 2053 32    05/10/22 2029 35    05/10/22 2000 28    05/10/22 1914 13    05/10/22 1751 15    05/10/22 1600 8    05/10/22 1200 6    05/10/22 0811 3    05/10/22 0400 2    05/10/22 0000 2    05/09/22 2000 5    05/09/22 1813 3          Medication Administration  Report for Bogdan Jeter as of 05/12/22 1416   Legend:    Given Hold Not Given Due Canceled Entry Other Actions    Time Time (Time) Time  Time-Action       Discontinued     Completed     Future     MAR Hold     Linked           Medications 05/12/22    famotidine (PEPCID) injection 20 mg  Dose: 20 mg  Freq: Every 12 Hours Scheduled Route: IV  Start: 05/09/22 2100   Admin Instructions:   Dilute to 10 mL total volume and give IV push over 2 minutes.    0822-Given     2100                   folic acid 1 mg in sodium chloride 0.9 % 50 mL IVPB  Dose: 1 mg  Freq: Daily Route: IV  Start: 05/12/22 0900   Admin Instructions:   Protect from light.    0953-New Bag                    lidocaine (LIDODERM) 5 % 2 patch  Dose: 2 patch  Freq: Every 24 Hours Scheduled Route: TD  Start: 05/10/22 1415   Admin Instructions:   Apply to skin on low back . Remove patch in 12 hours. Apply patch only once for up to 12 hours in 24 hour period.  If given for pain, use the following pain scale:  Mild Pain = Pain Score of 1-3, CPOT 1-2  Moderate Pain = Pain Score of 4-6, CPOT 3-4  Severe Pain = Pain Score of 7-10, CPOT 5-8    (0818)-Not Given                    LORazepam (ATIVAN) injection 1 mg  Dose: 1 mg  Freq: Every 8 Hours Route: IV  Start: 05/12/22 0900   End: 05/19/22 0859   Admin Instructions:    Caution: Look alike/sound alike drug alert. Dilute 1:1 with normal saline.    0823-Given     1700                   nicotine (NICODERM CQ) 21 MG/24HR patch 1 patch  Dose: 1 patch  Freq: Every 24 Hours Scheduled Route: TD  Start: 05/10/22 0900   Admin Instructions:   Apply to clean, dry, nonhairy area of skin (typically upper arm or shoulder)   Acutely Hazardous. Waste BOTH Residual Medication and/or Empty Package.    0715-Medication Removed     0817-Medication Applied                   sodium chloride 0.9 % flush 10 mL  Dose: 10 mL  Freq: Every 12 Hours Scheduled Route: IV  Start: 05/09/22 2100    0818-Given     2100                   thiamine  (B-1) 200 mg in sodium chloride 0.9 % 100 mL IVPB  Dose: 200 mg  Freq: Daily Route: IV  Start: 05/12/22 0900   Admin Instructions:   Protect from light.    0953-New Bag                   Completed Medications  Medications 05/12/22       chlordiazePOXIDE (LIBRIUM) capsule 50 mg  Dose: 50 mg  Freq: Once Route: PO  Start: 05/11/22 1345   End: 05/11/22 1307   Admin Instructions:    Caution: Look alike/sound alike drug alert.        cyclobenzaprine (FLEXERIL) tablet 10 mg  Dose: 10 mg  Freq: Once Route: PO  Start: 05/09/22 1202   End: 05/09/22 1215        gadobenate dimeglumine (MULTIHANCE) injection 15 mL  Dose: 15 mL  Freq: Once in Imaging Route: IV  Start: 05/10/22 1215   End: 05/10/22 1133   Admin Instructions:   Vesicant; admin as rapid bolus; flush with 5 mL NS after admin or 20 mL for renal or aortoiliofemoral vasculature        ketorolac (TORADOL) injection 30 mg  Dose: 30 mg  Freq: Once Route: IM  Start: 05/09/22 1202   End: 05/09/22 1216   Admin Instructions:       If given for pain, use the following pain scale:  Mild Pain = Pain Score of 1-3, CPOT 1-2  Moderate Pain = Pain Score of 4-6, CPOT 3-4  Severe Pain = Pain Score of 7-10, CPOT 5-8        LORazepam (ATIVAN) injection 4 mg  Dose: 4 mg  Freq: Once Route: IV  Start: 05/11/22 1445   End: 05/11/22 1440   Admin Instructions:    Caution: Look alike/sound alike drug alert. Dilute 1:1 with normal saline.        LORazepam (ATIVAN) tablet 1 mg  Dose: 1 mg  Freq: Every 6 Hours Route: PO  Start: 05/09/22 2100   End: 05/10/22 1424   Admin Instructions:    Caution: Look alike/sound alike drug alert       Followed by  LORazepam (ATIVAN) tablet 1 mg  Dose: 1 mg  Freq: Every 8 Hours Route: PO  Start: 05/10/22 2100   End: 05/11/22 2059   Admin Instructions:    Caution: Look alike/sound alike drug alert       Discontinued Medications  Medications 05/12/22       chlordiazePOXIDE (LIBRIUM) capsule 10 mg  Dose: 10 mg  Freq: Every 8 Hours Scheduled Route: PO  Start: 05/10/22  1730   End: 05/10/22 1718   Admin Instructions:   Hold if sedated   Caution: Look alike/sound alike drug alert.        chlordiazePOXIDE (LIBRIUM) capsule 25 mg  Dose: 25 mg  Freq: Every 6 Hours Scheduled Route: PO  Start: 05/10/22 1815   End: 05/11/22 1356   Admin Instructions:   Hold if sedated   Caution: Look alike/sound alike drug alert.        cyclobenzaprine (FLEXERIL) tablet 10 mg  Dose: 10 mg  Freq: Every 8 Hours Scheduled Route: PO  Start: 05/09/22 2200   End: 05/11/22 1421        diazePAM (VALIUM) injection 10 mg  Dose: 10 mg  Freq: Once Route: IV  Start: 05/11/22 1445   End: 05/12/22 0934   Admin Instructions:    May give each 5 mg IV push over 1 minute. May be injected through infusion tubing using port closest to vein insertion. Do not mix or dilute with other solutions.        thiamine (VITAMIN B-1) tablet 100 mg  Dose: 100 mg  Freq: Daily Route: PO  Start: 05/10/22 0900   End: 05/12/22 0810             ,   Medication Administration Report for Bogdan Jeter as of 05/12/22 1416   Legend:    Given Hold Not Given Due Canceled Entry Other Actions    Time Time (Time) Time  Time-Action       Discontinued     Completed     Future     MAR Hold     Linked           Medications 05/12/22    dextrose 5 % and sodium chloride 0.9 % with KCl 20 mEq/L infusion  Rate: 100 mL/hr Dose: 100 mL/hr  Freq: Continuous Route: IV  Start: 05/12/22 0900    0953-New Bag                   Discontinued Medications  Medications 05/12/22       sodium chloride 0.9 % with KCl 20 mEq/L infusion  Rate: 100 mL/hr Dose: 100 mL/hr  Freq: Continuous Route: IV  Start: 05/09/22 2015   End: 05/12/22 0810    0547-New Bag                          and   Medication Administration Report for Bogdan Jeter as of 05/12/22 1416   Legend:    Given Hold Not Given Due Canceled Entry Other Actions    Time Time (Time) Time  Time-Action       Discontinued     Completed     Future     MAR Hold     Linked           Medications 05/12/22    acetaminophen  (TYLENOL) tablet 650 mg  Dose: 650 mg  Freq: Every 4 Hours PRN Route: PO  PRN Reason: Mild Pain   Start: 05/09/22 1923   Admin Instructions:   Do not exceed 4 grams of acetaminophen in a 24 hr period. Max dose of 2gm for AST/ALT greater than 120 units/L    If given for fever, use fever parameter: fever greater than 100.4 °F.    If given for pain, use the following pain scale:   Mild Pain = Pain Score of 1-3, CPOT 1-2  Moderate Pain = Pain Score of 4-6, CPOT 3-4  Severe Pain = Pain Score of 7-10, CPOT 5-8       Or  acetaminophen (TYLENOL) 160 MG/5ML solution 650 mg  Dose: 650 mg  Freq: Every 4 Hours PRN Route: PO  PRN Reason: Mild Pain   Start: 05/09/22 1923   Admin Instructions:   Do not exceed 4 grams of acetaminophen in a 24 hr period. Max dose of 2gm for AST/ALT greater than 120 units/L    If given for fever, use fever parameter: fever greater than 100.4 °F.    If given for pain, use the following pain scale:   Mild Pain = Pain Score of 1-3, CPOT 1-2  Moderate Pain = Pain Score of 4-6, CPOT 3-4  Severe Pain = Pain Score of 7-10, CPOT 5-8       Or  acetaminophen (TYLENOL) suppository 650 mg  Dose: 650 mg  Freq: Every 4 Hours PRN Route: RE  PRN Reason: Mild Pain   Start: 05/09/22 1923   Admin Instructions:   Do not exceed 4 grams of acetaminophen in a 24 hr period. Max dose of 2gm for AST/ALT greater than 120 units/L    If given for fever, use fever parameter: fever greater than 100.4 °F.    If given for pain, use the following pain scale:   Mild Pain = Pain Score of 1-3, CPOT 1-2  Moderate Pain = Pain Score of 4-6, CPOT 3-4  Severe Pain = Pain Score of 7-10, CPOT 5-8        aluminum-magnesium hydroxide-simethicone (MAALOX MAX) 400-400-40 MG/5ML suspension 15 mL  Dose: 15 mL  Freq: Every 4 Hours PRN Route: PO  PRN Reasons: Indigestion,Heartburn  Start: 05/09/22 2003   Admin Instructions:   Maximum 60 mL in 24 hours.        dextrose (D50W) (25 g/50 mL) IV injection 25 g  Dose: 25 g  Freq: Every 15 Minutes PRN Route:  IV  PRN Reason: Low Blood Sugar  PRN Comment: Blood Sugar Less Than 70, Patient Has IV Access - Unresponsive, NPO or Unable To Safely Swallow  Start: 05/11/22 1640    0547-Given     1120-Given                   dextrose (GLUTOSE) oral gel 15 g  Dose: 15 g  Freq: Every 15 Minutes PRN Route: PO  PRN Reason: Low Blood Sugar  PRN Comment: Blood Sugar Less Than 70, Patient Alert, Is Not NPO & Can Safely Swallow  Start: 05/11/22 1640        glucagon (human recombinant) (GLUCAGEN DIAGNOSTIC) injection 1 mg  Dose: 1 mg  Freq: Every 15 Minutes PRN Route: SC  PRN Reason: Low Blood Sugar  PRN Comment: Blood Glucose Less Than 70 - Patient Without IV Access - Unresponsive, NPO or Unable To Safely Swallow  Start: 05/11/22 1640        HYDROcodone-acetaminophen (NORCO) 7.5-325 MG per tablet 1 tablet  Dose: 1 tablet  Freq: Every 4 Hours PRN Route: PO  PRN Reason: Moderate Pain   Start: 05/09/22 1923   End: 05/16/22 1922   Admin Instructions:   [JJ]    Do not exceed 4 grams of acetaminophen in a 24 hr period. Max dose of 2gm for AST/ALT greater than 120 units/L        If given for pain, use the following pain scale:   Mild Pain = Pain Score of 1-3, CPOT 1-2  Moderate Pain = Pain Score of 4-6, CPOT 3-4  Severe Pain = Pain Score of 7-10, CPOT 5-8        HYDROmorphone (DILAUDID) injection 0.5 mg  Dose: 0.5 mg  Freq: Every 2 Hours PRN Route: IV  PRN Reason: Severe Pain   Start: 05/09/22 1923   End: 05/16/22 1922   Admin Instructions:   If given for pain, use the following pain scale:  Mild Pain = Pain Score of 1-3, CPOT 1-2  Moderate Pain = Pain Score of 4-6, CPOT 3-4  Severe Pain = Pain Score of 7-10, CPOT 5-8       And  naloxone (NARCAN) injection 0.4 mg  Dose: 0.4 mg  Freq: Every 5 Minutes PRN Route: IV  PRN Reason: Respiratory Depression  Start: 05/09/22 1923   Admin Instructions:   If Respiratory Rate Less Than 8 or Patient is Difficult to Arouse, Stop ALL Narcotics & Contact Provider.  Administer Slow IV Push.  Repeat As Ordered  Until Respiratory Rate is Greater Than 12.        LORazepam (ATIVAN) tablet 1 mg  Dose: 1 mg  Freq: Every 2 Hours PRN Route: PO  PRN Reason: Withdrawal  PRN Comment: For CIWA-Ar 8-10  Start: 05/09/22 2004   End: 05/16/22 2003   Admin Instructions:   Reassess 2 Hours After Administration   Caution: Look alike/sound alike drug alert    1320-Not Given:  See Alt     1336-Not Given:  See Alt     1400-Not Given:  See Alt                 Or  LORazepam (ATIVAN) injection 1 mg  Dose: 1 mg  Freq: Every 2 Hours PRN Route: IV  PRN Reason: Withdrawal  PRN Comment: For CIWA-Ar 8-10  Start: 05/09/22 2004   End: 05/16/22 2003   Admin Instructions:   Reassess 2 Hours After Administration   Caution: Look alike/sound alike drug alert. Dilute 1:1 with normal saline.    1320-Not Given:  See Alt     1336-Not Given:  See Alt     1400-Not Given:  See Alt                 Or  LORazepam (ATIVAN) tablet 2 mg  Dose: 2 mg  Freq: Every 1 Hour PRN Route: PO  PRN Reason: Withdrawal  PRN Comment: For CIWA-Ar 11-15  Start: 05/09/22 2004   End: 05/16/22 2003   Admin Instructions:   Reassess 1 Hour After Administration   Caution: Look alike/sound alike drug alert    1320-Not Given:  See Alt     1336-Not Given:  See Alt     1400-Not Given:  See Alt                 Or  LORazepam (ATIVAN) injection 2 mg  Dose: 2 mg  Freq: Every 1 Hour PRN Route: IV  PRN Reason: Withdrawal  PRN Comment: For CIWA-Ar 11-15  Start: 05/09/22 2004   End: 05/16/22 2003   Admin Instructions:   Reassess 1 Hour After Administration   Caution: Look alike/sound alike drug alert. Dilute 1:1 with normal saline.    1320-Not Given:  See Alt     1336-Not Given:  See Alt     1400-Not Given:  See Alt                 Or  LORazepam (ATIVAN) injection 2 mg  Dose: 2 mg  Freq: Every 15 Minutes PRN Route: IV  PRN Reason: Anxiety  PRN Comment: For CIWA-Ar Greater Than 15.  Repeat Dose in 15 Minutes if CIWA-Ar Does Not Decrease  Start: 05/09/22 2004   End: 05/16/22 2003   Admin Instructions:    Reassess 15 Minutes After Each Administration.  If CIWA-Ar Does Not Decrease Contact Provider To Discuss Transfer to Higher Level of Care.   Caution: Look alike/sound alike drug alert. Dilute 1:1 with normal saline.    1320-Given     1336-Given     1400-Given                 Or  LORazepam (ATIVAN) injection 2 mg  Dose: 2 mg  Freq: Every 15 Minutes PRN Route: IM  PRN Reason: Withdrawal  PRN Comment: If Unable to Administer IV - For CIWA-Ar Greater Than 15.  Repeat Dose in 15 Minutes if CIWA-Ar Does Not Decrease  Start: 05/09/22 2004   End: 05/16/22 2003   Admin Instructions:   Reassess 15 Minutes After Each Administration.  If CIWA-Ar Does Not Decrease Contact Provider To Discuss Transfer to Higher Level of Care.   Caution: Look alike/sound alike drug alert. Dilute 1:1 with normal saline.    1320-Not Given:  See Alt     1336-Not Given:  See Alt     1400-Not Given:  See Alt                  ondansetron (ZOFRAN) injection 4 mg  Dose: 4 mg  Freq: Every 6 Hours PRN Route: IV  PRN Reasons: Nausea,Vomiting  Start: 05/09/22 1923   Admin Instructions:   If BOTH ondansetron (ZOFRAN) and promethazine (PHENERGAN) are ordered use ondansetron first and THEN promethazine IF ondansetron is ineffective.        sodium chloride 0.9 % flush 10 mL  Dose: 10 mL  Freq: As Needed Route: IV  PRN Reason: Line Care  Start: 05/09/22 1922        sodium chloride 0.9 % flush 10 mL  Dose: 10 mL  Freq: As Needed Route: IV  PRN Reason: Line Care  Start: 05/09/22 1406                      Physician Progress Notes      Jose Alberto Ferguson MD at 05/12/22 0929          Dr. DISHA Ferguson    [facility] INTENSIVE CARE    5/12/2022    Patient ID:  Name:  Bogdan Jeter  MRN:  [id number]  1975  46 y.o.  male            CC/Reason for visit: Acute alcohol withdrawal    Interval hx: Patient is quite sedated.  Has received more than 30 mg of Ativan over the past 18 hours.  He is still protecting his airway.  He is requiring some supplemental  oxygen.  He remains in soft restraints.    ROS: Unobtainable, patient sedated    Vitals:  Vitals:    05/12/22 0600 05/12/22 0700 05/12/22 0722 05/12/22 0800   BP: 146/100 149/100  (!) 157/111   Pulse: (!) 124 (!) 124 (!) 128 (!) 125   Resp:       Temp:       TempSrc:       SpO2: 96% 97%  100%   Weight:       Height:               Body mass index is 21.98 kg/m².    Intake/Output Summary (Last 24 hours) at 5/12/2022 0930  Last data filed at 5/12/2022 0400  Gross per 24 hour   Intake 965 ml   Output 750 ml   Net 215 ml       Exam:  GEN:  Patient is sedated and calm  Opens eyes briefly to painful sternal rub but does not follow commands and does not answer my questions  LUNGS: Clear breath sounds bilat, no use of accessory muscles  CV:  Normal S1S2, without murmur, no edema  ABD:  Non tender, no enlarged liver or masses      Scheduled meds:  diazePAM, 10 mg, Intravenous, Once  famotidine, 20 mg, Intravenous, Q12H  folic acid (FOLVITE) IVPB, 1 mg, Intravenous, Daily  lidocaine, 2 patch, Transdermal, Q24H  LORazepam, 1 mg, Intravenous, Q8H  nicotine, 1 patch, Transdermal, Q24H  sodium chloride, 10 mL, Intravenous, Q12H  thiamine (VITAMIN B1) IVPB, 200 mg, Intravenous, Daily      IV meds:                      dextrose 5 % and sodium chloride 0.9 % with KCl 20 mEq, 100 mL/hr        Data Review:   I reviewed the patient's medications and new clinical results.    COVID19   Date Value Ref Range Status   05/10/2022 Not Detected Not Detected - Ref. Range Final         Lab Results   Component Value Date    CALCIUM 8.9 05/12/2022    PHOS 3.6 05/12/2022    MG 1.8 05/12/2022     Results from last 7 days   Lab Units 05/12/22  0322 05/11/22  0559 05/10/22  0459   SODIUM mmol/L 134* 134* 138   POTASSIUM mmol/L 4.1 4.1 3.8   CHLORIDE mmol/L 99 97* 101   CO2 mmol/L 21.3* 23.0 25.2   BUN mg/dL 7 6 7   CREATININE mg/dL 0.67* 0.56* 0.78   CALCIUM mg/dL 8.9 8.6 8.2*   BILIRUBIN mg/dL 0.9 0.9 1.3*   ALK PHOS U/L 122* 119* 126*   ALT (SGPT)  U/L 29 33 44*   AST (SGOT) U/L 42* 50* 69*   GLUCOSE mg/dL 56* 73 83   WBC 10*3/mm3 9.17 7.28 7.36   HEMOGLOBIN g/dL 14.0 14.3 13.9   PLATELETS 10*3/mm3 203 211 215             ASSESSMENT:     Intractable back pain    Hypokalemia    Tobacco abuse    Alcohol withdrawal (HCC)    Heart burn  Hypoglycemia      PLAN:  Remains critically ill, sedated and encephalopathic due to alcohol withdrawal.  Continue with Ativan.  Start low-dose scheduled Ativan IV.  Currently oral route is not safe due to sedation.  Continue alcohol withdrawal protocol checking CIWA scores and dosing Ativan as needed.  Remain in soft restraints for now.  Monitor electrolytes closely.  Patient did have some hypoglycemia, reason for which we will place him on D5 half-normal saline.  Give IV folic acid and thiamine.  Lovenox for DVT prophylaxis.  Daily labs and monitor electrolytes.    Total critical care time 32 minutes excluding any separately billable procedure times      Jose Alberto Ferguson MD  5/12/2022    Electronically signed by Jose Alberto Ferguson MD at 05/12/22 0934           Ana Christianson PT    Physical Therapist   Specialty:  Physical Therapy   Significant Note       Signed   Date of Service:  05/12/22 1057   Creation Time:  05/12/22 1057              Signed                 05/12/22 1057   OTHER   Discipline physical therapist   Rehab Time/Intention   Session Not Performed unable to evaluate, medical status change  (pt tsf to ICU, sedated due to ETOH w/d, not appropriate for PT. will check status tomorrow)   Recommendation   PT - Next Appointment 05/13/22                      Fatimah Bull, RN    Registered Nurse   Psychiatry   Nursing Note       Signed   Date of Service:  05/12/22 0753   Creation Time:  05/12/22 0753              Signed               Spoke with primary RN regarding current status, r/t consult for ETOH. Pt.confused, and sedated. Access will follow back up when pt. appropriate to be evaluated.

## 2022-05-12 NOTE — PROGRESS NOTES
Dr. DISHA Ferguson    [facility] INTENSIVE CARE    5/12/2022    Patient ID:  Name:  Bogdan Jeter  MRN:  [id number]  1975  46 y.o.  male            CC/Reason for visit: Acute alcohol withdrawal    Interval hx: Patient is quite sedated.  Has received more than 30 mg of Ativan over the past 18 hours.  He is still protecting his airway.  He is requiring some supplemental oxygen.  He remains in soft restraints.    ROS: Unobtainable, patient sedated    Vitals:  Vitals:    05/12/22 0600 05/12/22 0700 05/12/22 0722 05/12/22 0800   BP: 146/100 149/100  (!) 157/111   Pulse: (!) 124 (!) 124 (!) 128 (!) 125   Resp:       Temp:       TempSrc:       SpO2: 96% 97%  100%   Weight:       Height:               Body mass index is 21.98 kg/m².    Intake/Output Summary (Last 24 hours) at 5/12/2022 0930  Last data filed at 5/12/2022 0400  Gross per 24 hour   Intake 965 ml   Output 750 ml   Net 215 ml       Exam:  GEN:  Patient is sedated and calm  Opens eyes briefly to painful sternal rub but does not follow commands and does not answer my questions  LUNGS: Clear breath sounds bilat, no use of accessory muscles  CV:  Normal S1S2, without murmur, no edema  ABD:  Non tender, no enlarged liver or masses      Scheduled meds:  diazePAM, 10 mg, Intravenous, Once  famotidine, 20 mg, Intravenous, Q12H  folic acid (FOLVITE) IVPB, 1 mg, Intravenous, Daily  lidocaine, 2 patch, Transdermal, Q24H  LORazepam, 1 mg, Intravenous, Q8H  nicotine, 1 patch, Transdermal, Q24H  sodium chloride, 10 mL, Intravenous, Q12H  thiamine (VITAMIN B1) IVPB, 200 mg, Intravenous, Daily      IV meds:                      dextrose 5 % and sodium chloride 0.9 % with KCl 20 mEq, 100 mL/hr        Data Review:   I reviewed the patient's medications and new clinical results.    COVID19   Date Value Ref Range Status   05/10/2022 Not Detected Not Detected - Ref. Range Final         Lab Results   Component Value Date    CALCIUM 8.9 05/12/2022    PHOS 3.6 05/12/2022     MG 1.8 05/12/2022     Results from last 7 days   Lab Units 05/12/22  0322 05/11/22  0559 05/10/22  0459   SODIUM mmol/L 134* 134* 138   POTASSIUM mmol/L 4.1 4.1 3.8   CHLORIDE mmol/L 99 97* 101   CO2 mmol/L 21.3* 23.0 25.2   BUN mg/dL 7 6 7   CREATININE mg/dL 0.67* 0.56* 0.78   CALCIUM mg/dL 8.9 8.6 8.2*   BILIRUBIN mg/dL 0.9 0.9 1.3*   ALK PHOS U/L 122* 119* 126*   ALT (SGPT) U/L 29 33 44*   AST (SGOT) U/L 42* 50* 69*   GLUCOSE mg/dL 56* 73 83   WBC 10*3/mm3 9.17 7.28 7.36   HEMOGLOBIN g/dL 14.0 14.3 13.9   PLATELETS 10*3/mm3 203 211 215             ASSESSMENT:     Intractable back pain    Hypokalemia    Tobacco abuse    Alcohol withdrawal (HCC)    Heart burn  Hypoglycemia      PLAN:  Remains critically ill, sedated and encephalopathic due to alcohol withdrawal.  Continue with Ativan.  Start low-dose scheduled Ativan IV.  Currently oral route is not safe due to sedation.  Continue alcohol withdrawal protocol checking CIWA scores and dosing Ativan as needed.  Remain in soft restraints for now.  Monitor electrolytes closely.  Patient did have some hypoglycemia, reason for which we will place him on D5 half-normal saline.  Give IV folic acid and thiamine.  Lovenox for DVT prophylaxis.  Daily labs and monitor electrolytes.    Total critical care time 32 minutes excluding any separately billable procedure times      Jose Alberto Ferguson MD  5/12/2022

## 2022-05-12 NOTE — NURSING NOTE
Spoke with primary RN regarding current status, r/t consult for ETOH. Pt.confused, and sedated. Access will follow back up when pt. appropriate to be evaluated.

## 2022-05-12 NOTE — PROGRESS NOTES
Name: Bogdan Jeter ADMIT: 2022   : 1975  PCP: Provider, No Known    MRN: [id number] LOS: 3 days   AGE/SEX: 46 y.o. male  ROOM: [facility]     Subjective   Subjective   Seen in ICU. Bilateral upper restraints in place. Nurse states his CIWA was doing good in 2-7 range and now in low to mid 20s. Patient is answering appropriately but clearly still in withdrawal. Denies any back pain at rest. No nausea or vomiting.      Objective   Objective   Vital Signs  Temp:  [97.7 °F (36.5 °C)-99.6 °F (37.6 °C)] 98.4 °F (36.9 °C)  Heart Rate:  [108-140] 121  Resp:  [18-20] 20  BP: (116-158)/() 155/110  SpO2:  [80 %-100 %] 99 %  on  Flow (L/min):  [2] 2;   Device (Oxygen Therapy): Venturi mask system  Body mass index is 21.98 kg/m².     Physical Exam  Vitals and nursing note reviewed.   Constitutional:       Appearance: He is ill-appearing. He is not toxic-appearing.   HENT:      Head: Normocephalic and atraumatic.   Cardiovascular:      Rate and Rhythm: Normal rate and regular rhythm.      Pulses: Normal pulses.      Heart sounds: Normal heart sounds.   Pulmonary:      Effort: Pulmonary effort is normal. No respiratory distress.      Comments: Diminished on RA  Abdominal:      General: Bowel sounds are normal. There is no distension.      Palpations: Abdomen is soft.      Tenderness: There is no abdominal tenderness.   Musculoskeletal:         General: Tenderness present. No swelling.      Cervical back: Normal range of motion and neck supple.      Comments: mild weakness in left leg  Skin:     General: Skin is warm and dry.      Findings: No bruising.      Comments: Multiple tattoos   Neurological:      Mental Status: He is alert and oriented to person, place, and time.      Sensory: Sensory deficit present.      Motor: Weakness present.      Coordination: Coordination normal.      Comments: Mild tremors on exam/ slightly restless  Psychiatric:         Behavior: Behavior normal.      Comments: Slightly  Urinary Tract Infection, Adult    A urinary tract infection (UTI) is an infection of any part of the urinary tract. The urinary tract includes the kidneys, ureters, bladder, and urethra. These organs make, store, and get rid of urine in the body.  Your health care provider may use other names to describe the infection. An upper UTI affects the ureters and kidneys (pyelonephritis). A lower UTI affects the bladder (cystitis) and urethra (urethritis).  What are the causes?  Most urinary tract infections are caused by bacteria in your genital area, around the entrance to your urinary tract (urethra). These bacteria grow and cause inflammation of your urinary tract.  What increases the risk?  You are more likely to develop this condition if:  · You have a urinary catheter that stays in place (indwelling).  · You are not able to control when you urinate or have a bowel movement (you have incontinence).  · You are female and you:  ? Use a spermicide or diaphragm for birth control.  ? Have low estrogen levels.  ? Are pregnant.  · You have certain genes that increase your risk (genetics).  · You are sexually active.  · You take antibiotic medicines.  · You have a condition that causes your flow of urine to slow down, such as:  ? An enlarged prostate, if you are male.  ? Blockage in your urethra (stricture).  ? A kidney stone.  ? A nerve condition that affects your bladder control (neurogenic bladder).  ? Not getting enough to drink, or not urinating often.  · You have certain medical conditions, such as:  ? Diabetes.  ? A weak disease-fighting system (immunesystem).  ? Sickle cell disease.  ? Gout.  ? Spinal cord injury.  What are the signs or symptoms?  Symptoms of this condition include:  · Needing to urinate right away (urgently).  · Frequent urination or passing small amounts of urine frequently.  · Pain or burning with urination.  · Blood in the urine.  · Urine that smells bad or unusual.  · Trouble urinating.  · Cloudy  irritable, but cooperative.     Results Review:       I reviewed the patient's new clinical results.  Results from last 7 days   Lab Units 05/12/22  0322 05/11/22  0559 05/10/22  0459 05/09/22  1414   WBC 10*3/mm3 9.17 7.28 7.36 8.08   HEMOGLOBIN g/dL 14.0 14.3 13.9 14.8   PLATELETS 10*3/mm3 203 211 215 241     Results from last 7 days   Lab Units 05/12/22  0322 05/11/22  0559 05/10/22  0459 05/09/22  1414   SODIUM mmol/L 134* 134* 138 138   POTASSIUM mmol/L 4.1 4.1 3.8 3.2*   CHLORIDE mmol/L 99 97* 101 101   CO2 mmol/L 21.3* 23.0 25.2 22.7   BUN mg/dL 7 6 7 3*   CREATININE mg/dL 0.67* 0.56* 0.78 0.62*   GLUCOSE mg/dL 56* 73 83 80   Estimated Creatinine Clearance: 137.8 mL/min (A) (by C-G formula based on SCr of 0.67 mg/dL (L)).  Results from last 7 days   Lab Units 05/12/22  0322 05/11/22  0559 05/10/22  0459 05/09/22  1414   ALBUMIN g/dL 3.40* 3.30* 3.20* 3.50   BILIRUBIN mg/dL 0.9 0.9 1.3* 0.7   ALK PHOS U/L 122* 119* 126* 117   AST (SGOT) U/L 42* 50* 69* 99*   ALT (SGPT) U/L 29 33 44* 47*     Results from last 7 days   Lab Units 05/12/22  0322 05/11/22  0559 05/10/22  0459 05/09/22  1414   CALCIUM mg/dL 8.9 8.6 8.2* 8.2*   ALBUMIN g/dL 3.40* 3.30* 3.20* 3.50   MAGNESIUM mg/dL 1.8  --   --   --    PHOSPHORUS mg/dL 3.6  --   --   --        Glucose   Date/Time Value Ref Range Status   05/12/2022 1114 68 (L) 70 - 130 mg/dL Final     Comment:     Meter: [id number] : [id number] Essex-Sha Jillian KELVIN   05/12/2022 0741 100 70 - 130 mg/dL Final     Comment:     Meter: [id number] : [id number] Essex-Sha Jillian KELVIN   05/12/2022 0617 149 (H) 70 - 130 mg/dL Final     Comment:     Meter: [id number] : [email or web address] Haider Monroe RN   05/11/2022 1925 72 70 - 130 mg/dL Final     Comment:     Meter: [id number] : [id number] Tanya Rivera CNA   05/11/2022 1719 91 70 - 130 mg/dL Final     Comment:     Meter: [id number] : [id number] Cyndy Dawn RN   05/11/2022 1639 67 (L) 70 - 130 mg/dL Final     Comment:  urine.  · Vaginal discharge, if you are female.  · Pain in the abdomen or the lower back.  You may also have:  · Vomiting or a decreased appetite.  · Confusion.  · Irritability or tiredness.  · A fever.  · Diarrhea.  The first symptom in older adults may be confusion. In some cases, they may not have any symptoms until the infection has worsened.  How is this diagnosed?  This condition is diagnosed based on your medical history and a physical exam. You may also have other tests, including:  · Urine tests.  · Blood tests.  · Tests for sexually transmitted infections (STIs).  If you have had more than one UTI, a cystoscopy or imaging studies may be done to determine the cause of the infections.  How is this treated?  Treatment for this condition includes:  · Antibiotic medicine.  · Over-the-counter medicines to treat discomfort.  · Drinking enough water to stay hydrated.  If you have frequent infections or have other conditions such as a kidney stone, you may need to see a health care provider who specializes in the urinary tract (urologist).  In rare cases, urinary tract infections can cause sepsis. Sepsis is a life-threatening condition that occurs when the body responds to an infection. Sepsis is treated in the hospital with IV antibiotics, fluids, and other medicines.  Follow these instructions at home:    Medicines  · Take over-the-counter and prescription medicines only as told by your health care provider.  · If you were prescribed an antibiotic medicine, take it as told by your health care provider. Do not stop using the antibiotic even if you start to feel better.  General instructions  · Make sure you:  ? Empty your bladder often and completely. Do not hold urine for long periods of time.  ? Empty your bladder after sex.  ? Wipe from front to back after a bowel movement if you are female. Use each tissue one time when you wipe.  · Drink enough fluid to keep your urine pale yellow.  · Keep all follow-up      Meter: [id number] : [id number] Essex-Curtis Nicole KELVIN   05/11/2022 1355 71 70 - 130 mg/dL Final     Comment:     Meter: [id number] : [id number] Cirilo POOLE       famotidine, 20 mg, Intravenous, Q12H  folic acid (FOLVITE) IVPB, 1 mg, Intravenous, Daily  lidocaine, 2 patch, Transdermal, Q24H  LORazepam, 1 mg, Intravenous, Q8H  nicotine, 1 patch, Transdermal, Q24H  sodium chloride, 10 mL, Intravenous, Q12H  thiamine (VITAMIN B1) IVPB, 200 mg, Intravenous, Daily      dextrose 5 % and sodium chloride 0.9 % with KCl 20 mEq, 100 mL/hr, Last Rate: 100 mL/hr (05/12/22 0953)    Diet Regular       Assessment/Plan     Active Hospital Problems    Diagnosis  POA   • **Intractable back pain [M54.9]  Yes   • Hypokalemia [E87.6]  Yes   • Tobacco abuse [Z72.0]  Yes   • Alcohol withdrawal (HCC) [F10.239]  Yes   • Heart burn [R12]  Yes      Resolved Hospital Problems   No resolved problems to display.     Mr. Jeter is a 46 year old male who presented to the hospital with complaints of back pain. He has a history of daily alcohol use with 6-7 beers a day.     · Intractable back pain: MRI now returned with some DDD but no acute fracture/compression/disc herniation. L4-5 with shortened pedicles and mild central canal stenosis. Nothing surgical it seems. Orthopedic surgery was consulted- they agree with supportive care with pain meds, etc. PT evaluation ordered. Will treat symptoms with pain control and muscle relaxants. Lidoderm in place. Could consider Medrol dose pack and/or gabapentin if no improvement- however with current alcohol withdrawal will need to hold off. Steroids could worsen psychosis.  · ETOH abuse/alcohol withdrawal: Requiring large amounts of Ativan and restraints. Moved to ICU 5/11. Pulmonology help much appreciated.   · Tobacco abuse: Nicotine patch in place.  · Hypokalemia: Resolved.   · Heartburn: On scheduled acid reducer.     Note of tachycardia and elevated BP-most likely d/t withdrawal. No home  visits as told by your health care provider. This is important.  Contact a health care provider if:  · Your symptoms do not get better after 1-2 days.  · Your symptoms go away and then return.  Get help right away if you have:  · Severe pain in your back or your lower abdomen.  · A fever.  · Nausea or vomiting.  Summary  · A urinary tract infection (UTI) is an infection of any part of the urinary tract, which includes the kidneys, ureters, bladder, and urethra.  · Most urinary tract infections are caused by bacteria in your genital area, around the entrance to your urinary tract (urethra).  · Treatment for this condition often includes antibiotic medicines.  · If you were prescribed an antibiotic medicine, take it as told by your health care provider. Do not stop using the antibiotic even if you start to feel better.  · Keep all follow-up visits as told by your health care provider. This is important.  This information is not intended to replace advice given to you by your health care provider. Make sure you discuss any questions you have with your health care provider.  Document Revised: 12/05/2019 Document Reviewed: 06/27/2019  [employer] Patient Education © 2021 [employer]     medications for this.     Discussed with patient and nurse.    Appreciate pulm's help while in ICU. Will assume care again once out of the unit.     VTE Prophylaxis - SCDs  Code Status - Full code  Disposition - Anticipate discharge TBD. Need to address alcohol withdrawal first.    JESSE Fields  [facility]  05/12/22  14:47 EDT

## 2022-05-13 LAB
ALBUMIN SERPL-MCNC: 3 G/DL (ref 3.5–5.2)
ALBUMIN/GLOB SERPL: 1.1 G/DL
ALP SERPL-CCNC: 103 U/L (ref 39–117)
ALT SERPL W P-5'-P-CCNC: 23 U/L (ref 1–41)
ANION GAP SERPL CALCULATED.3IONS-SCNC: 8 MMOL/L (ref 5–15)
ANION GAP SERPL CALCULATED.3IONS-SCNC: 9 MMOL/L (ref 5–15)
AST SERPL-CCNC: 28 U/L (ref 1–40)
BASOPHILS # BLD AUTO: 0.05 10*3/MM3 (ref 0–0.2)
BASOPHILS NFR BLD AUTO: 0.7 % (ref 0–1.5)
BILIRUB SERPL-MCNC: 1 MG/DL (ref 0–1.2)
BUN SERPL-MCNC: 3 MG/DL (ref 6–20)
BUN SERPL-MCNC: 4 MG/DL (ref 6–20)
BUN/CREAT SERPL: 5.7 (ref 7–25)
BUN/CREAT SERPL: 7.3 (ref 7–25)
CALCIUM SPEC-SCNC: 8.3 MG/DL (ref 8.6–10.5)
CALCIUM SPEC-SCNC: 9 MG/DL (ref 8.6–10.5)
CHLORIDE SERPL-SCNC: 104 MMOL/L (ref 98–107)
CHLORIDE SERPL-SCNC: 105 MMOL/L (ref 98–107)
CO2 SERPL-SCNC: 21 MMOL/L (ref 22–29)
CO2 SERPL-SCNC: 22 MMOL/L (ref 22–29)
CREAT SERPL-MCNC: 0.53 MG/DL (ref 0.76–1.27)
CREAT SERPL-MCNC: 0.55 MG/DL (ref 0.76–1.27)
DEPRECATED RDW RBC AUTO: 45.9 FL (ref 37–54)
EGFRCR SERPLBLD CKD-EPI 2021: 123 ML/MIN/1.73
EGFRCR SERPLBLD CKD-EPI 2021: 124.4 ML/MIN/1.73
EOSINOPHIL # BLD AUTO: 0.13 10*3/MM3 (ref 0–0.4)
EOSINOPHIL NFR BLD AUTO: 1.8 % (ref 0.3–6.2)
ERYTHROCYTE [DISTWIDTH] IN BLOOD BY AUTOMATED COUNT: 12.5 % (ref 12.3–15.4)
GLOBULIN UR ELPH-MCNC: 2.7 GM/DL
GLUCOSE BLDC GLUCOMTR-MCNC: 177 MG/DL (ref 70–130)
GLUCOSE SERPL-MCNC: 174 MG/DL (ref 65–99)
GLUCOSE SERPL-MCNC: 365 MG/DL (ref 65–99)
HCT VFR BLD AUTO: 39.7 % (ref 37.5–51)
HGB BLD-MCNC: 13.5 G/DL (ref 13–17.7)
IMM GRANULOCYTES # BLD AUTO: 0.03 10*3/MM3 (ref 0–0.05)
IMM GRANULOCYTES NFR BLD AUTO: 0.4 % (ref 0–0.5)
LYMPHOCYTES # BLD AUTO: 1.45 10*3/MM3 (ref 0.7–3.1)
LYMPHOCYTES NFR BLD AUTO: 20.2 % (ref 19.6–45.3)
MCH RBC QN AUTO: 33.8 PG (ref 26.6–33)
MCHC RBC AUTO-ENTMCNC: 34 G/DL (ref 31.5–35.7)
MCV RBC AUTO: 99.3 FL (ref 79–97)
MONOCYTES # BLD AUTO: 0.83 10*3/MM3 (ref 0.1–0.9)
MONOCYTES NFR BLD AUTO: 11.6 % (ref 5–12)
NEUTROPHILS NFR BLD AUTO: 4.69 10*3/MM3 (ref 1.7–7)
NEUTROPHILS NFR BLD AUTO: 65.3 % (ref 42.7–76)
NRBC BLD AUTO-RTO: 0 /100 WBC (ref 0–0.2)
PHOSPHATE SERPL-MCNC: 3.7 MG/DL (ref 2.5–4.5)
PLATELET # BLD AUTO: 199 10*3/MM3 (ref 140–450)
PMV BLD AUTO: 10.1 FL (ref 6–12)
POTASSIUM SERPL-SCNC: 4.7 MMOL/L (ref 3.5–5.2)
POTASSIUM SERPL-SCNC: 5.4 MMOL/L (ref 3.5–5.2)
PROT SERPL-MCNC: 5.7 G/DL (ref 6–8.5)
RBC # BLD AUTO: 4 10*6/MM3 (ref 4.14–5.8)
SODIUM SERPL-SCNC: 134 MMOL/L (ref 136–145)
SODIUM SERPL-SCNC: 135 MMOL/L (ref 136–145)
WBC NRBC COR # BLD: 7.18 10*3/MM3 (ref 3.4–10.8)

## 2022-05-13 PROCEDURE — 25010000002 LORAZEPAM PER 2 MG: Performed by: INTERNAL MEDICINE

## 2022-05-13 PROCEDURE — 25010000002 ENOXAPARIN PER 10 MG: Performed by: INTERNAL MEDICINE

## 2022-05-13 PROCEDURE — 82962 GLUCOSE BLOOD TEST: CPT

## 2022-05-13 PROCEDURE — 80053 COMPREHEN METABOLIC PANEL: CPT | Performed by: INTERNAL MEDICINE

## 2022-05-13 PROCEDURE — 84100 ASSAY OF PHOSPHORUS: CPT | Performed by: INTERNAL MEDICINE

## 2022-05-13 PROCEDURE — 85025 COMPLETE CBC W/AUTO DIFF WBC: CPT | Performed by: INTERNAL MEDICINE

## 2022-05-13 PROCEDURE — 25010000002 THIAMINE PER 100 MG: Performed by: INTERNAL MEDICINE

## 2022-05-13 RX ORDER — ENOXAPARIN SODIUM 100 MG/ML
40 INJECTION SUBCUTANEOUS NIGHTLY
Status: DISCONTINUED | OUTPATIENT
Start: 2022-05-13 | End: 2022-05-21 | Stop reason: HOSPADM

## 2022-05-13 RX ADMIN — LORAZEPAM 2 MG: 2 INJECTION INTRAMUSCULAR; INTRAVENOUS at 18:51

## 2022-05-13 RX ADMIN — FAMOTIDINE 20 MG: 10 INJECTION INTRAVENOUS at 20:55

## 2022-05-13 RX ADMIN — ENOXAPARIN SODIUM 40 MG: 100 INJECTION SUBCUTANEOUS at 20:56

## 2022-05-13 RX ADMIN — LORAZEPAM 2 MG: 2 INJECTION INTRAMUSCULAR; INTRAVENOUS at 12:16

## 2022-05-13 RX ADMIN — DEXMEDETOMIDINE HYDROCHLORIDE 1.2 MCG/KG/HR: 4 INJECTION INTRAVENOUS at 02:41

## 2022-05-13 RX ADMIN — LORAZEPAM 2 MG: 2 INJECTION INTRAMUSCULAR; INTRAVENOUS at 13:45

## 2022-05-13 RX ADMIN — LORAZEPAM 1 MG: 2 INJECTION INTRAMUSCULAR; INTRAVENOUS at 06:34

## 2022-05-13 RX ADMIN — LORAZEPAM 2 MG: 2 INJECTION INTRAMUSCULAR; INTRAVENOUS at 16:57

## 2022-05-13 RX ADMIN — LORAZEPAM 2 MG: 2 INJECTION INTRAMUSCULAR; INTRAVENOUS at 17:27

## 2022-05-13 RX ADMIN — DEXMEDETOMIDINE HYDROCHLORIDE 1.1 MCG/KG/HR: 4 INJECTION INTRAVENOUS at 11:18

## 2022-05-13 RX ADMIN — LORAZEPAM 2 MG: 2 INJECTION INTRAMUSCULAR; INTRAVENOUS at 23:07

## 2022-05-13 RX ADMIN — THIAMINE HYDROCHLORIDE 200 MG: 100 INJECTION, SOLUTION INTRAMUSCULAR; INTRAVENOUS at 08:17

## 2022-05-13 RX ADMIN — LORAZEPAM 2 MG: 2 INJECTION INTRAMUSCULAR; INTRAVENOUS at 15:31

## 2022-05-13 RX ADMIN — LORAZEPAM 2 MG: 2 INJECTION INTRAMUSCULAR; INTRAVENOUS at 14:30

## 2022-05-13 RX ADMIN — Medication 10 ML: at 08:18

## 2022-05-13 RX ADMIN — LORAZEPAM 2 MG: 2 INJECTION INTRAMUSCULAR; INTRAVENOUS at 13:15

## 2022-05-13 RX ADMIN — LORAZEPAM 2 MG: 2 INJECTION INTRAMUSCULAR; INTRAVENOUS at 16:07

## 2022-05-13 RX ADMIN — LIDOCAINE 2 PATCH: 50 PATCH TOPICAL at 08:20

## 2022-05-13 RX ADMIN — LORAZEPAM 2 MG: 2 INJECTION INTRAMUSCULAR; INTRAVENOUS at 14:48

## 2022-05-13 RX ADMIN — DEXMEDETOMIDINE HYDROCHLORIDE 0.9 MCG/KG/HR: 4 INJECTION INTRAVENOUS at 17:38

## 2022-05-13 RX ADMIN — LORAZEPAM 1 MG: 2 INJECTION INTRAMUSCULAR; INTRAVENOUS at 08:19

## 2022-05-13 RX ADMIN — LORAZEPAM 2 MG: 2 INJECTION INTRAMUSCULAR; INTRAVENOUS at 11:00

## 2022-05-13 RX ADMIN — LORAZEPAM 2 MG: 2 INJECTION INTRAMUSCULAR; INTRAVENOUS at 22:30

## 2022-05-13 RX ADMIN — LORAZEPAM 1 MG: 2 INJECTION INTRAMUSCULAR; INTRAVENOUS at 00:52

## 2022-05-13 RX ADMIN — Medication 10 ML: at 21:00

## 2022-05-13 RX ADMIN — FAMOTIDINE 20 MG: 10 INJECTION INTRAVENOUS at 08:18

## 2022-05-13 RX ADMIN — LORAZEPAM 2 MG: 2 INJECTION INTRAMUSCULAR; INTRAVENOUS at 04:21

## 2022-05-13 RX ADMIN — LORAZEPAM 2 MG: 2 INJECTION INTRAMUSCULAR; INTRAVENOUS at 13:03

## 2022-05-13 RX ADMIN — DEXMEDETOMIDINE HYDROCHLORIDE 1.5 MCG/KG/HR: 4 INJECTION INTRAVENOUS at 07:33

## 2022-05-13 RX ADMIN — LORAZEPAM 2 MG: 2 INJECTION INTRAMUSCULAR; INTRAVENOUS at 09:41

## 2022-05-13 RX ADMIN — POTASSIUM CHLORIDE, DEXTROSE MONOHYDRATE AND SODIUM CHLORIDE 100 ML/HR: 150; 5; 900 INJECTION, SOLUTION INTRAVENOUS at 08:16

## 2022-05-13 RX ADMIN — FOLIC ACID 1 MG: 5 INJECTION, SOLUTION INTRAMUSCULAR; INTRAVENOUS; SUBCUTANEOUS at 09:45

## 2022-05-13 RX ADMIN — LORAZEPAM 2 MG: 2 INJECTION INTRAMUSCULAR; INTRAVENOUS at 20:40

## 2022-05-13 RX ADMIN — NICOTINE 1 PATCH: 21 PATCH, EXTENDED RELEASE TRANSDERMAL at 08:20

## 2022-05-13 NOTE — PROGRESS NOTES
Dr. DISHA Ferguson    [facility] INTENSIVE CARE    5/13/2022    Patient ID:  Name:  Bogdan Jeter  MRN:  [id number]  1975  46 y.o.  male            CC/Reason for visit: Acute alcohol withdrawal     Interval hx: Patient is very somnolent.  Difficult to arouse.  Currently on Precedex drip at 1.5 mics.  Overnight received 5 mg of Ativan.  Remains in restraints due to agitation and restlessness     ROS: Unobtainable, patient sedated    Vitals:  Vitals:    05/13/22 0800 05/13/22 0900 05/13/22 1000 05/13/22 1100   BP: 140/93 139/94 134/97    BP Location:       Patient Position:       Pulse: 78 79 79    Resp:       Temp:    98.2 °F (36.8 °C)   TempSrc:    Oral   SpO2: 100% 98% 99%    Weight:       Height:               Body mass index is 21.98 kg/m².    Intake/Output Summary (Last 24 hours) at 5/13/2022 1155  Last data filed at 5/13/2022 0800  Gross per 24 hour   Intake --   Output 1800 ml   Net -1800 ml       Exam:  GEN:  Somnolent, opens eyes to painful sternal rub but confused  Falls back asleep quickly.  Not following commands  LUNGS: Clear breath sounds bilat, no use of accessory muscles  CV:  Normal S1S2, without murmur, no edema  ABD:  Non tender, no enlarged liver or masses      Scheduled meds:  famotidine, 20 mg, Intravenous, Q12H  folic acid (FOLVITE) IVPB, 1 mg, Intravenous, Daily  lidocaine, 2 patch, Transdermal, Q24H  LORazepam, 1 mg, Intravenous, Q8H  nicotine, 1 patch, Transdermal, Q24H  sodium chloride, 10 mL, Intravenous, Q12H  thiamine (VITAMIN B1) IVPB, 200 mg, Intravenous, Daily      IV meds:                      dexmedetomidine, 0.2-1.5 mcg/kg/hr, Last Rate: 1.1 mcg/kg/hr (05/13/22 1118)  dextrose 5 % and sodium chloride 0.9 % with KCl 20 mEq, 100 mL/hr, Last Rate: 100 mL/hr (05/13/22 0816)        Data Review:   I reviewed the patient's medications and new clinical results.    COVID19   Date Value Ref Range Status   05/10/2022 Not Detected Not Detected - Ref. Range Final         Lab  Results   Component Value Date    CALCIUM 9.0 05/13/2022    PHOS 3.7 05/13/2022    MG 1.8 05/12/2022     Results from last 7 days   Lab Units 05/13/22  0519 05/13/22  0414 05/12/22  0322 05/11/22  0559   SODIUM mmol/L 135* 134* 134* 134*   POTASSIUM mmol/L 4.7 5.4* 4.1 4.1   CHLORIDE mmol/L 104 105 99 97*   CO2 mmol/L 22.0 21.0* 21.3* 23.0   BUN mg/dL 4* 3* 7 6   CREATININE mg/dL 0.55* 0.53* 0.67* 0.56*   CALCIUM mg/dL 9.0 8.3* 8.9 8.6   BILIRUBIN mg/dL  --  1.0 0.9 0.9   ALK PHOS U/L  --  103 122* 119*   ALT (SGPT) U/L  --  23 29 33   AST (SGOT) U/L  --  28 42* 50*   GLUCOSE mg/dL 174* 365* 56* 73   WBC 10*3/mm3  --  7.18 9.17 7.28   HEMOGLOBIN g/dL  --  13.5 14.0 14.3   PLATELETS 10*3/mm3  --  199 203 211               ASSESSMENT:    Intractable back pain    Hypokalemia    Tobacco abuse    Alcohol withdrawal (HCC)    Heart burn  Hypoglycemia  Hypoxia      PLAN:  Patient remains encephalopathic due to alcohol withdrawal syndrome.  Start weaning Precedex drip and increase Ativan dosing per CIWA protocol.  Aim for RASS scale of 0 to -2.  Now patient mildly hypoxic, requiring some oxygen via nasal cannula.  Still able to protect airway.  We will have to try to wake up the patient somewhat from this stupor which is likely induced by Precedex.  If he does not become more arousable, we will have to consider intubation for airway protection.  Remain in restraints for now.  Patient's glucose has been fluctuating significantly.  I am going to reduce his D5 half-normal infusion from 100 cc to 30 cc/h.  This may allow us to control his blood glucose better.  He did require some pushes of dextrose due to low sugar.  The patient has been receiving high-dose IV thiamine.  Give Lovenox for DVT prophylaxis.  Remains critically ill.    Total critical care time 33 minutes excluding any separately billable procedure times    Jose Alberto Ferguson MD  5/13/2022

## 2022-05-13 NOTE — SIGNIFICANT NOTE
05/13/22 1415   OTHER   Discipline physical therapist   Rehab Time/Intention   Session Not Performed other (see comments)  (RN Pedro reports pt is not appropriate for PT at this time. PT will check back Monday.)   Recommendation   PT - Next Appointment 05/16/22

## 2022-05-14 ENCOUNTER — APPOINTMENT (OUTPATIENT)
Dept: GENERAL RADIOLOGY | Facility: HOSPITAL | Age: 47
End: 2022-05-14

## 2022-05-14 LAB
ANION GAP SERPL CALCULATED.3IONS-SCNC: 13 MMOL/L (ref 5–15)
BASOPHILS # BLD AUTO: 0.06 10*3/MM3 (ref 0–0.2)
BASOPHILS NFR BLD AUTO: 0.7 % (ref 0–1.5)
BUN SERPL-MCNC: 5 MG/DL (ref 6–20)
BUN/CREAT SERPL: 6.8 (ref 7–25)
CALCIUM SPEC-SCNC: 9.4 MG/DL (ref 8.6–10.5)
CHLORIDE SERPL-SCNC: 101 MMOL/L (ref 98–107)
CO2 SERPL-SCNC: 21 MMOL/L (ref 22–29)
CREAT SERPL-MCNC: 0.74 MG/DL (ref 0.76–1.27)
DEPRECATED RDW RBC AUTO: 46.4 FL (ref 37–54)
EGFRCR SERPLBLD CKD-EPI 2021: 112.5 ML/MIN/1.73
EOSINOPHIL # BLD AUTO: 0.08 10*3/MM3 (ref 0–0.4)
EOSINOPHIL NFR BLD AUTO: 0.9 % (ref 0.3–6.2)
ERYTHROCYTE [DISTWIDTH] IN BLOOD BY AUTOMATED COUNT: 13.3 % (ref 12.3–15.4)
GLUCOSE SERPL-MCNC: 117 MG/DL (ref 65–99)
HCT VFR BLD AUTO: 42 % (ref 37.5–51)
HGB BLD-MCNC: 14.5 G/DL (ref 13–17.7)
IMM GRANULOCYTES # BLD AUTO: 0.04 10*3/MM3 (ref 0–0.05)
IMM GRANULOCYTES NFR BLD AUTO: 0.5 % (ref 0–0.5)
LYMPHOCYTES # BLD AUTO: 1.5 10*3/MM3 (ref 0.7–3.1)
LYMPHOCYTES NFR BLD AUTO: 17.6 % (ref 19.6–45.3)
MCH RBC QN AUTO: 33.3 PG (ref 26.6–33)
MCHC RBC AUTO-ENTMCNC: 34.5 G/DL (ref 31.5–35.7)
MCV RBC AUTO: 96.6 FL (ref 79–97)
MONOCYTES # BLD AUTO: 1.25 10*3/MM3 (ref 0.1–0.9)
MONOCYTES NFR BLD AUTO: 14.6 % (ref 5–12)
NEUTROPHILS NFR BLD AUTO: 5.61 10*3/MM3 (ref 1.7–7)
NEUTROPHILS NFR BLD AUTO: 65.7 % (ref 42.7–76)
NRBC BLD AUTO-RTO: 0 /100 WBC (ref 0–0.2)
PHOSPHATE SERPL-MCNC: 3.7 MG/DL (ref 2.5–4.5)
PLATELET # BLD AUTO: 205 10*3/MM3 (ref 140–450)
PMV BLD AUTO: 10.7 FL (ref 6–12)
POTASSIUM SERPL-SCNC: 4.1 MMOL/L (ref 3.5–5.2)
PROCALCITONIN SERPL-MCNC: 0.16 NG/ML (ref 0–0.25)
RBC # BLD AUTO: 4.35 10*6/MM3 (ref 4.14–5.8)
SODIUM SERPL-SCNC: 135 MMOL/L (ref 136–145)
WBC NRBC COR # BLD: 8.54 10*3/MM3 (ref 3.4–10.8)

## 2022-05-14 PROCEDURE — 84145 PROCALCITONIN (PCT): CPT | Performed by: INTERNAL MEDICINE

## 2022-05-14 PROCEDURE — 25010000002 LORAZEPAM PER 2 MG: Performed by: INTERNAL MEDICINE

## 2022-05-14 PROCEDURE — 85025 COMPLETE CBC W/AUTO DIFF WBC: CPT | Performed by: INTERNAL MEDICINE

## 2022-05-14 PROCEDURE — 84100 ASSAY OF PHOSPHORUS: CPT | Performed by: INTERNAL MEDICINE

## 2022-05-14 PROCEDURE — 25010000002 THIAMINE PER 100 MG: Performed by: INTERNAL MEDICINE

## 2022-05-14 PROCEDURE — 25010000002 ENOXAPARIN PER 10 MG: Performed by: INTERNAL MEDICINE

## 2022-05-14 PROCEDURE — 63710000001 METHYLPREDNISOLONE 4 MG TABLET THERAPY PACK 21 EACH DISP PACK: Performed by: INTERNAL MEDICINE

## 2022-05-14 PROCEDURE — 71045 X-RAY EXAM CHEST 1 VIEW: CPT

## 2022-05-14 PROCEDURE — 25010000002 HYDROMORPHONE PER 4 MG: Performed by: INTERNAL MEDICINE

## 2022-05-14 PROCEDURE — 80048 BASIC METABOLIC PNL TOTAL CA: CPT | Performed by: INTERNAL MEDICINE

## 2022-05-14 RX ORDER — METHYLPREDNISOLONE 4 MG/1
4 TABLET ORAL
Status: DISCONTINUED | OUTPATIENT
Start: 2022-05-16 | End: 2022-05-15

## 2022-05-14 RX ORDER — METHYLPREDNISOLONE 4 MG/1
8 TABLET ORAL
Status: COMPLETED | OUTPATIENT
Start: 2022-05-15 | End: 2022-05-15

## 2022-05-14 RX ORDER — METHYLPREDNISOLONE 4 MG/1
4 TABLET ORAL
Status: COMPLETED | OUTPATIENT
Start: 2022-05-19 | End: 2022-05-19

## 2022-05-14 RX ORDER — GABAPENTIN 100 MG/1
100 CAPSULE ORAL EVERY 12 HOURS SCHEDULED
Status: DISCONTINUED | OUTPATIENT
Start: 2022-05-14 | End: 2022-05-21 | Stop reason: HOSPADM

## 2022-05-14 RX ORDER — METHYLPREDNISOLONE 4 MG/1
4 TABLET ORAL
Status: COMPLETED | OUTPATIENT
Start: 2022-05-14 | End: 2022-05-14

## 2022-05-14 RX ORDER — METHYLPREDNISOLONE 4 MG/1
4 TABLET ORAL
Status: COMPLETED | OUTPATIENT
Start: 2022-05-17 | End: 2022-05-17

## 2022-05-14 RX ORDER — METHYLPREDNISOLONE 4 MG/1
4 TABLET ORAL
Status: DISCONTINUED | OUTPATIENT
Start: 2022-05-15 | End: 2022-05-15

## 2022-05-14 RX ORDER — METHYLPREDNISOLONE 4 MG/1
8 TABLET ORAL ONCE
Status: COMPLETED | OUTPATIENT
Start: 2022-05-14 | End: 2022-05-14

## 2022-05-14 RX ORDER — METHYLPREDNISOLONE 4 MG/1
4 TABLET ORAL
Status: COMPLETED | OUTPATIENT
Start: 2022-05-18 | End: 2022-05-18

## 2022-05-14 RX ORDER — METHYLPREDNISOLONE 4 MG/1
8 TABLET ORAL
Status: COMPLETED | OUTPATIENT
Start: 2022-05-14 | End: 2022-05-14

## 2022-05-14 RX ADMIN — METHYLPREDNISOLONE 4 MG: 4 TABLET ORAL at 13:21

## 2022-05-14 RX ADMIN — Medication 10 ML: at 08:13

## 2022-05-14 RX ADMIN — LORAZEPAM 1 MG: 2 INJECTION INTRAMUSCULAR; INTRAVENOUS at 16:27

## 2022-05-14 RX ADMIN — LORAZEPAM 2 MG: 2 INJECTION INTRAMUSCULAR; INTRAVENOUS at 03:45

## 2022-05-14 RX ADMIN — HYDROMORPHONE HYDROCHLORIDE 0.5 MG: 1 INJECTION, SOLUTION INTRAMUSCULAR; INTRAVENOUS; SUBCUTANEOUS at 16:27

## 2022-05-14 RX ADMIN — LORAZEPAM 2 MG: 2 INJECTION INTRAMUSCULAR; INTRAVENOUS at 18:27

## 2022-05-14 RX ADMIN — LORAZEPAM 2 MG: 2 INJECTION INTRAMUSCULAR; INTRAVENOUS at 01:58

## 2022-05-14 RX ADMIN — METHYLPREDNISOLONE 8 MG: 4 TABLET ORAL at 11:41

## 2022-05-14 RX ADMIN — Medication 10 ML: at 20:01

## 2022-05-14 RX ADMIN — LORAZEPAM 1 MG: 2 INJECTION INTRAMUSCULAR; INTRAVENOUS at 00:21

## 2022-05-14 RX ADMIN — LORAZEPAM 2 MG: 2 INJECTION INTRAMUSCULAR; INTRAVENOUS at 15:05

## 2022-05-14 RX ADMIN — DEXMEDETOMIDINE HYDROCHLORIDE 0.8 MCG/KG/HR: 4 INJECTION INTRAVENOUS at 00:21

## 2022-05-14 RX ADMIN — THIAMINE HYDROCHLORIDE 200 MG: 100 INJECTION, SOLUTION INTRAMUSCULAR; INTRAVENOUS at 12:15

## 2022-05-14 RX ADMIN — METHYLPREDNISOLONE 4 MG: 4 TABLET ORAL at 18:26

## 2022-05-14 RX ADMIN — LORAZEPAM 1 MG: 2 INJECTION INTRAMUSCULAR; INTRAVENOUS at 08:12

## 2022-05-14 RX ADMIN — FOLIC ACID 1 MG: 5 INJECTION, SOLUTION INTRAMUSCULAR; INTRAVENOUS; SUBCUTANEOUS at 12:14

## 2022-05-14 RX ADMIN — GABAPENTIN 100 MG: 100 CAPSULE ORAL at 11:41

## 2022-05-14 RX ADMIN — METHYLPREDNISOLONE 8 MG: 4 TABLET ORAL at 20:08

## 2022-05-14 RX ADMIN — HYDROCODONE BITARTRATE AND ACETAMINOPHEN 1 TABLET: 7.5; 325 TABLET ORAL at 13:20

## 2022-05-14 RX ADMIN — GABAPENTIN 100 MG: 100 CAPSULE ORAL at 20:01

## 2022-05-14 RX ADMIN — LORAZEPAM 1 MG: 1 TABLET ORAL at 13:20

## 2022-05-14 RX ADMIN — NICOTINE 1 PATCH: 21 PATCH, EXTENDED RELEASE TRANSDERMAL at 08:13

## 2022-05-14 RX ADMIN — HYDROCODONE BITARTRATE AND ACETAMINOPHEN 1 TABLET: 7.5; 325 TABLET ORAL at 20:01

## 2022-05-14 RX ADMIN — FAMOTIDINE 20 MG: 10 INJECTION INTRAVENOUS at 20:02

## 2022-05-14 RX ADMIN — HYDROMORPHONE HYDROCHLORIDE 0.5 MG: 1 INJECTION, SOLUTION INTRAMUSCULAR; INTRAVENOUS; SUBCUTANEOUS at 21:16

## 2022-05-14 RX ADMIN — LORAZEPAM 2 MG: 2 INJECTION INTRAMUSCULAR; INTRAVENOUS at 22:22

## 2022-05-14 RX ADMIN — FAMOTIDINE 20 MG: 10 INJECTION INTRAVENOUS at 08:12

## 2022-05-14 RX ADMIN — LIDOCAINE 2 PATCH: 50 PATCH TOPICAL at 08:12

## 2022-05-14 RX ADMIN — ENOXAPARIN SODIUM 40 MG: 100 INJECTION SUBCUTANEOUS at 20:01

## 2022-05-14 NOTE — NURSING NOTE
Pt trying to sit up in bed after precedex gtt stopped, and found to be a/o x4 on assessment.  Pt educated on need for restraints and is agreeable to discussion with dayshift RN about attempting to remove restraints if mentation remains clear.

## 2022-05-14 NOTE — PROGRESS NOTES
LOS: 5 days   Patient Care Team:  Provider, No Known as PCP - General    Subjective     Patient is new to me today I am picking up coverage from Dr. Ferguson.  He is following for acute alcohol withdrawal.  Apparently the patient was admitted to the hospital back on the ninth with low back pain and weakness in his left leg.  Orthopedics has evaluated and not a surgical candidate recommended a Medrol Dosepak and possible gabapentin if he had worsening radicular symptoms and if failure of physical therapy consider epidural injection in 4 to 6 weeks  Patient is awake he is complaining of pain in his left thigh feels like a charley horse but not.  Not so much back pain.  Little he says no nausea no shortness of breath  Review of Systems:          Objective     Vital Signs  Vital Sign Min/Max for last 24 hours  Temp  Min: 97.6 °F (36.4 °C)  Max: 98.3 °F (36.8 °C)   BP  Min: 88/59  Max: 147/87   Pulse  Min: 76  Max: 112   Resp  Min: 25  Max: 28   SpO2  Min: 95 %  Max: 100 %   Flow (L/min)  Min: 4  Max: 6   No data recorded        Ventilator/Non-Invasive Ventilation Settings (From admission, onward)            None                       Body mass index is 21.98 kg/m².  I/O last 3 completed shifts:  In: 3046 [I.V.:3046]  Out: 1550 [Urine:1550]  No intake/output data recorded.        Physical Exam:  General Appearance: Well-developed male he is resting in bed he was sleeping on my arrival.  He does awaken he is following commands.  He is off Precedex  Eyes: Conjunctiva are clear and anicteric pupils are equal about 3 mm reactive to light  ENT: Oral mucous membranes are little dry, nasal septum midline, he has several what looks like subcutaneous cyst both sides of his face looks like he has had pretty bad acne  Neck: Trachea midline no palpable adenopathy or thyromegaly  Lungs: Clear I did not hear any wheezes rales or rhonchi.  He was nonlabored symmetric expansion.  Oxygen saturation was 100% on 4 L O2  Cardiac:  Regular rate rhythm no murmur no gallop  Abdomen: Soft no palpable hepatosplenomegaly or masses  : Not examined  Musculoskeletal: He is moving all 4 extremities.  He can lift both legs off the bed.  His arms up he really did not have significant tremor  Skin: Warm and dry no jaundice no petechiae  Neuro: He is alert and oriented he is cooperative he is a little lethargic but he is following commands with all 4 extremities  Extremities/P Vascular: No clubbing no cyanosis no edema palpable radial dorsalis pedis pulses  MSE: He was appropriate with me       Labs:  Results from last 7 days   Lab Units 05/14/22  0343 05/13/22  0519 05/13/22  0414 05/12/22  0322 05/11/22  0559 05/10/22  0459 05/09/22  1414   GLUCOSE mg/dL 117* 174* 365* 56* 73 83 80   SODIUM mmol/L 135* 135* 134* 134* 134* 138 138   POTASSIUM mmol/L 4.1 4.7 5.4* 4.1 4.1 3.8 3.2*   MAGNESIUM mg/dL  --   --   --  1.8  --   --   --    CO2 mmol/L 21.0* 22.0 21.0* 21.3* 23.0 25.2 22.7   CHLORIDE mmol/L 101 104 105 99 97* 101 101   ANION GAP mmol/L 13.0 9.0 8.0 13.7 14.0 11.8 14.3   CREATININE mg/dL 0.74* 0.55* 0.53* 0.67* 0.56* 0.78 0.62*   BUN mg/dL 5* 4* 3* 7 6 7 3*   BUN / CREAT RATIO  6.8* 7.3 5.7* 10.4 10.7 9.0 4.8*   CALCIUM mg/dL 9.4 9.0 8.3* 8.9 8.6 8.2* 8.2*   ALK PHOS U/L  --   --  103 122* 119* 126* 117   TOTAL PROTEIN g/dL  --   --  5.7* 6.6 6.1 6.2 6.6   ALT (SGPT) U/L  --   --  23 29 33 44* 47*   AST (SGOT) U/L  --   --  28 42* 50* 69* 99*   BILIRUBIN mg/dL  --   --  1.0 0.9 0.9 1.3* 0.7   ALBUMIN g/dL  --   --  3.00* 3.40* 3.30* 3.20* 3.50   GLOBULIN gm/dL  --   --  2.7 3.2 2.8 3.0 3.1     Estimated Creatinine Clearance: 124.8 mL/min (A) (by C-G formula based on SCr of 0.74 mg/dL (L)).      Results from last 7 days   Lab Units 05/14/22  0343 05/13/22  0414 05/12/22  0322 05/11/22  0559 05/10/22  0459 05/09/22  1414   WBC 10*3/mm3 8.54 7.18 9.17 7.28 7.36 8.08   RBC 10*6/mm3 4.35 4.00* 4.13* 4.27 4.09* 4.23   HEMOGLOBIN g/dL 14.5 13.5 14.0 14.3  13.9 14.8   HEMATOCRIT % 42.0 39.7 39.6 40.2 38.5 40.9   MCV fL 96.6 99.3* 95.9 94.1 94.1 96.7   MCH pg 33.3* 33.8* 33.9* 33.5* 34.0* 35.0*   MCHC g/dL 34.5 34.0 35.4 35.6 36.1* 36.2*   RDW % 13.3 12.5 12.9 13.1 13.4 14.1   RDW-SD fl 46.4 45.9 45.0 44.3 45.5 49.1   MPV fL 10.7 10.1 10.2 9.8 9.7 9.5   PLATELETS 10*3/mm3 205 199 203 211 215 241   NEUTROPHIL % % 65.7 65.3 69.2  --  71.4 66.9   LYMPHOCYTE % % 17.6* 20.2 19.5*  --  18.3* 22.6   MONOCYTES % % 14.6* 11.6 8.5  --  8.8 8.5   EOSINOPHIL % % 0.9 1.8 1.7  --  0.4 1.0   BASOPHIL % % 0.7 0.7 0.7  --  0.8 0.6   IMM GRAN % % 0.5 0.4 0.4  --  0.3  --    NEUTROS ABS 10*3/mm3 5.61 4.69 6.34  --  5.25 5.40   LYMPHS ABS 10*3/mm3 1.50 1.45 1.79  --  1.35 1.83   MONOS ABS 10*3/mm3 1.25* 0.83 0.78  --  0.65 0.69   EOS ABS 10*3/mm3 0.08 0.13 0.16  --  0.03 0.08   BASOS ABS 10*3/mm3 0.06 0.05 0.06  --  0.06 0.05   IMMATURE GRANS (ABS) 10*3/mm3 0.04 0.03 0.04  --  0.02  --    NRBC /100 WBC 0.0 0.0 0.0  --  0.1  --                  Results from last 7 days   Lab Units 05/11/22  0559   TSH uIU/mL 2.960     Results from last 7 days   Lab Units 05/14/22  0343   PROCALCITONIN ng/mL 0.16         Microbiology Results (last 10 days)     Procedure Component Value - Date/Time    COVID PRE-OP / PRE-PROCEDURE SCREENING ORDER (NO ISOLATION) - Swab, Nasopharynx [[id number]]  (Normal) Collected: 05/10/22 0652    Lab Status: Final result Specimen: Swab from Nasopharynx Updated: 05/10/22 1054    Narrative:      The following orders were created for panel order COVID PRE-OP / PRE-PROCEDURE SCREENING ORDER (NO ISOLATION) - Swab, Nasopharynx.  Procedure                               Abnormality         Status                     ---------                               -----------         ------                     COVID-19,APTIMA PANTHER(...[[id number]]  Normal              Final result                 Please view results for these tests on the individual orders.    COVID-19,APTIMA PANTHER(NAVARRO),  [facility], NP/OP SWAB IN UTM/VTM/SALINE TRANSPORT MEDIA,24 HR TAT - Swab, Nasopharynx [[id number]]  (Normal) Collected: 05/10/22 0652    Lab Status: Final result Specimen: Swab from Nasopharynx Updated: 05/10/22 1054     COVID19 Not Detected    Narrative:      Fact sheet for providers: https://www.fda.gov/media/255740/download     Fact sheet for patients: https://www.fda.gov/media/479932/download    Test performed by RT PCR.              enoxaparin, 40 mg, Subcutaneous, Nightly  famotidine, 20 mg, Intravenous, Q12H  folic acid (FOLVITE) IVPB, 1 mg, Intravenous, Daily  lidocaine, 2 patch, Transdermal, Q24H  LORazepam, 1 mg, Intravenous, Q8H  nicotine, 1 patch, Transdermal, Q24H  sodium chloride, 10 mL, Intravenous, Q12H  thiamine (VITAMIN B1) IVPB, 200 mg, Intravenous, Daily      dexmedetomidine, 0.2-1.5 mcg/kg/hr, Last Rate: Stopped (05/14/22 0624)  dextrose 5 % and sodium chloride 0.9 % with KCl 20 mEq, 30 mL/hr, Last Rate: 30 mL/hr (05/13/22 1209)        Diagnostics:  XR Chest 1 View    Result Date: 5/14/2022  SINGLE VIEW OF THE CHEST  HISTORY: Respiratory failure  COMPARISON: None available.  FINDINGS: Heart size is within normal limits for technique. There appears to be some vascular congestion. There is bibasilar atelectasis. No pneumothorax is seen. No definite effusion is identified.      Vascular congestion, as well as bibasilar atelectasis.  This report was finalized on 5/14/2022 4:49 AM by Dr. Tammy Mir M.D.      XR Spine Lumbar Complete 4+VW    Result Date: 5/9/2022  LUMBAR SPINE X-RAYS  HISTORY: Back pain following injury.  TECHNIQUE: Five x-rays of the lumbosacral spine are provided. There is no previous imaging for correlation.  FINDINGS: There is atheromatous vascular calcification along the abdominal aorta. Lumbar vertebral body height, alignment, and disc height are normal. No pars defect or bone lesion is present. The visualized upper pelvic bones and joints appear normal and the bowel gas  pattern appears normal.      Atheromatous vascular calcification. There is degenerative facet joint change L5-S1 without subluxation. Otherwise negative lumbar x-rays.  This report was finalized on 5/9/2022 11:38 AM by Dr. Kehinde Dudley M.D.      MRI Lumbar Spine With & Without Contrast    Result Date: 5/10/2022  MRI EXAMINATION OF THE LUMBAR SPINE WITH AND WITHOUT CONTRAST  HISTORY: Back pain, left radiculopathy.  COMPARISON: None.  FINDINGS: The alignment of the lumbar spine is within normal limits. Mild disc desiccation and loss of disc height is noted at L4-L5 and L5-S1. The conus is at T12-L1.  L1-L2: There is no evidence of disc bulge or herniation.  L2-L3: There is no evidence of disc bulge or herniation.  L3-L4: A mild central broad-based disc osteophyte complex is present which results in mild flattening of ventral surface of the thecal sac. Mild facet degenerative disease is present bilaterally.  L4-L5: Mild-to-moderate facet degenerative disease is present bilaterally. There is mild central canal stenosis and mild-to-moderate lateral recess narrowing bilaterally secondary to a broad-based disc osteophyte complex and posterior element degenerative disease. This is accentuated by congenitally shortened pedicles. Mild foraminal stenosis is present bilaterally.  L5-S1: Transitional anatomy is appreciated with partial sacralization of L5. Mild facet degenerative disease is present bilaterally. A central disc osteophyte complex is present which results in mild flattening of ventral surface of the thecal sac. It approaches but does not abut or displace the traversing S1 nerve roots. Mild facet degenerative disease is present bilaterally.      No evidence of disc herniation, compression fracture or compression deformity. Multilevel degenerative disease involving the lumbar spine is noted as described above including mild facet degenerative disease and broad-based disc osteophyte complexes. The degenerative  disease is accentuated by congenitally shortened pedicles at L4-L5 and L5-S1. At L4-L5 there is mild central canal stenosis and mild-to-moderate lateral recess narrowing bilaterally. A central disc osteophyte complex is present at L5-S1 which approaches but does not abut or displace the traversing S1 nerve roots.  This report was finalized on 5/10/2022 2:06 PM by Dr. Fabiano Godwin M.D.          Chest x-ray reviewed looks like some vascular congestion/edema may be a little basilar atelectasis    Active Hospital Problems    Diagnosis  POA   • **Intractable back pain [M54.9]  Yes   • Hypokalemia [E87.6]  Yes   • Tobacco abuse [Z72.0]  Yes   • Alcohol withdrawal (HCC) [F10.239]  Yes   • Heart burn [R12]  Yes      Resolved Hospital Problems   No resolved problems to display.         Assessment & Plan     1. Acute alcohol withdrawal better.  He is currently off of Precedex.  We will observe only been a short while if he can stay off for 8 to 12 hours then we can probably move him to the floor  2. Intractable back pain has been evaluated by Ortho had MRI small area of increased signal in the L4-5 vertebral bodies but not concerning for significant injury.  I recommended Medrol Dosepak possible gabapentin if continued radicular symptoms.  Physical therapy and consider epidural injection if this fails in 4 to 6 weeks.  We will be glad to see him in the office in follow-up.  I am going to go ahead and start the Medrol Dosepak does not look like its been started.  We will give him a little low-dose gabapentin and see if that helps with the pain running down the anterior left thigh  3. Tobacco abuse continue NicoDerm  4. Hypertension pretty severe couple of days ago improving probably related to his withdrawal  5. Acute hypoxic respiratory failure look like he had some vascular congestion and early edema, he got a dose of Lasix this morning.  Looks much better wean O2 as tolerated.  6. Hypoglycemia he got hyperglycemic with  the dextrose infusion that is been cut way back.  We will see what his sugars do we may be able to stop that if he can be awake enough to eat            Plan for disposition:    Vic Mercedes Jr, MD  05/14/22  07:01 EDT    Time: Critical care time 36 minutes

## 2022-05-14 NOTE — PROGRESS NOTES
Name: Bogdan Jeter ADMIT: 2022   : 1975  PCP: Provider, No Known    MRN: [id number] LOS: 5 days   AGE/SEX: 46 y.o. male  ROOM: [facility]   Subjective   Chief Complaint   Patient presents with   • Back Pain     Mr. Jeter is a 46 year old male who presented to the hospital with complaints of back pain. Hospital course complicated by severe alcohol w/d.     Severe alcohol w/d requiring high doses of ativan as well as transfer to the ICU and precedex    Off precedex  Still requiring ativan    ROS  No f/c  No n/v  No cp/palp  Mild soa/cough    Objective   Vital Signs  Temp:  [97.6 °F (36.4 °C)-98.3 °F (36.8 °C)] 97.9 °F (36.6 °C)  Heart Rate:  [] 98  Resp:  [25-28] 25  BP: ()/() 101/73  SpO2:  [95 %-100 %] 100 %  on  Flow (L/min):  [4] 4;   Device (Oxygen Therapy): nasal cannula  Body mass index is 21.98 kg/m².    Physical Exam  Constitutional:       General: He is in acute distress.      Appearance: He is ill-appearing.   HENT:      Head: Normocephalic and atraumatic.   Eyes:      General: No scleral icterus.  Cardiovascular:      Rate and Rhythm: Normal rate and regular rhythm.      Heart sounds: Normal heart sounds.   Pulmonary:      Effort: Respiratory distress (slight, decreased bs at bases) present.      Breath sounds: Normal breath sounds.   Abdominal:      General: There is no distension.      Palpations: Abdomen is soft.      Tenderness: There is no abdominal tenderness.   Musculoskeletal:      Cervical back: Neck supple.     awake but drowsy     Results Review:       I reviewed the patient's new clinical results.  Results from last 7 days   Lab Units 22  0343 22  0414 22  0322 22  0559   WBC 10*3/mm3 8.54 7.18 9.17 7.28   HEMOGLOBIN g/dL 14.5 13.5 14.0 14.3   PLATELETS 10*3/mm3 205 199 203 211     Results from last 7 days   Lab Units 22  0343 22  0519 22  0414 22  0322   SODIUM mmol/L 135* 135* 134* 134*   POTASSIUM mmol/L 4.1  4.7 5.4* 4.1   CHLORIDE mmol/L 101 104 105 99   CO2 mmol/L 21.0* 22.0 21.0* 21.3*   BUN mg/dL 5* 4* 3* 7   CREATININE mg/dL 0.74* 0.55* 0.53* 0.67*   GLUCOSE mg/dL 117* 174* 365* 56*   Estimated Creatinine Clearance: 124.8 mL/min (A) (by C-G formula based on SCr of 0.74 mg/dL (L)).  Results from last 7 days   Lab Units 05/13/22  0414 05/12/22  0322 05/11/22  0559 05/10/22  0459   ALBUMIN g/dL 3.00* 3.40* 3.30* 3.20*   BILIRUBIN mg/dL 1.0 0.9 0.9 1.3*   ALK PHOS U/L 103 122* 119* 126*   AST (SGOT) U/L 28 42* 50* 69*   ALT (SGPT) U/L 23 29 33 44*     Results from last 7 days   Lab Units 05/14/22 0343 05/13/22 0519 05/13/22 0414 05/12/22 0322 05/11/22 0559 05/10/22  0459   CALCIUM mg/dL 9.4 9.0 8.3* 8.9 8.6 8.2*   ALBUMIN g/dL  --   --  3.00* 3.40* 3.30* 3.20*   MAGNESIUM mg/dL  --   --   --  1.8  --   --    PHOSPHORUS mg/dL 3.7  --  3.7 3.6  --   --      Results from last 7 days   Lab Units 05/14/22  0343   PROCALCITONIN ng/mL 0.16       Coag     HbA1C No results found for: HGBA1C  Infection   Results from last 7 days   Lab Units 05/14/22  0343   PROCALCITONIN ng/mL 0.16     Radiology(recent) XR Chest 1 View    Result Date: 5/14/2022  Vascular congestion, as well as bibasilar atelectasis.  This report was finalized on 5/14/2022 4:49 AM by Dr. Tammy Mir M.D.      No results found for: TROPONINT, TROPONINI, BNP  No components found for: TSH;2    enoxaparin, 40 mg, Subcutaneous, Nightly  famotidine, 20 mg, Intravenous, Q12H  folic acid (FOLVITE) IVPB, 1 mg, Intravenous, Daily  gabapentin, 100 mg, Oral, Q12H  lidocaine, 2 patch, Transdermal, Q24H  LORazepam, 1 mg, Intravenous, Q8H  methylPREDNISolone, 4 mg, Oral, After Dinner  [START ON 5/15/2022] methylPREDNISolone, 4 mg, Oral, TID Around Food  [START ON 5/16/2022] methylPREDNISolone, 4 mg, Oral, 4x Daily Taper  [START ON 5/17/2022] methylPREDNISolone, 4 mg, Oral, TID Around Food  [START ON 5/18/2022] methylPREDNISolone, 4 mg, Oral, Before Breakfast  [START  ON 5/18/2022] methylPREDNISolone, 4 mg, Oral, Tonight  [START ON 5/19/2022] methylPREDNISolone, 4 mg, Oral, Before Breakfast  methylPREDNISolone, 8 mg, Oral, Tonight  [START ON 5/15/2022] methylPREDNISolone, 8 mg, Oral, Tonight  nicotine, 1 patch, Transdermal, Q24H  sodium chloride, 10 mL, Intravenous, Q12H  thiamine (VITAMIN B1) IVPB, 200 mg, Intravenous, Daily       Diet Regular      Assessment & Plan      Active Hospital Problems    Diagnosis  POA   • **Intractable back pain [M54.9]  Yes   • Hypokalemia [E87.6]  Yes   • Tobacco abuse [Z72.0]  Yes   • Alcohol withdrawal (HCC) [F10.239]  Yes   • Heart burn [R12]  Yes      Resolved Hospital Problems   No resolved problems to display.     Mr. Jeter is a 46 year old male who presented to the hospital with complaints of back pain. Hospital course complicated by severe alcohol w/d.     · Severe alcohol w/d requiring high doses of ativan as well as transfer to the ICU and precedex  · Intractable back pain: MRI now returned with some DDD but no acute fracture/compression/disc herniation. L4-5 with shortened pedicles and mild central canal stenosis. Nothing surgical it seems. Orthopedic surgery was consulted- they agree with supportive care with pain meds, etc. PT evaluation. Medrol dose pack started  · Nicotine patch  · Monitor electrolytes  · Monitor BG      Thanks to VLADISLAV/Dr. Mercedes/Dr. Ferguson for care in ICU    DW RN      Cristian Lopez MD  [facility]  05/14/22  13:24 EDT

## 2022-05-15 LAB
ANION GAP SERPL CALCULATED.3IONS-SCNC: 13 MMOL/L (ref 5–15)
BUN SERPL-MCNC: 8 MG/DL (ref 6–20)
BUN/CREAT SERPL: 13.6 (ref 7–25)
CALCIUM SPEC-SCNC: 9.5 MG/DL (ref 8.6–10.5)
CHLORIDE SERPL-SCNC: 100 MMOL/L (ref 98–107)
CO2 SERPL-SCNC: 22 MMOL/L (ref 22–29)
CREAT SERPL-MCNC: 0.59 MG/DL (ref 0.76–1.27)
EGFRCR SERPLBLD CKD-EPI 2021: 120.4 ML/MIN/1.73
GLUCOSE SERPL-MCNC: 91 MG/DL (ref 65–99)
MAGNESIUM SERPL-MCNC: 1.7 MG/DL (ref 1.6–2.6)
PHOSPHATE SERPL-MCNC: 5 MG/DL (ref 2.5–4.5)
POTASSIUM SERPL-SCNC: 4.5 MMOL/L (ref 3.5–5.2)
SODIUM SERPL-SCNC: 135 MMOL/L (ref 136–145)

## 2022-05-15 PROCEDURE — 25010000002 LORAZEPAM PER 2 MG: Performed by: INTERNAL MEDICINE

## 2022-05-15 PROCEDURE — 84100 ASSAY OF PHOSPHORUS: CPT | Performed by: INTERNAL MEDICINE

## 2022-05-15 PROCEDURE — 25010000002 ENOXAPARIN PER 10 MG: Performed by: INTERNAL MEDICINE

## 2022-05-15 PROCEDURE — 80048 BASIC METABOLIC PNL TOTAL CA: CPT | Performed by: INTERNAL MEDICINE

## 2022-05-15 PROCEDURE — 83735 ASSAY OF MAGNESIUM: CPT | Performed by: INTERNAL MEDICINE

## 2022-05-15 PROCEDURE — 63710000001 METHYLPREDNISOLONE 4 MG TABLET THERAPY PACK 21 EACH DISP PACK: Performed by: INTERNAL MEDICINE

## 2022-05-15 PROCEDURE — 25010000002 HYDROMORPHONE PER 4 MG: Performed by: INTERNAL MEDICINE

## 2022-05-15 PROCEDURE — 90791 PSYCH DIAGNOSTIC EVALUATION: CPT

## 2022-05-15 PROCEDURE — 25010000002 THIAMINE PER 100 MG: Performed by: INTERNAL MEDICINE

## 2022-05-15 RX ORDER — LORAZEPAM 2 MG/ML
2 INJECTION INTRAMUSCULAR
Status: DISCONTINUED | OUTPATIENT
Start: 2022-05-15 | End: 2022-05-21 | Stop reason: HOSPADM

## 2022-05-15 RX ORDER — LORAZEPAM 1 MG/1
1 TABLET ORAL
Status: DISCONTINUED | OUTPATIENT
Start: 2022-05-15 | End: 2022-05-21 | Stop reason: HOSPADM

## 2022-05-15 RX ORDER — HYDROCODONE BITARTRATE AND ACETAMINOPHEN 7.5; 325 MG/1; MG/1
1 TABLET ORAL EVERY 4 HOURS PRN
Status: DISCONTINUED | OUTPATIENT
Start: 2022-05-15 | End: 2022-05-18

## 2022-05-15 RX ORDER — HYDROMORPHONE HYDROCHLORIDE 1 MG/ML
0.5 INJECTION, SOLUTION INTRAMUSCULAR; INTRAVENOUS; SUBCUTANEOUS EVERY 4 HOURS PRN
Status: DISCONTINUED | OUTPATIENT
Start: 2022-05-15 | End: 2022-05-18

## 2022-05-15 RX ORDER — LORAZEPAM 2 MG/ML
1 INJECTION INTRAMUSCULAR
Status: DISCONTINUED | OUTPATIENT
Start: 2022-05-15 | End: 2022-05-21 | Stop reason: HOSPADM

## 2022-05-15 RX ORDER — CLONIDINE HYDROCHLORIDE 0.1 MG/1
0.1 TABLET ORAL EVERY 8 HOURS SCHEDULED
Status: DISCONTINUED | OUTPATIENT
Start: 2022-05-15 | End: 2022-05-17

## 2022-05-15 RX ORDER — LORAZEPAM 1 MG/1
2 TABLET ORAL
Status: DISCONTINUED | OUTPATIENT
Start: 2022-05-15 | End: 2022-05-21 | Stop reason: HOSPADM

## 2022-05-15 RX ADMIN — Medication 10 ML: at 08:25

## 2022-05-15 RX ADMIN — FAMOTIDINE 20 MG: 10 INJECTION INTRAVENOUS at 08:17

## 2022-05-15 RX ADMIN — FOLIC ACID 1 MG: 5 INJECTION, SOLUTION INTRAMUSCULAR; INTRAVENOUS; SUBCUTANEOUS at 08:24

## 2022-05-15 RX ADMIN — HYDROCODONE BITARTRATE AND ACETAMINOPHEN 1 TABLET: 7.5; 325 TABLET ORAL at 11:31

## 2022-05-15 RX ADMIN — LORAZEPAM 1 MG: 1 TABLET ORAL at 23:20

## 2022-05-15 RX ADMIN — LIDOCAINE 2 PATCH: 50 PATCH TOPICAL at 08:17

## 2022-05-15 RX ADMIN — LORAZEPAM 1 MG: 1 TABLET ORAL at 11:31

## 2022-05-15 RX ADMIN — CLONIDINE HYDROCHLORIDE 0.1 MG: 0.1 TABLET ORAL at 19:45

## 2022-05-15 RX ADMIN — Medication 10 ML: at 20:49

## 2022-05-15 RX ADMIN — HYDROCODONE BITARTRATE AND ACETAMINOPHEN 1 TABLET: 7.5; 325 TABLET ORAL at 15:13

## 2022-05-15 RX ADMIN — LORAZEPAM 2 MG: 1 TABLET ORAL at 18:14

## 2022-05-15 RX ADMIN — HYDROMORPHONE HYDROCHLORIDE 0.5 MG: 1 INJECTION, SOLUTION INTRAMUSCULAR; INTRAVENOUS; SUBCUTANEOUS at 12:48

## 2022-05-15 RX ADMIN — THIAMINE HYDROCHLORIDE 200 MG: 100 INJECTION, SOLUTION INTRAMUSCULAR; INTRAVENOUS at 08:24

## 2022-05-15 RX ADMIN — LORAZEPAM 1 MG: 2 INJECTION INTRAMUSCULAR; INTRAVENOUS at 01:07

## 2022-05-15 RX ADMIN — LORAZEPAM 1 MG: 2 INJECTION INTRAMUSCULAR; INTRAVENOUS at 16:59

## 2022-05-15 RX ADMIN — GABAPENTIN 100 MG: 100 CAPSULE ORAL at 20:44

## 2022-05-15 RX ADMIN — HYDROCODONE BITARTRATE AND ACETAMINOPHEN 1 TABLET: 7.5; 325 TABLET ORAL at 23:20

## 2022-05-15 RX ADMIN — HYDROMORPHONE HYDROCHLORIDE 0.5 MG: 1 INJECTION, SOLUTION INTRAMUSCULAR; INTRAVENOUS; SUBCUTANEOUS at 03:57

## 2022-05-15 RX ADMIN — NICOTINE 1 PATCH: 21 PATCH, EXTENDED RELEASE TRANSDERMAL at 08:18

## 2022-05-15 RX ADMIN — LORAZEPAM 2 MG: 2 INJECTION INTRAMUSCULAR; INTRAVENOUS at 20:44

## 2022-05-15 RX ADMIN — HYDROCODONE BITARTRATE AND ACETAMINOPHEN 1 TABLET: 7.5; 325 TABLET ORAL at 07:58

## 2022-05-15 RX ADMIN — METHYLPREDNISOLONE 4 MG: 4 TABLET ORAL at 12:46

## 2022-05-15 RX ADMIN — METHYLPREDNISOLONE 4 MG: 4 TABLET ORAL at 07:58

## 2022-05-15 RX ADMIN — HYDROMORPHONE HYDROCHLORIDE 0.5 MG: 1 INJECTION, SOLUTION INTRAMUSCULAR; INTRAVENOUS; SUBCUTANEOUS at 19:44

## 2022-05-15 RX ADMIN — GABAPENTIN 100 MG: 100 CAPSULE ORAL at 08:17

## 2022-05-15 RX ADMIN — METHYLPREDNISOLONE 8 MG: 4 TABLET ORAL at 20:47

## 2022-05-15 RX ADMIN — ENOXAPARIN SODIUM 40 MG: 100 INJECTION SUBCUTANEOUS at 20:44

## 2022-05-15 RX ADMIN — LORAZEPAM 2 MG: 2 INJECTION INTRAMUSCULAR; INTRAVENOUS at 03:57

## 2022-05-15 RX ADMIN — LORAZEPAM 1 MG: 2 INJECTION INTRAMUSCULAR; INTRAVENOUS at 08:18

## 2022-05-15 NOTE — PROGRESS NOTES
LOS: 6 days   Patient Care Team:  Provider, Eneida Known as PCP - General    Subjective   She still having some pain in his left thigh but he is eating and drinking better today    Review of Systems:          Objective     Vital Signs  Vital Sign Min/Max for last 24 hours  Temp  Min: 97.4 °F (36.3 °C)  Max: 98.9 °F (37.2 °C)   BP  Min: 94/61  Max: 140/88   Pulse  Min: 78  Max: 130   Resp  Min: 12  Max: 20   SpO2  Min: 94 %  Max: 100 %   Flow (L/min)  Min: 2  Max: 6   No data recorded        Ventilator/Non-Invasive Ventilation Settings (From admission, onward)            None                       Body mass index is 21.98 kg/m².  I/O last 3 completed shifts:  In: 3046 [I.V.:3046]  Out: 900 [Urine:900]  No intake/output data recorded.        Physical Exam:  General Appearance: Well-developed male he is resting in bed he was sleeping on my arrival.  He does awaken he is following commands.  He is off Precedex  Eyes: Conjunctiva are clear and anicteric pupils are equal about 3 mm reactive to light  ENT: Oral mucous membranes are little dry, nasal septum midline, he has several what looks like subcutaneous cyst both sides of his face looks like he has had pretty bad acne  Neck: Trachea midline no palpable adenopathy or thyromegaly  Lungs: Clear I did not hear any wheezes rales or rhonchi.  He was nonlabored symmetric expansion.  Oxygen saturation was 100% on 4 L O2  Cardiac: Regular rate rhythm no murmur no gallop  Abdomen: Soft no palpable hepatosplenomegaly or masses  : Not examined  Musculoskeletal: He is moving all 4 extremities.  He can lift both legs off the bed.  His arms up he really did not have significant tremor  Skin: Warm and dry no jaundice no petechiae  Neuro: He is alert and oriented he is cooperative is more alert and awake today.  He is cooperative following commands moving all 4 extremities very minimal tremor.  Extremities/P Vascular: No clubbing no cyanosis no edema palpable radial dorsalis  pedis pulses  MSE: He was acting appropriately       Labs:  Results from last 7 days   Lab Units 05/15/22  0430 05/14/22  0343 05/13/22  0519 05/13/22  0414 05/12/22  0322 05/11/22  0559 05/10/22  0459 05/09/22  1414   GLUCOSE mg/dL 91 117* 174* 365* 56* 73 83 80   SODIUM mmol/L 135* 135* 135* 134* 134* 134* 138 138   POTASSIUM mmol/L 4.5 4.1 4.7 5.4* 4.1 4.1 3.8 3.2*   MAGNESIUM mg/dL 1.7  --   --   --  1.8  --   --   --    CO2 mmol/L 22.0 21.0* 22.0 21.0* 21.3* 23.0 25.2 22.7   CHLORIDE mmol/L 100 101 104 105 99 97* 101 101   ANION GAP mmol/L 13.0 13.0 9.0 8.0 13.7 14.0 11.8 14.3   CREATININE mg/dL 0.59* 0.74* 0.55* 0.53* 0.67* 0.56* 0.78 0.62*   BUN mg/dL 8 5* 4* 3* 7 6 7 3*   BUN / CREAT RATIO  13.6 6.8* 7.3 5.7* 10.4 10.7 9.0 4.8*   CALCIUM mg/dL 9.5 9.4 9.0 8.3* 8.9 8.6 8.2* 8.2*   ALK PHOS U/L  --   --   --  103 122* 119* 126* 117   TOTAL PROTEIN g/dL  --   --   --  5.7* 6.6 6.1 6.2 6.6   ALT (SGPT) U/L  --   --   --  23 29 33 44* 47*   AST (SGOT) U/L  --   --   --  28 42* 50* 69* 99*   BILIRUBIN mg/dL  --   --   --  1.0 0.9 0.9 1.3* 0.7   ALBUMIN g/dL  --   --   --  3.00* 3.40* 3.30* 3.20* 3.50   GLOBULIN gm/dL  --   --   --  2.7 3.2 2.8 3.0 3.1     Estimated Creatinine Clearance: 156.5 mL/min (A) (by C-G formula based on SCr of 0.59 mg/dL (L)).      Results from last 7 days   Lab Units 05/14/22  0343 05/13/22  0414 05/12/22  0322 05/11/22  0559 05/10/22  0459 05/09/22  1414   WBC 10*3/mm3 8.54 7.18 9.17 7.28 7.36 8.08   RBC 10*6/mm3 4.35 4.00* 4.13* 4.27 4.09* 4.23   HEMOGLOBIN g/dL 14.5 13.5 14.0 14.3 13.9 14.8   HEMATOCRIT % 42.0 39.7 39.6 40.2 38.5 40.9   MCV fL 96.6 99.3* 95.9 94.1 94.1 96.7   MCH pg 33.3* 33.8* 33.9* 33.5* 34.0* 35.0*   MCHC g/dL 34.5 34.0 35.4 35.6 36.1* 36.2*   RDW % 13.3 12.5 12.9 13.1 13.4 14.1   RDW-SD fl 46.4 45.9 45.0 44.3 45.5 49.1   MPV fL 10.7 10.1 10.2 9.8 9.7 9.5   PLATELETS 10*3/mm3 205 199 203 211 215 241   NEUTROPHIL % % 65.7 65.3 69.2  --  71.4 66.9   LYMPHOCYTE % %  17.6* 20.2 19.5*  --  18.3* 22.6   MONOCYTES % % 14.6* 11.6 8.5  --  8.8 8.5   EOSINOPHIL % % 0.9 1.8 1.7  --  0.4 1.0   BASOPHIL % % 0.7 0.7 0.7  --  0.8 0.6   IMM GRAN % % 0.5 0.4 0.4  --  0.3  --    NEUTROS ABS 10*3/mm3 5.61 4.69 6.34  --  5.25 5.40   LYMPHS ABS 10*3/mm3 1.50 1.45 1.79  --  1.35 1.83   MONOS ABS 10*3/mm3 1.25* 0.83 0.78  --  0.65 0.69   EOS ABS 10*3/mm3 0.08 0.13 0.16  --  0.03 0.08   BASOS ABS 10*3/mm3 0.06 0.05 0.06  --  0.06 0.05   IMMATURE GRANS (ABS) 10*3/mm3 0.04 0.03 0.04  --  0.02  --    NRBC /100 WBC 0.0 0.0 0.0  --  0.1  --                  Results from last 7 days   Lab Units 05/11/22  0559   TSH uIU/mL 2.960     Results from last 7 days   Lab Units 05/14/22  0343   PROCALCITONIN ng/mL 0.16         Microbiology Results (last 10 days)     Procedure Component Value - Date/Time    COVID PRE-OP / PRE-PROCEDURE SCREENING ORDER (NO ISOLATION) - Swab, Nasopharynx [[id number]]  (Normal) Collected: 05/10/22 0652    Lab Status: Final result Specimen: Swab from Nasopharynx Updated: 05/10/22 1054    Narrative:      The following orders were created for panel order COVID PRE-OP / PRE-PROCEDURE SCREENING ORDER (NO ISOLATION) - Swab, Nasopharynx.  Procedure                               Abnormality         Status                     ---------                               -----------         ------                     COVID-19,APTIMA PANTHER(...[[id number]]  Normal              Final result                 Please view results for these tests on the individual orders.    COVID-19,APTIMA PANTHER(NAVARRO),[facility]/ANJU PRIMO, NP/OP SWAB IN UTM/VTM/SALINE TRANSPORT MEDIA,24 HR TAT - Swab, Nasopharynx [[id number]]  (Normal) Collected: 05/10/22 0652    Lab Status: Final result Specimen: Swab from Nasopharynx Updated: 05/10/22 1054     COVID19 Not Detected    Narrative:      Fact sheet for providers: https://www.fda.gov/media/153535/download     Fact sheet for patients: https://www.fda.gov/media/810202/download    Test  performed by RT PCR.              enoxaparin, 40 mg, Subcutaneous, Nightly  famotidine, 20 mg, Intravenous, Q12H  folic acid (FOLVITE) IVPB, 1 mg, Intravenous, Daily  gabapentin, 100 mg, Oral, Q12H  lidocaine, 2 patch, Transdermal, Q24H  LORazepam, 1 mg, Intravenous, Q8H  methylPREDNISolone, 4 mg, Oral, TID Around Food  [START ON 5/16/2022] methylPREDNISolone, 4 mg, Oral, 4x Daily Taper  [START ON 5/17/2022] methylPREDNISolone, 4 mg, Oral, TID Around Food  [START ON 5/18/2022] methylPREDNISolone, 4 mg, Oral, Before Breakfast  [START ON 5/18/2022] methylPREDNISolone, 4 mg, Oral, Tonight  [START ON 5/19/2022] methylPREDNISolone, 4 mg, Oral, Before Breakfast  methylPREDNISolone, 8 mg, Oral, Tonight  nicotine, 1 patch, Transdermal, Q24H  sodium chloride, 10 mL, Intravenous, Q12H  thiamine (VITAMIN B1) IVPB, 200 mg, Intravenous, Daily           Diagnostics:  XR Chest 1 View    Result Date: 5/14/2022  SINGLE VIEW OF THE CHEST  HISTORY: Respiratory failure  COMPARISON: None available.  FINDINGS: Heart size is within normal limits for technique. There appears to be some vascular congestion. There is bibasilar atelectasis. No pneumothorax is seen. No definite effusion is identified.      Vascular congestion, as well as bibasilar atelectasis.  This report was finalized on 5/14/2022 4:49 AM by Dr. Tammy Mir M.D.      XR Spine Lumbar Complete 4+VW    Result Date: 5/9/2022  LUMBAR SPINE X-RAYS  HISTORY: Back pain following injury.  TECHNIQUE: Five x-rays of the lumbosacral spine are provided. There is no previous imaging for correlation.  FINDINGS: There is atheromatous vascular calcification along the abdominal aorta. Lumbar vertebral body height, alignment, and disc height are normal. No pars defect or bone lesion is present. The visualized upper pelvic bones and joints appear normal and the bowel gas pattern appears normal.      Atheromatous vascular calcification. There is degenerative facet joint change L5-S1  without subluxation. Otherwise negative lumbar x-rays.  This report was finalized on 5/9/2022 11:38 AM by Dr. Kehinde Dudley M.D.      MRI Lumbar Spine With & Without Contrast    Result Date: 5/10/2022  MRI EXAMINATION OF THE LUMBAR SPINE WITH AND WITHOUT CONTRAST  HISTORY: Back pain, left radiculopathy.  COMPARISON: None.  FINDINGS: The alignment of the lumbar spine is within normal limits. Mild disc desiccation and loss of disc height is noted at L4-L5 and L5-S1. The conus is at T12-L1.  L1-L2: There is no evidence of disc bulge or herniation.  L2-L3: There is no evidence of disc bulge or herniation.  L3-L4: A mild central broad-based disc osteophyte complex is present which results in mild flattening of ventral surface of the thecal sac. Mild facet degenerative disease is present bilaterally.  L4-L5: Mild-to-moderate facet degenerative disease is present bilaterally. There is mild central canal stenosis and mild-to-moderate lateral recess narrowing bilaterally secondary to a broad-based disc osteophyte complex and posterior element degenerative disease. This is accentuated by congenitally shortened pedicles. Mild foraminal stenosis is present bilaterally.  L5-S1: Transitional anatomy is appreciated with partial sacralization of L5. Mild facet degenerative disease is present bilaterally. A central disc osteophyte complex is present which results in mild flattening of ventral surface of the thecal sac. It approaches but does not abut or displace the traversing S1 nerve roots. Mild facet degenerative disease is present bilaterally.      No evidence of disc herniation, compression fracture or compression deformity. Multilevel degenerative disease involving the lumbar spine is noted as described above including mild facet degenerative disease and broad-based disc osteophyte complexes. The degenerative disease is accentuated by congenitally shortened pedicles at L4-L5 and L5-S1. At L4-L5 there is mild central canal  stenosis and mild-to-moderate lateral recess narrowing bilaterally. A central disc osteophyte complex is present at L5-S1 which approaches but does not abut or displace the traversing S1 nerve roots.  This report was finalized on 5/10/2022 2:06 PM by Dr. Fabiano Godwin M.D.          Chest x-ray reviewed looks like some vascular congestion/edema may be a little basilar atelectasis    Active Hospital Problems    Diagnosis  POA   • **Intractable back pain [M54.9]  Yes   • Hypokalemia [E87.6]  Yes   • Tobacco abuse [Z72.0]  Yes   • Alcohol withdrawal (HCC) [F10.239]  Yes   • Heart burn [R12]  Yes      Resolved Hospital Problems   No resolved problems to display.         Assessment & Plan     1. Acute alcohol withdrawal better.  He is currently off of Precedex.  We will observe only been a short while if he can stay off for 8 to 12 hours then we can probably move him to the floor  2. Intractable back pain has been evaluated by Ortho had MRI small area of increased signal in the L4-5 vertebral bodies but not concerning for significant injury.  I recommended Medrol Dosepak possible gabapentin if continued radicular symptoms.  Physical therapy and consider epidural injection if this fails in 4 to 6 weeks.  We will be glad to see him in the office in follow-up.  I am going to go ahead and start the Medrol Dosepak does not look like its been started.  We will give him a little low-dose gabapentin and see if that helps with the pain running down the anterior left thigh  3. Tobacco abuse continue NicoDerm  4. Hypertension pretty severe couple of days ago improving probably related to his withdrawal  5. Acute hypoxic respiratory failure resolved  6. Hypoglycemia hypoglycemia resolved            Plan for disposition: Think he is improving I think we probably can transfer him out of the ICU    Vic Mercedes Jr, MD  05/15/22  07:11 EDT    Time:

## 2022-05-15 NOTE — PLAN OF CARE
Goal Outcome Evaluation:  Plan of Care Reviewed With: patient           Outcome Evaluation: patient transfered from critical care to  today.  Schedule ativan q8 as well as PRN per ciwa protocol.  Patients CIWA's have been a 6-7 since arrival on 5N.  Patient continues to complain of leg and back pain.  Medrol dose pack started as well as low dose gabapentin.  Patient receiving PRN norco and dilauded for pain as well as lidoderm patches.  Patient aox3 needs reorienting to time.  Patient states he cant walk, moving legs in bed with moderate strength. CTM closely.

## 2022-05-15 NOTE — NURSING NOTE
Access [facility] note.  Unable to complete evaluation at this time.  Patient is confused and visually hallucinating.    AC will return in hopes of completing evaluation when patient is appropriate.

## 2022-05-16 PROCEDURE — 97162 PT EVAL MOD COMPLEX 30 MIN: CPT

## 2022-05-16 PROCEDURE — 25010000002 LORAZEPAM PER 2 MG: Performed by: INTERNAL MEDICINE

## 2022-05-16 PROCEDURE — 97530 THERAPEUTIC ACTIVITIES: CPT

## 2022-05-16 PROCEDURE — 93010 ELECTROCARDIOGRAM REPORT: CPT | Performed by: INTERNAL MEDICINE

## 2022-05-16 PROCEDURE — 25010000002 HYDROMORPHONE PER 4 MG: Performed by: INTERNAL MEDICINE

## 2022-05-16 PROCEDURE — 97535 SELF CARE MNGMENT TRAINING: CPT

## 2022-05-16 PROCEDURE — 93005 ELECTROCARDIOGRAM TRACING: CPT | Performed by: INTERNAL MEDICINE

## 2022-05-16 PROCEDURE — 97166 OT EVAL MOD COMPLEX 45 MIN: CPT

## 2022-05-16 PROCEDURE — 82962 GLUCOSE BLOOD TEST: CPT

## 2022-05-16 PROCEDURE — 25010000002 ENOXAPARIN PER 10 MG: Performed by: INTERNAL MEDICINE

## 2022-05-16 RX ORDER — FOLIC ACID 1 MG/1
1 TABLET ORAL DAILY
Status: DISCONTINUED | OUTPATIENT
Start: 2022-05-17 | End: 2022-05-21 | Stop reason: HOSPADM

## 2022-05-16 RX ADMIN — LORAZEPAM 1 MG: 1 TABLET ORAL at 06:16

## 2022-05-16 RX ADMIN — LORAZEPAM 1 MG: 2 INJECTION INTRAMUSCULAR; INTRAVENOUS at 01:15

## 2022-05-16 RX ADMIN — GABAPENTIN 100 MG: 100 CAPSULE ORAL at 08:46

## 2022-05-16 RX ADMIN — MAGNESIUM HYDROXIDE,ALUMINUM HYDROXICE,SIMETHICONE 15 ML: 240; 2400; 2400 SUSPENSION ORAL at 02:27

## 2022-05-16 RX ADMIN — LORAZEPAM 2 MG: 2 INJECTION INTRAMUSCULAR; INTRAVENOUS at 20:31

## 2022-05-16 RX ADMIN — GABAPENTIN 100 MG: 100 CAPSULE ORAL at 20:31

## 2022-05-16 RX ADMIN — CLONIDINE HYDROCHLORIDE 0.1 MG: 0.1 TABLET ORAL at 20:31

## 2022-05-16 RX ADMIN — LIDOCAINE 2 PATCH: 50 PATCH TOPICAL at 08:47

## 2022-05-16 RX ADMIN — FOLIC ACID 1 MG: 5 INJECTION, SOLUTION INTRAMUSCULAR; INTRAVENOUS; SUBCUTANEOUS at 08:48

## 2022-05-16 RX ADMIN — HYDROCODONE BITARTRATE AND ACETAMINOPHEN 1 TABLET: 7.5; 325 TABLET ORAL at 08:46

## 2022-05-16 RX ADMIN — LORAZEPAM 1 MG: 2 INJECTION INTRAMUSCULAR; INTRAVENOUS at 09:12

## 2022-05-16 RX ADMIN — LORAZEPAM 1 MG: 2 INJECTION INTRAMUSCULAR; INTRAVENOUS at 17:16

## 2022-05-16 RX ADMIN — Medication 10 ML: at 20:32

## 2022-05-16 RX ADMIN — HYDROCODONE BITARTRATE AND ACETAMINOPHEN 1 TABLET: 7.5; 325 TABLET ORAL at 17:03

## 2022-05-16 RX ADMIN — CLONIDINE HYDROCHLORIDE 0.1 MG: 0.1 TABLET ORAL at 06:15

## 2022-05-16 RX ADMIN — NICOTINE 1 PATCH: 21 PATCH, EXTENDED RELEASE TRANSDERMAL at 08:48

## 2022-05-16 RX ADMIN — HYDROMORPHONE HYDROCHLORIDE 0.5 MG: 1 INJECTION, SOLUTION INTRAMUSCULAR; INTRAVENOUS; SUBCUTANEOUS at 01:16

## 2022-05-16 RX ADMIN — HYDROMORPHONE HYDROCHLORIDE 0.5 MG: 1 INJECTION, SOLUTION INTRAMUSCULAR; INTRAVENOUS; SUBCUTANEOUS at 11:32

## 2022-05-16 RX ADMIN — ENOXAPARIN SODIUM 40 MG: 100 INJECTION SUBCUTANEOUS at 20:31

## 2022-05-16 RX ADMIN — HYDROMORPHONE HYDROCHLORIDE 0.5 MG: 1 INJECTION, SOLUTION INTRAMUSCULAR; INTRAVENOUS; SUBCUTANEOUS at 06:15

## 2022-05-16 RX ADMIN — MAGNESIUM HYDROXIDE,ALUMINUM HYDROXICE,SIMETHICONE 15 ML: 240; 2400; 2400 SUSPENSION ORAL at 06:19

## 2022-05-16 RX ADMIN — HYDROCODONE BITARTRATE AND ACETAMINOPHEN 1 TABLET: 7.5; 325 TABLET ORAL at 03:33

## 2022-05-16 RX ADMIN — HYDROMORPHONE HYDROCHLORIDE 0.5 MG: 1 INJECTION, SOLUTION INTRAMUSCULAR; INTRAVENOUS; SUBCUTANEOUS at 20:31

## 2022-05-16 RX ADMIN — LORAZEPAM 1 MG: 1 TABLET ORAL at 03:33

## 2022-05-16 RX ADMIN — Medication 10 ML: at 08:49

## 2022-05-16 NOTE — CONSULTS
"47 yo male evaluated in room 535 for alcoholism. Patient was admitted for management of intractable low back pain and further evaluation. MRI showed DDD. Patient started showing symptoms of alcohol withdrawal after 48 hours in hospital and starting to hallucinate, became agitated. Required restraints and significant amount of ativan. Required transfer to ICU and precedex drip. Patient moved from critical care to 5N today. Patient receiving scheduled ativan and CIWA monitoring. Medrol dose pack started and gabapentin. Receiving prn norco and dilaudid for pain as well as lidoderm patches.     Patient appears alert and oriented times 4. Calm, cooperative. Easy to engage. States he works at a [employer] and came home from work and got out of his car and \"felt a pinch\" in his back. States he went inside his home and drank a \"few brewskies\" and fell asleep. States he couldn't move the next morning and called for help. States he had a neck injury in the past that felt the same way. Patient states pain is better but continue to c/o back pain. States he can't walk independently. States he can move his legs a little but is afraid he will fall; instructed patient to use his call light if he needs to get out of bed.     Patient lives with a roommate. States he is  with 3 children. States 2 are adult but youngest is 14yo; states \"she lived with me for the first 5 years\". Patient works at a car wash.     States he received tx at [facility] 4-5 years ago for alcohol abuse and was there 4-5 days. Denies history of psychiatric tx. Asked about history of SI or suicide attempts and patient responds \"hell no. Nothing is worth that\".     Patient states he drinks 6 beers a day and sometimes liquor with coca cola. States he will buy a pint of liquor and \"that will last awhile\". Patient states he gets home from work and drinks and is always in bed by 9pm. States he always gets up and goes to work. Patient states he does not believe " that alcohol is a problem for him. States he always goes to work and denies any issues. Patient states his problem is his back pain.     Access will follow and I left a message for Toshia Barton, CD therapist to see patient.

## 2022-05-16 NOTE — PROGRESS NOTES
Name: Bogdan Jeter ADMIT: 2022   : 1975  PCP: Provider, No Known    MRN: [id number] LOS: 7 days   AGE/SEX: 46 y.o. male  ROOM: [facility]   Subjective   Chief Complaint   Patient presents with   • Back Pain     Mr. Jeter is a 46 year old male who presented to the hospital with complaints of back pain. Hospital course complicated by severe alcohol w/d.     Severe alcohol w/d requiring high doses of ativan as well as transfer to the ICU and precedex    Off precedex  Hallucinations resolved  Transferred out of ICU  +back pain and leg pain    ROS  No f/c  No n/v  No cp/palp  -soa/cough    Objective   Vital Signs  Temp:  [97.6 °F (36.4 °C)-99 °F (37.2 °C)] 98.5 °F (36.9 °C)  Heart Rate:  [111-137] 112  Resp:  [16-18] 18  BP: (109-153)/() 149/112  SpO2:  [93 %-100 %] 96 %  on   ;   Device (Oxygen Therapy): room air  Body mass index is 24.09 kg/m².    Physical Exam  Constitutional:       General: He is in acute distress.      Appearance: He is ill-appearing.   HENT:      Head: Normocephalic and atraumatic.   Eyes:      General: No scleral icterus.  Cardiovascular:      Rate and Rhythm: Regular rhythm. Tachycardia present.      Heart sounds: Normal heart sounds.   Pulmonary:      Effort: No respiratory distress (slight, decreased bs at bases).      Breath sounds: Normal breath sounds.   Abdominal:      General: There is no distension.      Palpations: Abdomen is soft.      Tenderness: There is no abdominal tenderness.   Musculoskeletal:      Cervical back: Neck supple.     awake       Results Review:       I reviewed the patient's new clinical results.  Results from last 7 days   Lab Units 22  0343 22  0414 22  0322 22  0559   WBC 10*3/mm3 8.54 7.18 9.17 7.28   HEMOGLOBIN g/dL 14.5 13.5 14.0 14.3   PLATELETS 10*3/mm3 205 199 203 211     Results from last 7 days   Lab Units 05/15/22  0430 22  0343 22  0519 22  0414   SODIUM mmol/L 135* 135* 135* 134*   POTASSIUM  mmol/L 4.5 4.1 4.7 5.4*   CHLORIDE mmol/L 100 101 104 105   CO2 mmol/L 22.0 21.0* 22.0 21.0*   BUN mg/dL 8 5* 4* 3*   CREATININE mg/dL 0.59* 0.74* 0.55* 0.53*   GLUCOSE mg/dL 91 117* 174* 365*   Estimated Creatinine Clearance: 171.6 mL/min (A) (by C-G formula based on SCr of 0.59 mg/dL (L)).  Results from last 7 days   Lab Units 05/13/22  0414 05/12/22  0322 05/11/22  0559 05/10/22  0459   ALBUMIN g/dL 3.00* 3.40* 3.30* 3.20*   BILIRUBIN mg/dL 1.0 0.9 0.9 1.3*   ALK PHOS U/L 103 122* 119* 126*   AST (SGOT) U/L 28 42* 50* 69*   ALT (SGPT) U/L 23 29 33 44*     Results from last 7 days   Lab Units 05/15/22  0430 05/14/22  0343 05/13/22 0519 05/13/22 0414 05/12/22 0322 05/11/22 0559 05/10/22  0459   CALCIUM mg/dL 9.5 9.4 9.0 8.3* 8.9 8.6 8.2*   ALBUMIN g/dL  --   --   --  3.00* 3.40* 3.30* 3.20*   MAGNESIUM mg/dL 1.7  --   --   --  1.8  --   --    PHOSPHORUS mg/dL 5.0* 3.7  --  3.7 3.6  --   --      Results from last 7 days   Lab Units 05/14/22  0343   PROCALCITONIN ng/mL 0.16       Coag     HbA1C No results found for: HGBA1C  Infection   Results from last 7 days   Lab Units 05/14/22  0343   PROCALCITONIN ng/mL 0.16     Radiology(recent) No radiology results for the last day  No results found for: TROPONINT, TROPONINI, BNP  No components found for: TSH;2    cloNIDine, 0.1 mg, Oral, Q8H  enoxaparin, 40 mg, Subcutaneous, Nightly  folic acid (FOLVITE) IVPB, 1 mg, Intravenous, Daily  gabapentin, 100 mg, Oral, Q12H  lidocaine, 2 patch, Transdermal, Q24H  LORazepam, 1 mg, Intravenous, Q8H  [START ON 5/17/2022] methylPREDNISolone, 4 mg, Oral, TID Around Food  [START ON 5/18/2022] methylPREDNISolone, 4 mg, Oral, Before Breakfast  [START ON 5/18/2022] methylPREDNISolone, 4 mg, Oral, Tonight  [START ON 5/19/2022] methylPREDNISolone, 4 mg, Oral, Before Breakfast  nicotine, 1 patch, Transdermal, Q24H  sodium chloride, 10 mL, Intravenous, Q12H       Diet Regular      Assessment & Plan      Active Hospital Problems    Diagnosis   POA   • **Intractable back pain [M54.9]  Yes   • Hypokalemia [E87.6]  Yes   • Tobacco abuse [Z72.0]  Yes   • Alcohol withdrawal (HCC) [F10.239]  Yes   • Heart burn [R12]  Yes      Resolved Hospital Problems   No resolved problems to display.     Mr. Jeter is a 46 year old male who presented to the hospital with complaints of back pain. Hospital course complicated by severe alcohol w/d.     · Severe alcohol w/d requiring high doses of ativan as well as transfer to the ICU and precedex. Off precedex now. Continue as needed ativan based on ciwa protocol.    · Intractable back pain: MRI now returned with some DDD but no acute fracture/compression/disc herniation. L4-5 with shortened pedicles and mild central canal stenosis. Nothing surgical it seems. Orthopedic surgery was consulted- they agree with supportive care with pain meds, etc. PT evaluation. Medrol dose pack started as well as gabapentin  · Nicotine patch  · Monitor electrolytes  · Monitor BG      Thanks to VLADISLAV/Dr. Mercedes/Dr. Ferguson for care in ICU    Dispo- He seems to be through the w/d. Doing well but still weak and poor mobility related to back issues probably will need subacute rehab stay    SHAKEEL RN  SHAKEEL CCP      Cristian Lopez MD  [facility]  05/16/22  13:24 EDT

## 2022-05-16 NOTE — PLAN OF CARE
Pt admitted to [facility] 2/2 to a pinch in his back causing falls and weakness at home. Pt diagnosed with L4-5 vertebral bodies but not concerning for significant injury as well as ETOH withdrawal. Pmhx includes HTN and tobacco use. 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 He does not have family support and reports independence with all ADL's and mobility. Today, the pt is oriented x3 with generalized confusion present. Min A for bed mobility. CGA->Mod A for sitting balance at the EOB due to poor trunk control and weakness. Max A for LBD due to his impaired sitting balance and coordination. Ataxic movements in BUE's present. Max A x2 to stand with knee buckling present in both legs. He is very unsteady and unsafe to attempt further mobility/transfers/OOB ADL's at this time. SNF recommended pending progress.     Patient was not wearing a face mask during this therapy encounter. Therapist used appropriate personal protective equipment including mask and gloves.  Mask used was standard procedure mask. Appropriate PPE was worn during the entire therapy session. Hand hygiene was completed before and after therapy session. Patient is not in enhanced droplet precautions.

## 2022-05-16 NOTE — PAYOR COMM NOTE
"Bogdan Pearson (46 y.o. Male)     PLEASE SEE ATTACHED FOR CONTINUED STAY REVIEW.     REF#[id number]    PLEASE CALL  OR  [phone]    THANK YOU    BRIANNA SEWELL LPN CCP            Date of Birth   1975    Social Security Number       Address   [address] [place], [place] [address]    Home Phone   [phone]    MRN   [id number]       Yarsanism   None    Marital Status   Single                            Admission Date   5/9/22    Admission Type   Emergency    Admitting Provider   Cony Damian MD    Attending Provider   Cristian Lopez MD    Department, Room/Bed   [address], [address]/1       Discharge Date       Discharge Disposition       Discharge Destination                               Attending Provider: Cristian Lopez MD    Allergies: No Known Allergies    Isolation: None   Infection: None   Code Status: CPR   Advance Care Planning Activity    Ht: 180.3 cm (71\")   Wt: 78.4 kg (172 lb 12 oz)    Admission Cmt: None   Principal Problem: Intractable back pain [M54.9]                 Active Insurance as of 5/9/2022     Primary Coverage     Payor Plan Insurance Group Employer/Plan Group    [facility] BY [facility]AM[facility]LL [id number]     Payor Plan Address Payor Plan Phone Number Payor Plan Fax Number Effective Dates    [address]   1/1/2021 - None Entered    [place] [id number]       Subscriber Name Subscriber Birth Date Member ID       BOGDAN PEARSON 1975 [id number]                 Emergency Contacts      (Rel.) Home Phone Work Phone Mobile Phone    KRYSTAL WEBSTER (Father) [phone] -- [phone]            Oxygen Therapy (last day)     Date/Time SpO2 Device (Oxygen Therapy) Flow (L/min) Oxygen Concentration (%) ETCO2 (mmHg)    05/16/22 1300 94 -- -- -- --    05/16/22 0710 96 room air -- -- --    05/16/22 0340 96 -- -- -- --    05/16/22 0338 97 -- -- -- --    05/15/22 2308 94 -- -- -- --    05/15/22 1905 98 -- " -- -- --    05/15/22 1803 98 -- -- -- --    05/15/22 1541 -- room air -- -- --    05/15/22 1500 93 room air -- -- --    05/15/22 1400 98 -- -- -- --    05/15/22 1300 94 -- -- -- --    05/15/22 1200 98 -- -- -- --    05/15/22 1100 100 -- -- -- --    05/15/22 1000 99 -- -- -- --    05/15/22 0700 99 room air -- -- --    05/15/22 0600 100 -- -- -- --    05/15/22 0500 98 -- -- -- --    05/15/22 0400 97 room air -- -- --    05/15/22 0300 97 -- -- -- --    05/15/22 0200 94 -- -- -- --    05/15/22 0100 99 -- -- -- --    05/15/22 0000 98 room air -- -- --          Intake & Output (last day)       05/15 0701   0700  0701   0700    P.O. 210     IV Piggyback  50    Total Intake(mL/kg) 210 (2.7) 50 (0.6)    Urine (mL/kg/hr) 250 (0.1)     Total Output 250     Net -40 +50          Urine Unmeasured Occurrence 1 x 1 x        Lines, Drains & Airways     Active LDAs     Name Placement date Placement time Site Days    Peripheral IV 05/15/22 1943 Right Forearm 05/15/22  1943  Forearm  less than 1                  Operative/Procedure Notes (last 24 hours)  Notes from 05/15/22 1337 through 22 1337   No notes of this type exist for this encounter.            Physician Progress Notes (last 24 hours)      Cristian Lopez MD at 22 1044              Name: Bogadn Jeter ADMIT: 2022   : 1975  PCP: Provider, No Known    MRN: [id number] LOS: 7 days   AGE/SEX: 46 y.o. male  ROOM: [facility]   Subjective   Chief Complaint   Patient presents with   • Back Pain     Mr. Jeter is a 46 year old male who presented to the hospital with complaints of back pain. Hospital course complicated by severe alcohol w/d.     Severe alcohol w/d requiring high doses of ativan as well as transfer to the ICU and precedex    Off precedex  Hallucinations resolved  Transferred out of ICU  +back pain and leg pain    ROS  No f/c  No n/v  No cp/palp  -soa/cough    Objective   Vital Signs  Temp:  [97.6 °F (36.4 °C)-99 °F (37.2 °C)]  98.5 °F (36.9 °C)  Heart Rate:  [111-137] 112  Resp:  [16-18] 18  BP: (109-153)/() 149/112  SpO2:  [93 %-100 %] 96 %  on   ;   Device (Oxygen Therapy): room air  Body mass index is 24.09 kg/m².    Physical Exam  Constitutional:       General: He is in acute distress.      Appearance: He is ill-appearing.   HENT:      Head: Normocephalic and atraumatic.   Eyes:      General: No scleral icterus.  Cardiovascular:      Rate and Rhythm: Regular rhythm. Tachycardia present.      Heart sounds: Normal heart sounds.   Pulmonary:      Effort: No respiratory distress (slight, decreased bs at bases).      Breath sounds: Normal breath sounds.   Abdominal:      General: There is no distension.      Palpations: Abdomen is soft.      Tenderness: There is no abdominal tenderness.   Musculoskeletal:      Cervical back: Neck supple.     awake       Results Review:       I reviewed the patient's new clinical results.  Results from last 7 days   Lab Units 05/14/22  0343 05/13/22  0414 05/12/22  0322 05/11/22  0559   WBC 10*3/mm3 8.54 7.18 9.17 7.28   HEMOGLOBIN g/dL 14.5 13.5 14.0 14.3   PLATELETS 10*3/mm3 205 199 203 211     Results from last 7 days   Lab Units 05/15/22  0430 05/14/22  0343 05/13/22  0519 05/13/22  0414   SODIUM mmol/L 135* 135* 135* 134*   POTASSIUM mmol/L 4.5 4.1 4.7 5.4*   CHLORIDE mmol/L 100 101 104 105   CO2 mmol/L 22.0 21.0* 22.0 21.0*   BUN mg/dL 8 5* 4* 3*   CREATININE mg/dL 0.59* 0.74* 0.55* 0.53*   GLUCOSE mg/dL 91 117* 174* 365*   Estimated Creatinine Clearance: 171.6 mL/min (A) (by C-G formula based on SCr of 0.59 mg/dL (L)).  Results from last 7 days   Lab Units 05/13/22 0414 05/12/22 0322 05/11/22  0559 05/10/22  0459   ALBUMIN g/dL 3.00* 3.40* 3.30* 3.20*   BILIRUBIN mg/dL 1.0 0.9 0.9 1.3*   ALK PHOS U/L 103 122* 119* 126*   AST (SGOT) U/L 28 42* 50* 69*   ALT (SGPT) U/L 23 29 33 44*     Results from last 7 days   Lab Units 05/15/22  0430 05/14/22  0343 05/13/22  0519 05/13/22  0414  05/12/22  0322 05/11/22  0559 05/10/22  0459   CALCIUM mg/dL 9.5 9.4 9.0 8.3* 8.9 8.6 8.2*   ALBUMIN g/dL  --   --   --  3.00* 3.40* 3.30* 3.20*   MAGNESIUM mg/dL 1.7  --   --   --  1.8  --   --    PHOSPHORUS mg/dL 5.0* 3.7  --  3.7 3.6  --   --      Results from last 7 days   Lab Units 05/14/22  0343   PROCALCITONIN ng/mL 0.16       Coag     HbA1C No results found for: HGBA1C  Infection   Results from last 7 days   Lab Units 05/14/22  0343   PROCALCITONIN ng/mL 0.16     Radiology(recent) No radiology results for the last day  No results found for: TROPONINT, TROPONINI, BNP  No components found for: TSH;2    cloNIDine, 0.1 mg, Oral, Q8H  enoxaparin, 40 mg, Subcutaneous, Nightly  folic acid (FOLVITE) IVPB, 1 mg, Intravenous, Daily  gabapentin, 100 mg, Oral, Q12H  lidocaine, 2 patch, Transdermal, Q24H  LORazepam, 1 mg, Intravenous, Q8H  [START ON 5/17/2022] methylPREDNISolone, 4 mg, Oral, TID Around Food  [START ON 5/18/2022] methylPREDNISolone, 4 mg, Oral, Before Breakfast  [START ON 5/18/2022] methylPREDNISolone, 4 mg, Oral, Tonight  [START ON 5/19/2022] methylPREDNISolone, 4 mg, Oral, Before Breakfast  nicotine, 1 patch, Transdermal, Q24H  sodium chloride, 10 mL, Intravenous, Q12H       Diet Regular      Assessment & Plan     Active Hospital Problems    Diagnosis  POA   • **Intractable back pain [M54.9]  Yes   • Hypokalemia [E87.6]  Yes   • Tobacco abuse [Z72.0]  Yes   • Alcohol withdrawal (HCC) [F10.239]  Yes   • Heart burn [R12]  Yes      Resolved Hospital Problems   No resolved problems to display.     Mr. Jeter is a 46 year old male who presented to the hospital with complaints of back pain. Hospital course complicated by severe alcohol w/d.     · Severe alcohol w/d requiring high doses of ativan as well as transfer to the ICU and precedex. Off precedex now. Continue as needed ativan based on ciwa protocol.    · Intractable back pain: MRI now returned with some DDD but no acute fracture/compression/disc  herniation. L4-5 with shortened pedicles and mild central canal stenosis. Nothing surgical it seems. Orthopedic surgery was consulted- they agree with supportive care with pain meds, etc. PT evaluation. Medrol dose pack started as well as gabapentin  · Nicotine patch  · Monitor electrolytes  · Monitor BG      Thanks to VLADISLAV/Dr. Mercedes/Dr. Ferguson for care in ICU    Dispo- He seems to be through the w/d. Doing well but still weak and poor mobility related to back issues probably will need subacute rehab stay    DW RN  DW CCP      Cristian Lopez MD  [facility]  05/16/22  13:24 EDT    Electronically signed by Cristian Lopez MD at 05/16/22 6171

## 2022-05-16 NOTE — THERAPY EVALUATION
Patient Name: Bogdan Jeter  : 1975    MRN: [id number]                              Today's Date: 2022       Admit Date: 2022    Visit Dx:     ICD-10-CM ICD-9-CM   1. Intractable back pain  M54.9 724.5   2. Immobility  Z74.09 799.89     Patient Active Problem List   Diagnosis   • Intractable back pain   • Hypokalemia   • Tobacco abuse   • Alcohol withdrawal (HCC)   • Heart burn     No past medical history on file.  No past surgical history on file.   General Information     Row Name 22 1324          Physical Therapy Time and Intention    Document Type evaluation  -CF     Mode of Treatment co-treatment;physical therapy  -CF     Row Name 22 1324          General Information    Patient Profile Reviewed yes  -CF     Prior Level of Function independent:;transfer;bed mobility;all household mobility  working and driving  -CF     Existing Precautions/Restrictions fall;seizures  CIWA  -CF     Barriers to Rehab medically complex  -CF     Row Name 22 1324          Living Environment    People in Home friend(s)  -CF     Row Name 22 [facility]          Home Main Entrance    Number of Stairs, Main Entrance none  -CF     Row Name 22 1324          Stairs Within Home, Primary    Number of Stairs, Within Home, Primary twelve  -CF     Row Name 22 1324          Cognition    Orientation Status (Cognition) oriented to;person;place;time  -CF     Row Name 22 1324          Safety Issues, Functional Mobility    Safety Issues Affecting Function (Mobility) judgment;problem-solving;impulsivity;insight into deficits/self-awareness;safety precautions follow-through/compliance;awareness of need for assistance;sequencing abilities  -CF     Impairments Affecting Function (Mobility) balance;cognition;coordination;endurance/activity tolerance;motor control;postural/trunk control;strength;pain  -CF           User Key  (r) = Recorded By, (t) = Taken By, (c) = Cosigned By    Initials Name Provider  Type    CF Blanca Toledo, NICK Physical Therapist               Mobility     Row Name 05/16/22 1325          Bed Mobility    Bed Mobility supine-sit;sit-supine  -CF     Supine-Sit [place] (Bed Mobility) minimum assist (75% patient effort);verbal cues;nonverbal cues (demo/gesture)  -CF     Sit-Supine [place] (Bed Mobility) minimum assist (75% patient effort);verbal cues;nonverbal cues (demo/gesture)  -     Assistive Device (Bed Mobility) bed rails;head of bed elevated  -[facility] Name 05/16/22 1325          Bed-Chair Transfer    Bed-Chair [place] (Transfers) unable to assess  -[facility] Name 05/16/22 1325          Sit-Stand Transfer    Sit-Stand [place] (Transfers) maximum assist (25% patient effort);2 person assist;nonverbal cues (demo/gesture);verbal cues  -     Assistive Device (Sit-Stand Transfers) other (see comments)  hha x2  -     Comment, (Sit-Stand Transfer) B knees buckling with static standing, STS x2 reps  -CF     Row Name 05/16/22 1325          Gait/Stairs (Locomotion)    [place] Level (Gait) unable to assess  -           User Key  (r) = Recorded By, (t) = Taken By, (c) = Cosigned By    Initials Name Provider Type     Blanca Toledo PT Physical Therapist               Obj/Interventions     [facility] Name 05/16/22 1326          Range of Motion Comprehensive    General Range of Motion bilateral lower extremity ROM WFL  -CF     Comment, General Range of Motion except L hip due to pain  -[facility] Name 05/16/22 1326          Strength Comprehensive (MMT)    Comment, General Manual Muscle Testing (MMT) Assessment generalized BLE weakness  -[facility] Name 05/16/22 1326          Balance    Balance Assessment sitting static balance;sitting dynamic balance;standing static balance;standing dynamic balance  -CF     Static Sitting Balance moderate assist;verbal cues;non-verbal cues (demo/gesture)  -CF     Dynamic Sitting Balance moderate assist;maximum assist;verbal  cues;non-verbal cues (demo/gesture)  -CF     Position, Sitting Balance sitting edge of bed  -CF     Static Standing Balance maximum assist;2-person assist  -CF     Position/Device Used, Standing Balance supported;other (see comments)  hha x2  -CF     Comment, Balance unsteady on his feet, B knees buckling. variable sitting balance varying from cga to mod A with L lateral lean  -CF     Row Name 05/16/22 1324          Sensory Assessment (Somatosensory)    Sensory Assessment (Somatosensory) sensation intact  -CF           User Key  (r) = Recorded By, (t) = Taken By, (c) = Cosigned By    Initials Name Provider Type    CF Blanca Toledo, PT Physical Therapist               Goals/Plan     Row Name 05/16/22 1332          Bed Mobility Goal 1 (PT)    Activity/Assistive Device (Bed Mobility Goal 1, PT) bed mobility activities, all  -CF     [place] Level/Cues Needed (Bed Mobility Goal 1, PT) supervision required  -CF     Time Frame (Bed Mobility Goal 1, PT) 2 weeks  -CF     Row Name 05/16/22 1332          Transfer Goal 1 (PT)    Activity/Assistive Device (Transfer Goal 1, PT) sit-to-stand/stand-to-sit;bed-to-chair/chair-to-bed  -CF     [place] Level/Cues Needed (Transfer Goal 1, PT) supervision required  -CF     Time Frame (Transfer Goal 1, PT) 2 weeks  -     Row Name 05/16/22 1332          Gait Training Goal 1 (PT)    Activity/Assistive Device (Gait Training Goal 1, PT) gait (walking locomotion)  LRAD  -CF     [place] Level (Gait Training Goal 1, PT) minimum assist (75% or more patient effort)  -CF     Distance (Gait Training Goal 1, PT) 80'  -CF     Time Frame (Gait Training Goal 1, PT) 2 weeks  -CF     Row Name 05/16/22 9709          Therapy Assessment/Plan (PT)    Planned Therapy Interventions (PT) balance training;bed mobility training;gait training;ROM (range of motion);strengthening;patient/family education;transfer training  -CF           User Key  (r) = Recorded By, (t) = Taken By, (c) = Cosigned  By    Initials Name Provider Type    CF Blanca Toledo, PT Physical Therapist               Clinical Impression     Row Name 05/16/22 1328          Pain    Pretreatment Pain Rating 3/10  -CF     Posttreatment Pain Rating 5/10  -CF     Pain Location lower  -CF     Pain Location - back;hip  -CF     Pre/Posttreatment Pain Comment L posterior hip/back  -CF     Pain Intervention(s) Medication (See MAR);Repositioned;Ambulation/increased activity;Rest  -CF     Row Name 05/16/22 1328          Plan of Care Review    Plan of Care Reviewed With patient  -CF     Outcome Evaluation Pt admitted from home with intractable back pain and generalized weakness. Pt with hx of chronic alcohol use and began experiencing withdrawal symptoms while present in the hospital. 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  Pt was generally confused during eval and demonstrates severe balance deficits, generalized weakness, poor coordination and sequencing and decreased activity tolerance. Pt required min A for bed mobility, variable assist for sitting balance, max A x2 for sit<>stand and standing balance due to B knee buckling. At this Current mobility level, pt is not safe to return home- recommending SNF pending progress.  -     Row Name 05/16/22 1328          Therapy Assessment/Plan (PT)    Rehab Potential (PT) good, to achieve stated therapy goals  -     Criteria for Skilled Interventions Met (PT) yes  -CF     Therapy Frequency (PT) 6 times/wk  -     Row Name 05/16/22 1328          Vital Signs    O2 Delivery Pre Treatment room air  -CF     O2 Delivery Intra Treatment room air  -CF     O2 Delivery Post Treatment room air  -CF     Row Name 05/16/22 1328          Positioning and Restraints    In Bed notified nsg;call light within reach;encouraged to call for assist;exit alarm on;legs elevated;fowlers  -           User Key  (r) = Recorded By,  (t) = Taken By, (c) = Cosigned By    Initials Name Provider Type    CF Blanca Toledo, PT Physical Therapist               Outcome Measures     Row Name 05/16/22 1333          How much help from another person do you currently need...    Turning from your back to your side while in flat bed without using bedrails? 3  -CF     Moving from lying on back to sitting on the side of a flat bed without bedrails? 3  -CF     Moving to and from a bed to a chair (including a wheelchair)? 1  -CF     Standing up from a chair using your arms (e.g., wheelchair, bedside chair)? 2  -CF     Climbing 3-5 steps with a railing? 1  -CF     To walk in hospital room? 1  -CF     AM-PAC 6 Clicks Score (PT) 11  -CF     Highest level of mobility 4 --> Transferred to chair/commode  -CF     Row Name 05/16/22 1033          Modified [place] Scale    Modified [place] Scale 4 - Moderately severe disability.  Unable to walk without assistance, and unable to attend to own bodily needs without assistance.  -RB     Row Name 05/16/22 1333 05/16/22 1033       Functional Assessment    Outcome Measure Options AM-PAC 6 Clicks Basic Mobility (PT)  -CF AM-PAC 6 Clicks Daily Activity (OT);Modified [place]  -RB          User Key  (r) = Recorded By, (t) = Taken By, (c) = Cosigned By    Initials Name Provider Type    Sobeida Faust OT Occupational Therapist    Blanca Chavira, PT Physical Therapist                             Physical Therapy Education                 Title: PT OT SLP Therapies (In Progress)     Topic: Physical Therapy (In Progress)     Point: Mobility training (In Progress)     Learning Progress Summary           Patient Acceptance, E, NR by  at 5/16/2022 1334                   Point: Home exercise program (Not Started)     Learner Progress:  Not documented in this visit.          Point: Body mechanics (In Progress)     Learning Progress Summary           Patient Acceptance, E, NR by  at 5/16/2022 1334                   Point:  Precautions (In Progress)     Learning Progress Summary           Patient Acceptance, E, NR by CF at 5/16/2022 0154                               User Key     Initials Effective Dates Name Provider Type Discipline    CF 06/16/21 -  Blanca Toledo, PT Physical Therapist PT              PT Recommendation and Plan  Planned Therapy Interventions (PT): balance training, bed mobility training, gait training, ROM (range of motion), strengthening, patient/family education, transfer training  Plan of Care Reviewed With: patient  Outcome Evaluation: Pt admitted from home with intractable back pain and generalized weakness. Pt with hx of chronic alcohol use and began experiencing withdrawal symptoms while present in the hospital. 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  Pt was generally confused during eval and demonstrates severe balance deficits, generalized weakness, poor coordination and sequencing and decreased activity tolerance. Pt required min A for bed mobility, variable assist for sitting balance, max A x2 for sit<>stand and standing balance due to B knee buckling. At this Current mobility level, pt is not safe to return home- recommending SNF pending progress.     Time Calculation:    PT Charges     Row Name 05/16/22 1323             Time Calculation    Start Time 0842  -CF      Stop Time 0907  -CF      Time Calculation (min) 25 min  -CF      PT Received On 05/16/22  -CF      PT - Next Appointment 05/17/22  -      PT Goal Re-Cert Due Date 05/30/22  -CF              Time Calculation- PT    Total Timed Code Minutes- PT 15 minute(s)  -CF              Timed Charges    09657 - PT Therapeutic Activity Minutes 15  -CF              Total Minutes    Timed Charges Total Minutes 15  -CF       Total Minutes 15  -CF            User Key  (r) = Recorded By, (t) = Taken By, (c) = Cosigned By    Initials Name Provider Type    CF  Blanca Toledo, PT Physical Therapist              Therapy Charges for Today     Code Description Service Date Service Provider Modifiers Qty    [id number]  PT EVAL MOD COMPLEXITY 3 5/16/2022 Blanca Toledo, PT GP 1    [id number]  PT THERAPEUTIC ACT EA 15 MIN 5/16/2022 Blanca Toledo, PT GP 1          PT G-Codes  Outcome Measure Options: AM-PAC 6 Clicks Basic Mobility (PT)  AM-PAC 6 Clicks Score (PT): 11  AM-PAC 6 Clicks Score (OT): 14  Modified [place] Scale: 4 - Moderately severe disability.  Unable to walk without assistance, and unable to attend to own bodily needs without assistance.    Blanca Toledo, NICK  5/16/2022

## 2022-05-16 NOTE — PLAN OF CARE
Goal Outcome Evaluation:  Plan of Care Reviewed With: patient           Outcome Evaluation: Pt admitted from home with intractable back pain and generalized weakness. Pt with hx of chronic alcohol use and began experiencing withdrawal symptoms while present in the hospital. 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  Pt was generally confused during eval and demonstrates severe balance deficits, generalized weakness, poor coordination and sequencing and decreased activity tolerance. Pt required min A for bed mobility, variable assist for sitting balance, max A x2 for sit<>stand and standing balance due to B knee buckling. At this Current mobility level, pt is not safe to return home- recommending SNF pending progress.

## 2022-05-16 NOTE — PROGRESS NOTES
"Access f/u:  Reviewed chart and interviewed pt.  Introduced self as LIU therapist and reason for visit was to help.  Pt in bed with his eyes closed. Pt easily awakened and responded to this therapist. Pt states his father \"was an alcoholic who drank daily and smoked 2 packs of cigarettes\". \"He just quit one day cold turkey\" Pt states he wants to stop drinking.  Educated LIU is disease of the brain and his fathers experience is not the norm. LIU treatment predominately necessary. Otherwise individuals can be detoxified and return home and it is as if they were never detoxified. Pt states he does not believe his drinking is too bad. Educated pt that he is currently being detoxified to prevent any life threatening problems such as a seizure.  Pt disclosed he drinks every night after work \"some beers and liquor with coke too. He has been drinking daily for the last year. Pt worried about his job at [employer]. Pt states he has worked there over a year and needs to get back to work. \" I just want to get out of here and back to work\"  Motivational interviewing to help pt consider LIU treatment. Gave pt all area resources for LIU. Gave pt written information how to acces  [facility], [facility], [facility], and [facility]. Pt disclosed he had been to LIU treatment 1-2 years ago.  Pt agreeable to consider LIU treatment and be followed  "

## 2022-05-16 NOTE — THERAPY EVALUATION
"Patient Name: Bogdan Jeter  : 1975    MRN: [id number]                              Today's Date: 2022       Admit Date: 2022    Visit Dx:     ICD-10-CM ICD-9-CM   1. Intractable back pain  M54.9 724.5   2. Immobility  Z74.09 799.89     Patient Active Problem List   Diagnosis   • Intractable back pain   • Hypokalemia   • Tobacco abuse   • Alcohol withdrawal (HCC)   • Heart burn     No past medical history on file.  No past surgical history on file.   General Information     Row Name 22 1027          OT Time and Intention    Document Type evaluation  -RB     Mode of Treatment co-treatment;physical therapy;occupational therapy  + falls and weakness  -RB     Row Name 22 1027          General Information    Prior Level of Function independent:;ADL's;transfer  -RB     Existing Precautions/Restrictions fall;other (see comments);seizures  CIWA  -RB     Barriers to Rehab medically complex  CIWA  -RB     Row Name 22 1027          Living Environment    People in Home friend(s)  -RB     Row Name 22 1027          Home Main Entrance    Number of Stairs, Main Entrance none  -RB     Row Name 22 1027          Stairs Within Home, Primary    Number of Stairs, Within Home, Primary twelve  -RB     Row Name 22 1027          Cognition    Orientation Status (Cognition) oriented to;person;place;time  Reports he is here for \"a broken back\"  -RB     Row Name 22 1027          Safety Issues, Functional Mobility    Safety Issues Affecting Function (Mobility) safety precautions follow-through/compliance;safety precaution awareness;awareness of need for assistance;insight into deficits/self-awareness;judgment;problem-solving;impulsivity;sequencing abilities  -RB     Impairments Affecting Function (Mobility) balance;cognition;coordination;endurance/activity tolerance;strength;pain;postural/trunk control;range of motion (ROM);motor control  -RB           User Key  (r) = Recorded By, (t) = " Taken By, (c) = Cosigned By    Initials Name Provider Type    RB Sobeida Bustos OT Occupational Therapist                 Mobility/ADL's     Row Name 05/16/22 1029          Bed Mobility    Bed Mobility supine-sit;sit-supine  -RB     Supine-Sit [place] (Bed Mobility) minimum assist (75% patient effort);verbal cues;nonverbal cues (demo/gesture)  -RB     Sit-Supine [place] (Bed Mobility) minimum assist (75% patient effort);verbal cues;nonverbal cues (demo/gesture)  -RB     Assistive Device (Bed Mobility) bed rails  -RB     Row Name 05/16/22 1029          Transfers    Transfers sit-stand transfer;stand-sit transfer  -RB     Comment, (Transfers) knee buckling present in b/l legs, very unsteady. + dizziness  -RB     Sit-Stand [place] (Transfers) maximum assist (25% patient effort);2 person assist;nonverbal cues (demo/gesture);verbal cues  -RB     Stand-Sit [place] (Transfers) maximum assist (25% patient effort);nonverbal cues (demo/gesture);verbal cues;2 person assist  -     Row Name 05/16/22 1029          Sit-Stand Transfer    Assistive Device (Sit-Stand Transfers) --  hha  -     Row Name 05/16/22 1029          Stand-Sit Transfer    Assistive Device (Stand-Sit Transfers) --  a  -     Row Name 05/16/22 1029          Functional Mobility    Functional Mobility- Ind. Level not tested;unable to perform  -RB     Row Name 05/16/22 1033 05/16/22 1029       Activities of Daily Living    BADL Assessment/Intervention toileting  -RB lower body dressing;grooming  -    Row Name 05/16/22 1029          Lower Body Dressing Assessment/Training    [place] Level (Lower Body Dressing) lower body dressing skills;maximum assist (25% patient effort)  -RB     Position (Lower Body Dressing) edge of bed sitting  -RB     Comment, (Lower Body Dressing) impaired sitting balance  -RB     Row Name 05/16/22 1029          Grooming Assessment/Training    [place] Level (Grooming) grooming skills;set up  -RB      "Position (Grooming) edge of bed sitting  -RB     Row Name 05/16/22 1033          Toileting Assessment/Training    [place] Level (Toileting) toileting skills;moderate assist (50% patient effort)  -RB     Position (Toileting) supported sitting  -RB     Comment, (Toileting) urinal use - spilling on the floor. impaired coordination/attention  -RB           User Key  (r) = Recorded By, (t) = Taken By, (c) = Cosigned By    Initials Name Provider Type    RB Sobeida Bustos OT Occupational Therapist               Obj/Interventions     Row Name 05/16/22 1030          Sensory Assessment (Somatosensory)    Sensory Assessment (Somatosensory) sensation intact  -RB     Row Name 05/16/22 1030          Vision Assessment/Intervention    Visual Impairment/Limitations WFL  -RB     Row Name 05/16/22 1030          Range of Motion Comprehensive    Comment, General Range of Motion Impaired AROM in the LLE due to \"pain\", he is aprehensive about AROM in the hip  -RB     Row Name 05/16/22 1030          Strength Comprehensive (MMT)    Comment, General Manual Muscle Testing (MMT) Assessment Generalized weakness from multiple medical issues present  -RB     Row Name 05/16/22 1030          Balance    Balance Assessment sitting static balance;sitting dynamic balance;standing static balance;standing dynamic balance  -RB     Static Sitting Balance contact guard;minimal assist;moderate assist;maximum assist;non-verbal cues (demo/gesture);verbal cues  -RB     Dynamic Sitting Balance maximum assist;non-verbal cues (demo/gesture);verbal cues  -RB     Position, Sitting Balance sitting edge of bed  -RB     Static Standing Balance maximum assist;2-person assist;non-verbal cues (demo/gesture);verbal cues  -RB     Dynamic Standing Balance maximum assist;2-person assist;non-verbal cues (demo/gesture);verbal cues  -RB     Position/Device Used, Standing Balance walker, front-wheeled  -RB     Balance Interventions occupation based/functional task  -RB  "    Comment, Balance very unsteady at times. forward/L lateral lean with sitting balance and standing.  -RB           User Key  (r) = Recorded By, (t) = Taken By, (c) = Cosigned By    Initials Name Provider Type    RB Sobeida Bustos OT Occupational Therapist               Goals/Plan     Row Name 05/16/22 1032          Bed Mobility Goal 1 (OT)    Activity/Assistive Device (Bed Mobility Goal 1, OT) bed mobility activities, all  -RB     [place] Level/Cues Needed (Bed Mobility Goal 1, OT) supervision required  -RB     Time Frame (Bed Mobility Goal 1, OT) short term goal (STG);2 weeks  -RB     Progress/Outcomes (Bed Mobility Goal 1, OT) continuing progress toward goal  -RB     Row Name 05/16/22 1032          Transfer Goal 1 (OT)    Activity/Assistive Device (Transfer Goal 1, OT) transfers, all  -RB     [place] Level/Cues Needed (Transfer Goal 1, OT) supervision required  -RB     Time Frame (Transfer Goal 1, OT) short term goal (STG);2 weeks  -RB     Progress/Outcome (Transfer Goal 1, OT) continuing progress toward goal  -RB     Row Name 05/16/22 1032          Dressing Goal 1 (OT)    Activity/Device (Dressing Goal 1, OT) dressing skills, all  -RB     [place]/Cues Needed (Dressing Goal 1, OT) minimum assist (75% or more patient effort)  -RB     Time Frame (Dressing Goal 1, OT) short term goal (STG);2 weeks  -RB     Progress/Outcome (Dressing Goal 1, OT) continuing progress toward goal  -RB     Row Name 05/16/22 1032          Toileting Goal 1 (OT)    Activity/Device (Toileting Goal 1, OT) toileting skills, all  -RB     [place] Level/Cues Needed (Toileting Goal 1, OT) minimum assist (75% or more patient effort)  -RB     Time Frame (Toileting Goal 1, OT) short term goal (STG);2 weeks  -RB     Progress/Outcome (Toileting Goal 1, OT) continuing progress toward goal  -RB     Row Name 05/16/22 1032          Grooming Goal 1 (OT)    Activity/Device (Grooming Goal 1, OT) grooming skills, all  -RB      [place] (Grooming Goal 1, OT) supervision required  -RB     Time Frame (Grooming Goal 1, OT) short term goal (STG);2 weeks  -RB     Progress/Outcome (Grooming Goal 1, OT) continuing progress toward goal  -RB     Row Name 05/16/22 1032          Therapy Assessment/Plan (OT)    Planned Therapy Interventions (OT) transfer/mobility retraining;strengthening exercise;ROM/therapeutic exercise;activity tolerance training;adaptive equipment training;BADL retraining;functional balance retraining;occupation/activity based interventions;patient/caregiver education/training;cognitive/visual perception retraining;neuromuscular control/coordination retraining  -RB           User Key  (r) = Recorded By, (t) = Taken By, (c) = Cosigned By    Initials Name Provider Type    RB Sobeida Bustos OT Occupational Therapist               Clinical Impression     Row Name 05/16/22 1031          Pain Assessment    Pretreatment Pain Rating 3/10  -RB     Posttreatment Pain Rating 5/10  -RB     Pain Location lower  -RB     Pain Location - back  -RB     Pain Intervention(s) Medication (See MAR);Rest  -RB     Row Name 05/16/22 1034 05/16/22 1031       Plan of Care Review    Plan of Care Reviewed With -- patient  -RB    Progress -- no change  -RB    Outcome Evaluation Pt admitted to [facility] 2/2 to a pinch in his back causing falls and weakness at home. Pt diagnosed with L4-5 vertebral bodies but not concerning for significant injury as well as ETOH withdrawal. Pmhx includes HTN and tobacco use. 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 He does not have family support and reports independence with all ADL's and mobility. Today, the pt is oriented x3 with generalized confusion present. Min A for bed mobility. CGA->Mod A for sitting balance at the EOB due to poor trunk control and weakness. Max A for LBD due to his impaired sitting balance and coordination. Ataxic movements in BUE's present. Max A x2 to  stand with knee buckling present in both legs. He is very unsteady and unsafe to attempt further mobility/transfers/OOB ADL's at this time. SNF recommended pending progress.      Patient was not wearing a face mask during this therapy encounter. Therapist used appropriate personal protective equipment including mask and gloves.  Mask used was standard procedure mask. Appropriate PPE was worn during the entire therapy session. Hand hygiene was completed before and after therapy session. Patient is not in enhanced droplet precautions.  -RB --    Row Name 05/16/22 1031          Therapy Assessment/Plan (OT)    Rehab Potential (OT) good, to achieve stated therapy goals  -RB     Criteria for Skilled Therapeutic Interventions Met (OT) yes;no problems identified which require skilled intervention  -RB     Therapy Frequency (OT) 5 times/wk  -RB     Row Name 05/16/22 1031          Therapy Plan Review/Discharge Plan (OT)    Anticipated Discharge Disposition (OT) [facility] nursing facility  -RB     Row Name 05/16/22 1031          Vital Signs    O2 Delivery Pre Treatment room air  -RB     O2 Delivery Intra Treatment room air  -RB     O2 Delivery Post Treatment room air  -RB     Pre Patient Position Supine  -RB     Intra Patient Position Standing  -RB     Post Patient Position Supine  -RB     Row Name 05/16/22 1031          Positioning and Restraints    Pre-Treatment Position in bed  -RB     Post Treatment Position bed  -RB     In Bed notified nsg;supine;call light within reach;encouraged to call for assist;exit alarm on;legs elevated;heels elevated  -RB           User Key  (r) = Recorded By, (t) = Taken By, (c) = Cosigned By    Initials Name Provider Type    RB Sobeida Bustos, JHOAN Occupational Therapist               Outcome Measures     Row Name 05/16/22 1033          How much help from another is currently needed...    Putting on and taking off regular lower body clothing? 2  -RB     Bathing (including washing, rinsing, and  drying) 2  -RB     Toileting (which includes using toilet bed pan or urinal) 2  -RB     Putting on and taking off regular upper body clothing 2  -RB     Taking care of personal grooming (such as brushing teeth) 3  -RB     Eating meals 3  -RB     AM-PAC 6 Clicks Score (OT) 14  -RB     Row Name 05/16/22 1033          Modified Arcadia Scale    Modified Arcadia Scale 4 - Moderately severe disability.  Unable to walk without assistance, and unable to attend to own bodily needs without assistance.  -RB     Row Name 05/16/22 1033          Functional Assessment    Outcome Measure Options AM-PAC 6 Clicks Daily Activity (OT);Modified [place]  -RB           User Key  (r) = Recorded By, (t) = Taken By, (c) = Cosigned By    Initials Name Provider Type    Sobeida Faust OT Occupational Therapist                Occupational Therapy Education                 Title: PT OT SLP Therapies (Not Started)     Topic: Occupational Therapy (Not Started)     Point: ADL training (Not Started)     Description:   Instruct learner(s) on proper safety adaptation and remediation techniques during self care or transfers.   Instruct in proper use of assistive devices.              Learner Progress:  Not documented in this visit.          Point: Home exercise program (Not Started)     Description:   Instruct learner(s) on appropriate technique for monitoring, assisting and/or progressing therapeutic exercises/activities.              Learner Progress:  Not documented in this visit.          Point: Precautions (Not Started)     Description:   Instruct learner(s) on prescribed precautions during self-care and functional transfers.              Learner Progress:  Not documented in this visit.          Point: Body mechanics (Not Started)     Description:   Instruct learner(s) on proper positioning and spine alignment during self-care, functional mobility activities and/or exercises.              Learner Progress:  Not documented in this visit.                           OT Recommendation and Plan  Planned Therapy Interventions (OT): transfer/mobility retraining, strengthening exercise, ROM/therapeutic exercise, activity tolerance training, adaptive equipment training, BADL retraining, functional balance retraining, occupation/activity based interventions, patient/caregiver education/training, cognitive/visual perception retraining, neuromuscular control/coordination retraining  Therapy Frequency (OT): 5 times/wk  Plan of Care Review  Plan of Care Reviewed With: patient  Progress: no change  Outcome Evaluation: Pt admitted to [facility] 2/2 to a pinch in his back causing falls and weakness at home. Pt diagnosed with L4-5 vertebral bodies but not concerning for significant injury as well as ETOH withdrawal. Pmhx includes HTN and tobacco use. 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 He does not have family support and reports independence with all ADL's and mobility. Today, the pt is oriented x3 with generalized confusion present. Min A for bed mobility. CGA->Mod A for sitting balance at the EOB due to poor trunk control and weakness. Max A for LBD due to his impaired sitting balance and coordination. Ataxic movements in BUE's present. Max A x2 to stand with knee buckling present in both legs. He is very unsteady and unsafe to attempt further mobility/transfers/OOB ADL's at this time. SNF recommended pending progress.      Patient was not wearing a face mask during this therapy encounter. Therapist used appropriate personal protective equipment including mask and gloves.  Mask used was standard procedure mask. Appropriate PPE was worn during the entire therapy session. Hand hygiene was completed before and after therapy session. Patient is not in enhanced droplet precautions.     Time Calculation:    Time Calculation- OT     Row Name 05/16/22 1034             Time Calculation- OT    OT Start Time 0840  -RB      OT Stop Time 0909   -RB      OT Time Calculation (min) 29 min  -RB      Total Timed Code Minutes- OT 10 minute(s)  -RB      OT Received On 05/16/22  -RB      OT - Next Appointment 05/17/22  -RB      OT Goal Re-Cert Due Date 05/30/22  -RB              Timed Charges    65109 - OT Self Care/Mgmt Minutes 10  -RB              Untimed Charges    OT Eval/Re-eval Minutes 19  -RB              Total Minutes    Timed Charges Total Minutes 10  -RB      Untimed Charges Total Minutes 19  -RB       Total Minutes 29  -RB            User Key  (r) = Recorded By, (t) = Taken By, (c) = Cosigned By    Initials Name Provider Type    RB Sobeida Bustos OT Occupational Therapist              Therapy Charges for Today     Code Description Service Date Service Provider Modifiers Qty    [id number]  OT EVAL MOD COMPLEXITY 3 5/16/2022 Sobeida Bustos OT GO 1    [id number]  OT SELF CARE/MGMT/TRAIN EA 15 MIN 5/16/2022 Sobeida Bustos OT GO 1               Sobeida Bustos OT  5/16/2022

## 2022-05-16 NOTE — PLAN OF CARE
Problem: Pain Acute  Goal: Acceptable Pain Control and Functional Ability  Outcome: Ongoing, Not Progressing  Intervention: Prevent or Manage Pain  Recent Flowsheet Documentation  Taken 5/16/2022 1425 by Ruth Perez RN  Medication Review/Management: medications reviewed  Taken 5/16/2022 1333 by Ruth Perez RN  Medication Review/Management: medications reviewed  Taken 5/16/2022 1058 by Ruth Perez RN  Medication Review/Management: medications reviewed  Taken 5/16/2022 0846 by Ruth Perez RN  Sensory Stimulation Regulation:   care clustered   lighting decreased   television on  Sleep/Rest Enhancement:   awakenings minimized   natural light exposure provided   relaxation techniques promoted  Intervention: Develop Pain Management Plan  Recent Flowsheet Documentation  Taken 5/16/2022 1200 by Ruth Perez RN  Pain Management Interventions:   pillow support provided   relaxation techniques promoted   quiet environment facilitated  Taken 5/16/2022 1132 by Ruth Perez RN  Pain Management Interventions:   awakened for pain meds per patient request   see MAR  Taken 5/16/2022 0846 by Ruth Perez RN  Pain Management Interventions: see MAR  Intervention: Optimize Psychosocial Wellbeing  Recent Flowsheet Documentation  Taken 5/16/2022 0846 by Ruth Perez RN  Supportive Measures:   active listening utilized   relaxation techniques promoted   problem-solving facilitated     Problem: Adult Inpatient Plan of Care  Goal: Plan of Care Review  Outcome: Ongoing, Not Progressing  Flowsheets (Taken 5/16/2022 1457)  Progress: no change  Plan of Care Reviewed With: patient  Outcome Evaluation: Patient continues to have intractable back pain and pain that radiates to lle. He reports pain dispite falling asleep after he request the med. Presently he is sleeping, His blood pressure decreased to 102 systolically but his heart rate was 123. Dr. Lopez is aware. 1400 clonidine medication was held related to bp 102 systollicaly. Nursing  will continue to monitor CIWA and treat per MD orders.  Goal: Patient-Specific Goal (Individualized)  Outcome: Ongoing, Not Progressing  Goal: Absence of Hospital-Acquired Illness or Injury  Outcome: Ongoing, Not Progressing  Intervention: Identify and Manage Fall Risk  Recent Flowsheet Documentation  Taken 5/16/2022 1425 by Ruth Perez RN  Safety Promotion/Fall Prevention: activity supervised  Taken 5/16/2022 1333 by Ruth Perez RN  Safety Promotion/Fall Prevention:   activity supervised   fall prevention program maintained   clutter free environment maintained   lighting adjusted   room organization consistent   safety round/check completed   toileting scheduled  Taken 5/16/2022 1200 by Ruth Perez RN  Safety Promotion/Fall Prevention: activity supervised  Taken 5/16/2022 1132 by Ruth Perez RN  Safety Promotion/Fall Prevention: activity supervised  Taken 5/16/2022 1058 by Ruth Perez RN  Safety Promotion/Fall Prevention:   activity supervised   lighting adjusted   room organization consistent   safety round/check completed   toileting scheduled   fall prevention program maintained   clutter free environment maintained  Taken 5/16/2022 0846 by Ruth Perez RN  Safety Promotion/Fall Prevention:   activity supervised   clutter free environment maintained   fall prevention program maintained   lighting adjusted   mobility aid in reach   nonskid shoes/slippers when out of bed   room organization consistent   safety round/check completed   toileting scheduled  Intervention: Prevent Skin Injury  Recent Flowsheet Documentation  Taken 5/16/2022 1425 by Ruth Perez RN  Body Position: neutral body alignment  Taken 5/16/2022 1333 by Ruth Perez RN  Body Position: neutral body alignment  Skin Protection: incontinence pads utilized  Taken 5/16/2022 1200 by Ruth Perez RN  Body Position: neutral body alignment  Taken 5/16/2022 1132 by Ruth Perez RN  Body Position: sitting up in bed  Taken 5/16/2022 1058 by Chris  JULI Miranda  Body Position: neutral head position  Taken 5/16/2022 0846 by Ruth Perez RN  Body Position: dangle, side of bed  Skin Protection: incontinence pads utilized  Intervention: Prevent and Manage VTE (Venous Thromboembolism) Risk  Recent Flowsheet Documentation  Taken 5/16/2022 1425 by Ruth Perez RN  Activity Management: activity adjusted per tolerance  Taken 5/16/2022 1333 by Ruth Perez RN  Activity Management: activity adjusted per tolerance  Taken 5/16/2022 1058 by Ruth Perez RN  Activity Management: activity adjusted per tolerance  Taken 5/16/2022 0846 by Ruth Perez RN  Activity Management: activity adjusted per tolerance  VTE Prevention/Management: sequential compression devices off  Range of Motion: active ROM (range of motion) encouraged  Intervention: Prevent Infection  Recent Flowsheet Documentation  Taken 5/16/2022 1425 by Ruth Perez RN  Infection Prevention:   environmental surveillance performed   hand hygiene promoted   single patient room provided  Taken 5/16/2022 1333 by Ruth Perez RN  Infection Prevention:   environmental surveillance performed   hand hygiene promoted   single patient room provided  Taken 5/16/2022 1200 by Ruth Perez RN  Infection Prevention: hand hygiene promoted  Taken 5/16/2022 1132 by Ruth Perez RN  Infection Prevention: environmental surveillance performed  Taken 5/16/2022 1058 by Ruth Perez RN  Infection Prevention:   environmental surveillance performed   hand hygiene promoted   single patient room provided  Taken 5/16/2022 0846 by Ruth Perez RN  Infection Prevention:   environmental surveillance performed   hand hygiene promoted   single patient room provided  Goal: Optimal Comfort and Wellbeing  Outcome: Ongoing, Not Progressing  Intervention: Monitor Pain and Promote Comfort  Recent Flowsheet Documentation  Taken 5/16/2022 1200 by Ruth Perez RN  Pain Management Interventions:   pillow support provided   relaxation techniques promoted   quiet  environment facilitated  Taken 5/16/2022 1132 by Ruth Perez RN  Pain Management Interventions:   awakened for pain meds per patient request   see MAR  Taken 5/16/2022 0846 by Ruth Perez RN  Pain Management Interventions: see MAR  Intervention: Provide Person-Centered Care  Recent Flowsheet Documentation  Taken 5/16/2022 1333 by Ruth Perez RN  Trust Relationship/Rapport:   care explained   choices provided   emotional support provided   empathic listening provided   questions answered   questions encouraged   reassurance provided   thoughts/feelings acknowledged  Taken 5/16/2022 0846 by Ruth Perez RN  Trust Relationship/Rapport:   care explained   choices provided   emotional support provided   empathic listening provided   questions answered   questions encouraged   reassurance provided   thoughts/feelings acknowledged  Goal: Readiness for Transition of Care  Outcome: Ongoing, Not Progressing   Goal Outcome Evaluation:  Plan of Care Reviewed With: patient        Progress: no change  Outcome Evaluation: Patient continues to have intractable back pain and pain that radiates to lle. He reports pain dispite falling asleep after he request the med. Presently he is sleeping, His blood pressure decreased to 102 systolically but his heart rate was 123. Dr. Lopez is aware. 1400 clonidine medication was held related to bp 102 systollicaly. Nursing will continue to monitor CIWA and treat per MD orders.   Detail Level: Zone Detail Level: Detailed

## 2022-05-17 PROBLEM — R00.0 SINUS TACHYCARDIA: Status: ACTIVE | Noted: 2022-05-17

## 2022-05-17 PROBLEM — R03.0 ELEVATED BP WITHOUT DIAGNOSIS OF HYPERTENSION: Status: ACTIVE | Noted: 2022-05-17

## 2022-05-17 LAB
ANION GAP SERPL CALCULATED.3IONS-SCNC: 10.1 MMOL/L (ref 5–15)
BUN SERPL-MCNC: 10 MG/DL (ref 6–20)
BUN/CREAT SERPL: 16.4 (ref 7–25)
CALCIUM SPEC-SCNC: 9.6 MG/DL (ref 8.6–10.5)
CHLORIDE SERPL-SCNC: 99 MMOL/L (ref 98–107)
CO2 SERPL-SCNC: 23.9 MMOL/L (ref 22–29)
CREAT SERPL-MCNC: 0.61 MG/DL (ref 0.76–1.27)
DEPRECATED RDW RBC AUTO: 46 FL (ref 37–54)
EGFRCR SERPLBLD CKD-EPI 2021: 119.2 ML/MIN/1.73
ERYTHROCYTE [DISTWIDTH] IN BLOOD BY AUTOMATED COUNT: 13.1 % (ref 12.3–15.4)
GLUCOSE BLDC GLUCOMTR-MCNC: 96 MG/DL (ref 70–130)
GLUCOSE SERPL-MCNC: 94 MG/DL (ref 65–99)
HCT VFR BLD AUTO: 39.6 % (ref 37.5–51)
HGB BLD-MCNC: 13.7 G/DL (ref 13–17.7)
MAGNESIUM SERPL-MCNC: 2 MG/DL (ref 1.6–2.6)
MCH RBC QN AUTO: 33.2 PG (ref 26.6–33)
MCHC RBC AUTO-ENTMCNC: 34.6 G/DL (ref 31.5–35.7)
MCV RBC AUTO: 95.9 FL (ref 79–97)
PHOSPHATE SERPL-MCNC: 4 MG/DL (ref 2.5–4.5)
PLATELET # BLD AUTO: 287 10*3/MM3 (ref 140–450)
PMV BLD AUTO: 10.3 FL (ref 6–12)
POTASSIUM SERPL-SCNC: 4.3 MMOL/L (ref 3.5–5.2)
QT INTERVAL: 325 MS
RBC # BLD AUTO: 4.13 10*6/MM3 (ref 4.14–5.8)
SODIUM SERPL-SCNC: 133 MMOL/L (ref 136–145)
WBC NRBC COR # BLD: 6.37 10*3/MM3 (ref 3.4–10.8)

## 2022-05-17 PROCEDURE — 25010000002 HYDROMORPHONE PER 4 MG: Performed by: INTERNAL MEDICINE

## 2022-05-17 PROCEDURE — 25010000002 LORAZEPAM PER 2 MG: Performed by: INTERNAL MEDICINE

## 2022-05-17 PROCEDURE — 83735 ASSAY OF MAGNESIUM: CPT | Performed by: INTERNAL MEDICINE

## 2022-05-17 PROCEDURE — 85027 COMPLETE CBC AUTOMATED: CPT | Performed by: INTERNAL MEDICINE

## 2022-05-17 PROCEDURE — 25010000002 ENOXAPARIN PER 10 MG: Performed by: INTERNAL MEDICINE

## 2022-05-17 PROCEDURE — 63710000001 METHYLPREDNISOLONE 4 MG TABLET THERAPY PACK 21 EACH DISP PACK: Performed by: INTERNAL MEDICINE

## 2022-05-17 PROCEDURE — 84100 ASSAY OF PHOSPHORUS: CPT | Performed by: INTERNAL MEDICINE

## 2022-05-17 PROCEDURE — 80048 BASIC METABOLIC PNL TOTAL CA: CPT | Performed by: INTERNAL MEDICINE

## 2022-05-17 RX ORDER — CLONIDINE HYDROCHLORIDE 0.1 MG/1
0.1 TABLET ORAL EVERY 12 HOURS SCHEDULED
Status: DISCONTINUED | OUTPATIENT
Start: 2022-05-18 | End: 2022-05-19

## 2022-05-17 RX ADMIN — FOLIC ACID 1 MG: 1 TABLET ORAL at 08:55

## 2022-05-17 RX ADMIN — HYDROMORPHONE HYDROCHLORIDE 0.5 MG: 1 INJECTION, SOLUTION INTRAMUSCULAR; INTRAVENOUS; SUBCUTANEOUS at 16:31

## 2022-05-17 RX ADMIN — HYDROMORPHONE HYDROCHLORIDE 0.5 MG: 1 INJECTION, SOLUTION INTRAMUSCULAR; INTRAVENOUS; SUBCUTANEOUS at 20:30

## 2022-05-17 RX ADMIN — CLONIDINE HYDROCHLORIDE 0.1 MG: 0.1 TABLET ORAL at 06:10

## 2022-05-17 RX ADMIN — HYDROCODONE BITARTRATE AND ACETAMINOPHEN 1 TABLET: 7.5; 325 TABLET ORAL at 18:16

## 2022-05-17 RX ADMIN — Medication 100 MG: at 08:55

## 2022-05-17 RX ADMIN — METHYLPREDNISOLONE 4 MG: 4 TABLET ORAL at 18:16

## 2022-05-17 RX ADMIN — LORAZEPAM 1 MG: 2 INJECTION INTRAMUSCULAR; INTRAVENOUS at 08:55

## 2022-05-17 RX ADMIN — ENOXAPARIN SODIUM 40 MG: 100 INJECTION SUBCUTANEOUS at 20:31

## 2022-05-17 RX ADMIN — Medication 10 ML: at 08:58

## 2022-05-17 RX ADMIN — METHYLPREDNISOLONE 4 MG: 4 TABLET ORAL at 06:10

## 2022-05-17 RX ADMIN — Medication 10 ML: at 21:40

## 2022-05-17 RX ADMIN — HYDROCODONE BITARTRATE AND ACETAMINOPHEN 1 TABLET: 7.5; 325 TABLET ORAL at 01:22

## 2022-05-17 RX ADMIN — GABAPENTIN 100 MG: 100 CAPSULE ORAL at 08:55

## 2022-05-17 RX ADMIN — CLONIDINE HYDROCHLORIDE 0.1 MG: 0.1 TABLET ORAL at 16:38

## 2022-05-17 RX ADMIN — GABAPENTIN 100 MG: 100 CAPSULE ORAL at 20:30

## 2022-05-17 RX ADMIN — HYDROCODONE BITARTRATE AND ACETAMINOPHEN 1 TABLET: 7.5; 325 TABLET ORAL at 13:34

## 2022-05-17 RX ADMIN — HYDROMORPHONE HYDROCHLORIDE 0.5 MG: 1 INJECTION, SOLUTION INTRAMUSCULAR; INTRAVENOUS; SUBCUTANEOUS at 05:32

## 2022-05-17 RX ADMIN — HYDROMORPHONE HYDROCHLORIDE 0.5 MG: 1 INJECTION, SOLUTION INTRAMUSCULAR; INTRAVENOUS; SUBCUTANEOUS at 12:40

## 2022-05-17 RX ADMIN — METHYLPREDNISOLONE 4 MG: 4 TABLET ORAL at 12:41

## 2022-05-17 RX ADMIN — NICOTINE 1 PATCH: 21 PATCH, EXTENDED RELEASE TRANSDERMAL at 08:58

## 2022-05-17 RX ADMIN — HYDROCODONE BITARTRATE AND ACETAMINOPHEN 1 TABLET: 7.5; 325 TABLET ORAL at 08:55

## 2022-05-17 RX ADMIN — LIDOCAINE 2 PATCH: 50 PATCH TOPICAL at 08:55

## 2022-05-17 RX ADMIN — LORAZEPAM 2 MG: 2 INJECTION INTRAMUSCULAR; INTRAVENOUS at 20:30

## 2022-05-17 RX ADMIN — LORAZEPAM 1 MG: 2 INJECTION INTRAMUSCULAR; INTRAVENOUS at 01:15

## 2022-05-17 NOTE — SIGNIFICANT NOTE
05/17/22 1356   OTHER   Discipline occupational therapist   Rehab Time/Intention   Session Not Performed other (see comments)  (Pt's resting HR this afternoon is in the 130's while in supine. Will f/u tomorrow.)   Recommendation   OT - Next Appointment 05/18/22

## 2022-05-17 NOTE — PROGRESS NOTES
Name: Bogdan Jeter ADMIT: 2022   : 1975  PCP: Provider, No Known    MRN: [id number] LOS: 8 days   AGE/SEX: 46 y.o. male  ROOM: [facility]     Subjective   Subjective   Feeling better each day. No N/V/D/abd pain. No F/C/NS. No HA or vis changes. Tolerating diet. Voiding well. Still having occasional VH (mouse crawling along baseboards). No tremor. No SOA or CP or palp. Back pain much improved.    Review of Systems   as above    Objective   Objective   Vital Signs  Temp:  [97.7 °F (36.5 °C)-98.8 °F (37.1 °C)] 97.7 °F (36.5 °C)  Heart Rate:  [101-140] 114  Resp:  [18] 18  BP: (101-148)/(73-98) 148/97  SpO2:  [94 %-100 %] 94 %  on   ;   Device (Oxygen Therapy): room air  Body mass index is 24.1 kg/m².  Physical Exam  Vitals and nursing note reviewed.   Constitutional:       General: He is not in acute distress.     Appearance: He is ill-appearing (chronically). He is not toxic-appearing or diaphoretic.   HENT:      Head: Normocephalic and atraumatic.      Comments: Multiple subQ nodules on face, chronic     Right Ear: External ear normal.      Left Ear: External ear normal.      Mouth/Throat:      Mouth: Mucous membranes are moist.      Pharynx: Oropharynx is clear.   Eyes:      General: No scleral icterus.        Right eye: No discharge.         Left eye: No discharge.      Extraocular Movements: Extraocular movements intact.      Conjunctiva/sclera: Conjunctivae normal.   Cardiovascular:      Rate and Rhythm: Regular rhythm. Tachycardia present.      Pulses: Normal pulses.      Heart sounds: Normal heart sounds.   Pulmonary:      Effort: Pulmonary effort is normal. No respiratory distress.      Breath sounds: Normal breath sounds. No wheezing or rales.   Abdominal:      General: Bowel sounds are normal. There is no distension.      Palpations: Abdomen is soft.      Tenderness: There is no abdominal tenderness.   Musculoskeletal:         General: No swelling or deformity. Normal range of motion.       Cervical back: Neck supple. No rigidity.   Lymphadenopathy:      Cervical: No cervical adenopathy.   Skin:     General: Skin is warm and dry.      Capillary Refill: Capillary refill takes less than 2 seconds.      Coloration: Skin is not jaundiced.      Findings: No rash.      Comments: Tattoos   Neurological:      General: No focal deficit present.      Mental Status: He is alert and oriented to person, place, and time. Mental status is at baseline.      Cranial Nerves: No cranial nerve deficit.      Coordination: Coordination normal.      Comments:  No tremor   Psychiatric:         Mood and Affect: Mood normal.         Behavior: Behavior normal.         Thought Content: Thought content normal.      Comments: Affect flat         Results Review     I reviewed the patient's new clinical results.  Results from last 7 days   Lab Units 05/17/22 0621 05/14/22 0343 05/13/22 0414 05/12/22  0322   WBC 10*3/mm3 6.37 8.54 7.18 9.17   HEMOGLOBIN g/dL 13.7 14.5 13.5 14.0   PLATELETS 10*3/mm3 287 205 199 203     Results from last 7 days   Lab Units 05/17/22  0621 05/15/22  0430 05/14/22  0343 05/13/22  0519   SODIUM mmol/L 133* 135* 135* 135*   POTASSIUM mmol/L 4.3 4.5 4.1 4.7   CHLORIDE mmol/L 99 100 101 104   CO2 mmol/L 23.9 22.0 21.0* 22.0   BUN mg/dL 10 8 5* 4*   CREATININE mg/dL 0.61* 0.59* 0.74* 0.55*   GLUCOSE mg/dL 94 91 117* 174*   EGFR mL/min/1.73 119.2 120.4 112.5 123.0     Results from last 7 days   Lab Units 05/13/22 0414 05/12/22  0322 05/11/22  0559   ALBUMIN g/dL 3.00* 3.40* 3.30*   BILIRUBIN mg/dL 1.0 0.9 0.9   ALK PHOS U/L 103 122* 119*   AST (SGOT) U/L 28 42* 50*   ALT (SGPT) U/L 23 29 33     Results from last 7 days   Lab Units 05/17/22  0621 05/15/22  0430 05/14/22 0343 05/13/22  0519 05/13/22  0414 05/12/22  0322 05/11/22  0559 05/11/22  0559   CALCIUM mg/dL 9.6 9.5 9.4 9.0 8.3* 8.9  --  8.6   ALBUMIN g/dL  --   --   --   --  3.00* 3.40*  --  3.30*   MAGNESIUM mg/dL 2.0 1.7  --   --   --  1.8  --   --     PHOSPHORUS mg/dL 4.0 5.0* 3.7  --  3.7 3.6   < >  --     < > = values in this interval not displayed.     Results from last 7 days   Lab Units 05/14/22  0343   PROCALCITONIN ng/mL 0.16     Glucose   Date/Time Value Ref Range Status   05/16/2022 2355 96 70 - 130 mg/dL Final     Comment:     Meter: [id number] : [id number] Vahid Castellanos VIRGILIO       No radiology results for the last day  Scheduled Medications  [START ON 5/18/2022] cloNIDine, 0.1 mg, Oral, Q12H  enoxaparin, 40 mg, Subcutaneous, Nightly  folic acid, 1 mg, Oral, Daily  gabapentin, 100 mg, Oral, Q12H  lidocaine, 2 patch, Transdermal, Q24H  methylPREDNISolone, 4 mg, Oral, TID Around Food  [START ON 5/18/2022] methylPREDNISolone, 4 mg, Oral, Before Breakfast  [START ON 5/18/2022] methylPREDNISolone, 4 mg, Oral, Tonight  [START ON 5/19/2022] methylPREDNISolone, 4 mg, Oral, Before Breakfast  nicotine, 1 patch, Transdermal, Q24H  sodium chloride, 10 mL, Intravenous, Q12H  thiamine, 100 mg, Oral, Daily    Infusions   Diet  Diet Regular       Assessment/Plan     Active Hospital Problems    Diagnosis  POA   • **Intractable back pain [M54.9]  Yes   • Elevated BP without diagnosis of hypertension [R03.0]  No   • Sinus tachycardia [R00.0]  No   • Hypokalemia [E87.6]  Yes   • Tobacco abuse [Z72.0]  Yes   • Alcohol withdrawal (HCC) [F10.239]  Yes   • Heart burn [R12]  Yes      Resolved Hospital Problems   No resolved problems to display.       Mr. Jeter is a 46 year old male who presented to the hospital with complaints of severe back pain. Hospital course complicated by severe alcohol withdrawal requiring transfer to ICU.      · Severe alcohol withdrawal requiring high doses of Ativan as well as transfer to the ICU and Precedex. Off Precedex and transferred out of ICU. CIWA scores zero but still on scheduled Ativan every 8h. Have stopped scheduled Ativan this AM. Continue PRN Ativan per CIWA protocol. Continue vitamin replacement. Access following.    · Sinus  Tachycardia: suspect due to withdrawal. Asymptomatic. Continue to monitor on telemetry.    · Elevated BP w/o dx of HTN: supsect due to withdrawal, BPs looking better on scheduled Clonidine, will start to wean     · Intractable back pain: MRI now returned with some DDD but no acute fracture/compression/disc herniation. L4-5 with shortened pedicles and mild central canal stenosis. Nothing surgical it seems. Orthopedic surgery was consulted--they agree with supportive care with pain meds, etc. PT evaluation noted. Very weak. SNF recommended at PR. Pain currently improved on Medrol dose pack as well as gabapentin.    · Nicotine patch    · Monitoring electrolytes    · Monitoring BG      · Lovenox for DVT prophylaxis.  · Full code.  · Discussed with patient, nursing staff, CCP and care team on multidisciplinary rounds.  · Anticipate discharge to SNU facility, timing yet to be determined.      Kehinde Hi MD  [facility]  05/17/22  16:41 EDT

## 2022-05-17 NOTE — CASE MANAGEMENT/SOCIAL WORK
Continued Stay Note  [facility]     Patient Name: Bogdan Jeter  MRN: [id number]  Today's Date: 5/17/2022    Admit Date: 5/9/2022     Discharge Plan     Row Name 05/17/22 1051       Plan    Plan Area SNF referrals pending    Patient/Family in Agreement with Plan yes    Plan Comments Reviewed clinicals, pt continues with weakness and PT recommends SNF prior to dc home. CCP spoke with pt at bedside, introduced self and explained CCP role. Discussed dc planning and PT recommendations. CCP offered to make area referrals for SNF to determine insurance and bed availablity. Pt agreeable. CCP made area SNF referrals in [facility]. Await referral outcomes. Tiara BUSTOS/CCP               Discharge Codes    No documentation.               Expected Discharge Date and Time     Expected Discharge Date Expected Discharge Time    May 18, 2022             Dian Rodriguez, RN

## 2022-05-17 NOTE — PLAN OF CARE
Goal Outcome Evaluation:  Plan of Care Reviewed With: patient    IRAM is currently a 1. Pt does exhibit some mild anxiety but withdrawals appear to be improving. RA. HR continues to be elevated. He does not c/o dizziness or headache. No CP reported. Receives scheduled and prn Ativan. Able to make needs known. Complains of body aches. PRN medication given.

## 2022-05-17 NOTE — PLAN OF CARE
Problem: Pain Acute  Goal: Acceptable Pain Control and Functional Ability  Outcome: Ongoing, Not Progressing  Intervention: Prevent or Manage Pain  Recent Flowsheet Documentation  Taken 5/17/2022 1631 by Ruth Perez RN  Medication Review/Management: medications reviewed  Taken 5/17/2022 1444 by Ruth Perez RN  Medication Review/Management: medications reviewed  Taken 5/17/2022 1334 by Ruth Perez RN  Medication Review/Management: medications reviewed  Intervention: Develop Pain Management Plan  Recent Flowsheet Documentation  Taken 5/17/2022 1334 by Ruth Perez RN  Pain Management Interventions: see MAR  Taken 5/17/2022 1240 by Ruth Perez RN  Pain Management Interventions: see MAR  Taken 5/17/2022 0855 by Ruth Perez RN  Pain Management Interventions: see MAR  Intervention: Optimize Psychosocial Wellbeing  Recent Flowsheet Documentation  Taken 5/17/2022 1334 by Ruth Perez RN  Supportive Measures: active listening utilized  Taken 5/17/2022 0855 by Ruth Perez RN  Supportive Measures: active listening utilized   Goal Outcome Evaluation:  Plan of Care Reviewed With: patient        Progress: no change  Outcome Evaluation: Patient ciwa score remains zero at this time. His pain has decreased with consistent use of pain medications altering between oral and iv medications. His ability to feed self, use his urinal, and make calls using his upper body is more coordinated and he can do this by himself rather than having assistance like yesterday. However, he continues to have issues coordinating some of own care such as the use of his urinal.

## 2022-05-17 NOTE — PROGRESS NOTES
"Adult Nutrition  Assessment/PES    Patient Name:  Bogdan Jeter  YOB: 1975  MRN: [id number]  Admit Date:  5/9/2022    Assessment Date:  5/17/2022    Comments:   Nutrition screen completed for Length of Stay. Pt on Regular diet with good po intake %. Pt receiving folic acid and thiamine. Visited pt and reviewed food pref's. Will continue to monitor and remain available as needed.       Reason for Assessment     Row Name 05/17/22 0948          Reason for Assessment    Reason For Assessment other (see comments)  LOS     Diagnosis substance use/abuse;other (see comments)  back pain, ETOH abuse                Nutrition/Diet History     Row Name 05/17/22 0948          Nutrition/Diet History    Typical Intake (Food/Fluid/EN/PN) po good %     Food Preferences french toast, mahajan                Anthropometrics     Row Name 05/17/22 0950          Anthropometrics    Height 180.3 cm (70.98\")     Weight 78.4 kg (172 lb 12 oz)  not weighed by RD     Additional Documentation Ideal Body Weight (IBW) (Group)  BMI 24.09            Ideal Body Weight (IBW)    Ideal Body Weight 171lb                Labs/Tests/Procedures/Meds     Row Name 05/17/22 0948          Labs/Procedures/Meds    Lab Results Reviewed reviewed     Lab Results Comments na, alb            Diagnostic Tests/Procedures    Diagnostic Test/Procedure Reviewed reviewed            Medications    Pertinent Medications Reviewed reviewed     Pertinent Medications Comments folic acid and thiamine                Physical Findings     Row Name 05/17/22 0949          Physical Findings    Overall Physical Appearance missing teeth                Estimated/Assessed Needs - Anthropometrics     Row Name 05/17/22 0950          Anthropometrics    Height 180.3 cm (70.98\")     Weight 78.4 kg (172 lb 12 oz)  not weighed by RD     Additional Documentation Ideal Body Weight (IBW) (Group)  BMI 24.09            Ideal Body Weight (IBW)    Ideal Body Weight 171lb        "         Nutrition Prescription Ordered     Row Name 05/17/22 0951          Nutrition Prescription PO    Current PO Diet Regular                       Problem/Interventions:   Problem 1     Row Name 05/17/22 0951          Nutrition Diagnoses Problem 1    Problem 1 Nutrition Appropriate for Condition at this Time     Etiology (related to) Medical Diagnosis                      Intervention Goal     Row Name 05/17/22 0952          Intervention Goal    General Maintain nutrition     PO Maintain intake     Weight Maintain weight                Nutrition Intervention     Row Name 05/17/22 0953          Nutrition Intervention    RD/Tech Action Follow Tx progress;Care plan reviewd;Interview for preference                  Education/Evaluation     Row Name 05/17/22 0953          Education    Education Will Instruct as appropriate            Monitor/Evaluation    Monitor Per protocol                 Electronically signed by:  Carolyn Wiley RD  05/17/22 09:57 EDT

## 2022-05-17 NOTE — DISCHARGE PLACEMENT REQUEST
"Bogdan Pearson (46 y.o. Male)             Date of Birth   1975    Social Security Number       Address   [address] [place], [place] [address]    Home Phone   [phone]    MRN   [id number]       Hoahaoism   None    Marital Status   Single                            Admission Date   5/9/22    Admission Type   Emergency    Admitting Provider   Cony Damian MD    Attending Provider   Kehinde Hi MD    Department, Room/Bed   [address], [address]       Discharge Date       Discharge Disposition       Discharge Destination                               Attending Provider: Kehinde Hi MD    Allergies: No Known Allergies    Isolation: None   Infection: None   Code Status: CPR   Advance Care Planning Activity    Ht: 180.3 cm (70.98\")   Wt: 78.4 kg (172 lb 12 oz)    Admission Cmt: None   Principal Problem: Intractable back pain [M54.9]                 Active Insurance as of 5/9/2022     Primary Coverage     Payor Plan Insurance Group Employer/Plan Group    [facility] VALERI [facility] BY VALERI [id number]     Payor Plan Address Payor Plan Phone Number Payor Plan Fax Number Effective Dates    [address]   1/1/2021 - None Entered    [facility] [id number]       Subscriber Name Subscriber Birth Date Member ID       BOGDAN PEARSON 1975 [id number]                 Emergency Contacts      (Rel.) Home Phone Work Phone Mobile Phone    WEBSTERKRYSTAL (Father) [phone] -- [phone]          "

## 2022-05-17 NOTE — PROGRESS NOTES
[facility] did follow-up on patient.  Patient remains unsure about which skilled nursing facility he will be going to for nursing rehab.  Patient remembers Mylene coming to see him and giving him information but he had no questions about it at this time.  Access will continue to follow up to see if he has any questions about the [facility] resources.

## 2022-05-17 NOTE — NURSING NOTE
Patient refused turns today. He reported that he could not place pillows under his back or buttocks as this would hurt his spine. So nurse added ADD pump to bed.

## 2022-05-18 LAB
ALBUMIN SERPL-MCNC: 3.7 G/DL (ref 3.5–5.2)
ALBUMIN/GLOB SERPL: 1.2 G/DL
ALP SERPL-CCNC: 101 U/L (ref 39–117)
ALT SERPL W P-5'-P-CCNC: 30 U/L (ref 1–41)
ANION GAP SERPL CALCULATED.3IONS-SCNC: 12.2 MMOL/L (ref 5–15)
AST SERPL-CCNC: 35 U/L (ref 1–40)
BASOPHILS # BLD AUTO: 0.11 10*3/MM3 (ref 0–0.2)
BASOPHILS NFR BLD AUTO: 1.6 % (ref 0–1.5)
BILIRUB SERPL-MCNC: 0.3 MG/DL (ref 0–1.2)
BUN SERPL-MCNC: 9 MG/DL (ref 6–20)
BUN/CREAT SERPL: 15.3 (ref 7–25)
CALCIUM SPEC-SCNC: 10 MG/DL (ref 8.6–10.5)
CHLORIDE SERPL-SCNC: 98 MMOL/L (ref 98–107)
CO2 SERPL-SCNC: 22.8 MMOL/L (ref 22–29)
CREAT SERPL-MCNC: 0.59 MG/DL (ref 0.76–1.27)
DEPRECATED RDW RBC AUTO: 48.3 FL (ref 37–54)
EGFRCR SERPLBLD CKD-EPI 2021: 120.4 ML/MIN/1.73
EOSINOPHIL # BLD AUTO: 0.08 10*3/MM3 (ref 0–0.4)
EOSINOPHIL NFR BLD AUTO: 1.2 % (ref 0.3–6.2)
ERYTHROCYTE [DISTWIDTH] IN BLOOD BY AUTOMATED COUNT: 13.1 % (ref 12.3–15.4)
GLOBULIN UR ELPH-MCNC: 3.2 GM/DL
GLUCOSE SERPL-MCNC: 125 MG/DL (ref 65–99)
HCT VFR BLD AUTO: 44.4 % (ref 37.5–51)
HGB BLD-MCNC: 14.7 G/DL (ref 13–17.7)
IMM GRANULOCYTES # BLD AUTO: 0.07 10*3/MM3 (ref 0–0.05)
IMM GRANULOCYTES NFR BLD AUTO: 1 % (ref 0–0.5)
LYMPHOCYTES # BLD AUTO: 1.69 10*3/MM3 (ref 0.7–3.1)
LYMPHOCYTES NFR BLD AUTO: 24.9 % (ref 19.6–45.3)
MAGNESIUM SERPL-MCNC: 2.5 MG/DL (ref 1.6–2.6)
MCH RBC QN AUTO: 33.6 PG (ref 26.6–33)
MCHC RBC AUTO-ENTMCNC: 33.1 G/DL (ref 31.5–35.7)
MCV RBC AUTO: 101.6 FL (ref 79–97)
MONOCYTES # BLD AUTO: 1.17 10*3/MM3 (ref 0.1–0.9)
MONOCYTES NFR BLD AUTO: 17.2 % (ref 5–12)
NEUTROPHILS NFR BLD AUTO: 3.68 10*3/MM3 (ref 1.7–7)
NEUTROPHILS NFR BLD AUTO: 54.1 % (ref 42.7–76)
NRBC BLD AUTO-RTO: 0 /100 WBC (ref 0–0.2)
PLATELET # BLD AUTO: 303 10*3/MM3 (ref 140–450)
PMV BLD AUTO: 10.4 FL (ref 6–12)
POTASSIUM SERPL-SCNC: 4.4 MMOL/L (ref 3.5–5.2)
PROT SERPL-MCNC: 6.9 G/DL (ref 6–8.5)
RBC # BLD AUTO: 4.37 10*6/MM3 (ref 4.14–5.8)
SODIUM SERPL-SCNC: 133 MMOL/L (ref 136–145)
WBC NRBC COR # BLD: 6.8 10*3/MM3 (ref 3.4–10.8)

## 2022-05-18 PROCEDURE — 80053 COMPREHEN METABOLIC PANEL: CPT | Performed by: HOSPITALIST

## 2022-05-18 PROCEDURE — 83735 ASSAY OF MAGNESIUM: CPT | Performed by: HOSPITALIST

## 2022-05-18 PROCEDURE — 85025 COMPLETE CBC W/AUTO DIFF WBC: CPT | Performed by: HOSPITALIST

## 2022-05-18 PROCEDURE — 25010000002 HYDROMORPHONE PER 4 MG: Performed by: INTERNAL MEDICINE

## 2022-05-18 PROCEDURE — 97530 THERAPEUTIC ACTIVITIES: CPT

## 2022-05-18 PROCEDURE — 25010000002 ENOXAPARIN PER 10 MG: Performed by: INTERNAL MEDICINE

## 2022-05-18 PROCEDURE — 63710000001 METHYLPREDNISOLONE 4 MG TABLET THERAPY PACK 21 EACH DISP PACK: Performed by: INTERNAL MEDICINE

## 2022-05-18 RX ORDER — HYDROCODONE BITARTRATE AND ACETAMINOPHEN 10; 325 MG/1; MG/1
1 TABLET ORAL EVERY 4 HOURS PRN
Status: DISCONTINUED | OUTPATIENT
Start: 2022-05-18 | End: 2022-05-21 | Stop reason: HOSPADM

## 2022-05-18 RX ADMIN — Medication 10 ML: at 09:14

## 2022-05-18 RX ADMIN — HYDROMORPHONE HYDROCHLORIDE 0.5 MG: 1 INJECTION, SOLUTION INTRAMUSCULAR; INTRAVENOUS; SUBCUTANEOUS at 07:05

## 2022-05-18 RX ADMIN — METHYLPREDNISOLONE 4 MG: 4 TABLET ORAL at 07:36

## 2022-05-18 RX ADMIN — HYDROCODONE BITARTRATE AND ACETAMINOPHEN 1 TABLET: 10; 325 TABLET ORAL at 17:48

## 2022-05-18 RX ADMIN — METHYLPREDNISOLONE 4 MG: 4 TABLET ORAL at 21:31

## 2022-05-18 RX ADMIN — ENOXAPARIN SODIUM 40 MG: 100 INJECTION SUBCUTANEOUS at 21:31

## 2022-05-18 RX ADMIN — HYDROCODONE BITARTRATE AND ACETAMINOPHEN 1 TABLET: 10; 325 TABLET ORAL at 12:11

## 2022-05-18 RX ADMIN — HYDROCODONE BITARTRATE AND ACETAMINOPHEN 1 TABLET: 10; 325 TABLET ORAL at 21:31

## 2022-05-18 RX ADMIN — Medication 10 ML: at 21:33

## 2022-05-18 RX ADMIN — CLONIDINE HYDROCHLORIDE 0.1 MG: 0.1 TABLET ORAL at 21:31

## 2022-05-18 RX ADMIN — GABAPENTIN 100 MG: 100 CAPSULE ORAL at 09:14

## 2022-05-18 RX ADMIN — FOLIC ACID 1 MG: 1 TABLET ORAL at 09:13

## 2022-05-18 RX ADMIN — Medication 100 MG: at 09:13

## 2022-05-18 RX ADMIN — NICOTINE 1 PATCH: 21 PATCH, EXTENDED RELEASE TRANSDERMAL at 09:14

## 2022-05-18 RX ADMIN — CLONIDINE HYDROCHLORIDE 0.1 MG: 0.1 TABLET ORAL at 09:13

## 2022-05-18 RX ADMIN — GABAPENTIN 100 MG: 100 CAPSULE ORAL at 21:31

## 2022-05-18 RX ADMIN — LIDOCAINE 2 PATCH: 50 PATCH TOPICAL at 09:13

## 2022-05-18 NOTE — PROGRESS NOTES
Name: Bogdan Jeter ADMIT: 2022   : 1975  PCP: Provider, No Known    MRN: [id number] LOS: 9 days   AGE/SEX: 46 y.o. male  ROOM: [facility]     Subjective   Subjective   Feeling okay today. No N/V/D/abd pain. No F/C/NS. No HA or vis changes. Tolerating diet. Voiding well. Still having occasional VH (saw his cat). No tremor. No SOA or CP or palp. Back pain persists.    Review of Systems     as above    Objective   Objective   Vital Signs  Temp:  [97.7 °F (36.5 °C)-98.4 °F (36.9 °C)] 98.4 °F (36.9 °C)  Heart Rate:  [105-134] 109  Resp:  [16-18] 18  BP: (101-148)/() 128/96  SpO2:  [94 %-100 %] 97 %  on   ;   Device (Oxygen Therapy): room air  Body mass index is 24.1 kg/m².  Physical Exam  Vitals and nursing note reviewed.   Constitutional:       General: He is not in acute distress.     Appearance: He is ill-appearing (chronically). He is not toxic-appearing or diaphoretic.   HENT:      Head: Normocephalic and atraumatic.      Comments: Multiple subQ nodules on face, chronic     Right Ear: External ear normal.      Left Ear: External ear normal.      Mouth/Throat:      Mouth: Mucous membranes are moist.      Pharynx: Oropharynx is clear.   Eyes:      General: No scleral icterus.        Right eye: No discharge.         Left eye: No discharge.      Extraocular Movements: Extraocular movements intact.      Conjunctiva/sclera: Conjunctivae normal.   Cardiovascular:      Rate and Rhythm: Regular rhythm. Tachycardia present.      Pulses: Normal pulses.      Heart sounds: Normal heart sounds.   Pulmonary:      Effort: Pulmonary effort is normal. No respiratory distress.      Breath sounds: Normal breath sounds. No wheezing or rales.   Abdominal:      General: Bowel sounds are normal. There is no distension.      Palpations: Abdomen is soft.      Tenderness: There is no abdominal tenderness.   Musculoskeletal:         General: No swelling or deformity. Normal range of motion.      Cervical back: Neck supple.  No rigidity.   Lymphadenopathy:      Cervical: No cervical adenopathy.   Skin:     General: Skin is warm and dry.      Capillary Refill: Capillary refill takes less than 2 seconds.      Coloration: Skin is not jaundiced.      Findings: No rash.      Comments: Tattoos   Neurological:      General: No focal deficit present.      Mental Status: He is alert and oriented to person, place, and time. Mental status is at baseline.      Cranial Nerves: No cranial nerve deficit.      Coordination: Coordination normal.      Comments:  No tremor   Psychiatric:         Mood and Affect: Mood normal.         Behavior: Behavior normal.         Thought Content: Thought content normal.      Comments: Affect flat         Results Review     I reviewed the patient's new clinical results.  Results from last 7 days   Lab Units 05/18/22  0544 05/17/22  0621 05/14/22 0343 05/13/22 0414   WBC 10*3/mm3 6.80 6.37 8.54 7.18   HEMOGLOBIN g/dL 14.7 13.7 14.5 13.5   PLATELETS 10*3/mm3 303 287 205 199     Results from last 7 days   Lab Units 05/17/22  0621 05/15/22  0430 05/14/22 0343 05/13/22  0519   SODIUM mmol/L 133* 135* 135* 135*   POTASSIUM mmol/L 4.3 4.5 4.1 4.7   CHLORIDE mmol/L 99 100 101 104   CO2 mmol/L 23.9 22.0 21.0* 22.0   BUN mg/dL 10 8 5* 4*   CREATININE mg/dL 0.61* 0.59* 0.74* 0.55*   GLUCOSE mg/dL 94 91 117* 174*   EGFR mL/min/1.73 119.2 120.4 112.5 123.0     Results from last 7 days   Lab Units 05/13/22  0414 05/12/22  0322   ALBUMIN g/dL 3.00* 3.40*   BILIRUBIN mg/dL 1.0 0.9   ALK PHOS U/L 103 122*   AST (SGOT) U/L 28 42*   ALT (SGPT) U/L 23 29     Results from last 7 days   Lab Units 05/18/22  0544 05/17/22  0621 05/15/22  0430 05/14/22  0343 05/13/22  0519 05/13/22  0414 05/12/22  0322   CALCIUM mg/dL  --  9.6 9.5 9.4 9.0 8.3* 8.9   ALBUMIN g/dL  --   --   --   --   --  3.00* 3.40*   MAGNESIUM mg/dL 2.5 2.0 1.7  --   --   --  1.8   PHOSPHORUS mg/dL  --  4.0 5.0* 3.7  --  3.7 3.6     Results from last 7 days   Lab Units  05/14/22  0343   PROCALCITONIN ng/mL 0.16     Glucose   Date/Time Value Ref Range Status   05/16/2022 2355 96 70 - 130 mg/dL Final     Comment:     Meter: [id number] : [id number] Vahid POOLE       No radiology results for the last day  Scheduled Medications  cloNIDine, 0.1 mg, Oral, Q12H  enoxaparin, 40 mg, Subcutaneous, Nightly  folic acid, 1 mg, Oral, Daily  gabapentin, 100 mg, Oral, Q12H  lidocaine, 2 patch, Transdermal, Q24H  methylPREDNISolone, 4 mg, Oral, Tonight  [START ON 5/19/2022] methylPREDNISolone, 4 mg, Oral, Before Breakfast  nicotine, 1 patch, Transdermal, Q24H  sodium chloride, 10 mL, Intravenous, Q12H  thiamine, 100 mg, Oral, Daily    Infusions   Diet  Diet Regular       Assessment/Plan     Active Hospital Problems    Diagnosis  POA   • **Intractable back pain [M54.9]  Yes   • Elevated BP without diagnosis of hypertension [R03.0]  No   • Sinus tachycardia [R00.0]  No   • Hypokalemia [E87.6]  Yes   • Tobacco abuse [Z72.0]  Yes   • Alcohol withdrawal (HCC) [F10.239]  Yes   • Heart burn [R12]  Yes      Resolved Hospital Problems   No resolved problems to display.       Mr. Jeter is a 46 year old male who presented to the hospital with complaints of severe back pain. Hospital course complicated by severe alcohol withdrawal requiring transfer to ICU.      · Severe alcohol withdrawal requiring high doses of Ativan as well as transfer to the ICU and Precedex. Off Precedex and transferred out of ICU. CIWA scores zero but was still on scheduled Ativan every 8h. Stopped scheduled Ativan yesterday. Continue PRN Ativan per CIWA protocol--has not required any yet. Continue vitamin replacement. Access following.    · Sinus Tachycardia: suspect due to withdrawal. Asymptomatic. Better today so far. Continue to monitor on telemetry.    · Elevated BP w/o dx of HTN: supsect due to withdrawal, BPs looking better on scheduled Clonidine (started for withdrawal), now weaning him off slowly     · Intractable  back pain: MRI now returned with some DDD but no acute fracture/compression/disc herniation. L4-5 with shortened pedicles and mild central canal stenosis. Nothing surgical it seems. Orthopedic surgery was consulted--they agree with supportive care with pain meds, PT, Medrol, and gabapentin. Have dc'd IV Dilaudid today. Continue po Lortab at sl higher dose. PT evaluation noted. Very weak. SNF recommended at dc.     · Nicotine patch    · Monitoring electrolytes    · Monitoring BG      · Lovenox for DVT prophylaxis.  · Full code.  · Discussed with patient, nursing staff, CCP and care team on multidisciplinary rounds.  · Anticipate discharge to SNU facility whenever arrangements can be made.      Kehinde Hi MD  [facility]  05/18/22  12:11 EDT

## 2022-05-18 NOTE — PLAN OF CARE
The pt participated in therapy this AM. He continues to require assist for all toileting, bathing and dressing ADL's. He is able to stand with Min A x2 - he initially tolerated side steps along the EOB. After a seated rest break he slowly mobilized into the hallway where he sat in a bedside chair pulled up. He remains very weak with knee buckling present. C/O nerve pain in his LLE. SNF planned at d/c.    Patient was not wearing a face mask during this therapy encounter. Therapist used appropriate personal protective equipment including mask and gloves. Mask used was standard procedure mask. Appropriate PPE was worn during the entire therapy session. Hand hygiene was completed before and after therapy session. Patient is not in enhanced droplet precautions.

## 2022-05-18 NOTE — CASE MANAGEMENT/SOCIAL WORK
Continued Stay Note  [facility]     Patient Name: Bogdan Jeter  MRN: [id number]  Today's Date: 5/18/2022    Admit Date: 5/9/2022     Discharge Plan     Row Name 05/18/22 1648       Plan    Plan SNF referrals pending for short term rehab    Patient/Family in Agreement with Plan yes    Plan Comments Spoke with Van/Ruth and she will evaluate and let CCP know. CCP called Daysi/Desiree [phone] and she will evaluate. Called Daniel/Deanna and left  requesting call back. Desi[facility], Madie[facility], [facility] [place], [place], [facility], [facility] unable to accept pts insurance. [place],  [facility], [facility], and [facility] no medicaid bed available. [facility] unable to accept due araseli use. [facility] unable to meet pts needs. CCP to follow up on referral outcomes tomorrow. Tiara BUSTOS/CCP               Discharge Codes    No documentation.               Expected Discharge Date and Time     Expected Discharge Date Expected Discharge Time    May 18, 2022             Dian Rodriguez, JULI

## 2022-05-18 NOTE — PLAN OF CARE
"Goal Outcome Evaluation:  Plan of Care Reviewed With: patient        Progress: improving  CIWA remains a zero. Able to make needs known. HE continues to experience generalized weakness. He needs assistance with ADL\"s and transfers. He refuses to turn and reposition at time but is encouraged.      " Refills sent

## 2022-05-18 NOTE — THERAPY TREATMENT NOTE
Patient Name: Bogdan Jeter  : 1975    MRN: [id number]                              Today's Date: 2022       Admit Date: 2022    Visit Dx:     ICD-10-CM ICD-9-CM   1. Intractable back pain  M54.9 724.5   2. Immobility  Z74.09 799.89     Patient Active Problem List   Diagnosis   • Intractable back pain   • Hypokalemia   • Tobacco abuse   • Alcohol withdrawal (HCC)   • Heart burn   • Elevated BP without diagnosis of hypertension   • Sinus tachycardia     No past medical history on file.  No past surgical history on file.   General Information     Row Name 22 1147          Physical Therapy Time and Intention    Document Type therapy note (daily note)  -CF     Mode of Treatment physical therapy;co-treatment  -CF     Row Name 22 1147          General Information    Patient Profile Reviewed yes  -CF     Existing Precautions/Restrictions fall;seizures  -CF     Row Name 22 1147          Cognition    Orientation Status (Cognition) oriented to;person;place;time  -CF     Row Name 22 1147          Safety Issues, Functional Mobility    Safety Issues Affecting Function (Mobility) insight into deficits/self-awareness;judgment;awareness of need for assistance;problem-solving;sequencing abilities;impulsivity;safety precautions follow-through/compliance  -CF     Impairments Affecting Function (Mobility) balance;cognition;coordination;endurance/activity tolerance;motor control;postural/trunk control;strength;pain  -CF           User Key  (r) = Recorded By, (t) = Taken By, (c) = Cosigned By    Initials Name Provider Type    CF Blanca Toledo PT Physical Therapist               Mobility     Row Name 22 1147          Bed Mobility    Bed Mobility supine-sit;sit-supine  -CF     Supine-Sit [place] (Bed Mobility) minimum assist (75% patient effort);verbal cues;nonverbal cues (demo/gesture)  -CF     Sit-Supine [place] (Bed Mobility) contact guard;verbal cues;1 person assist  -CF      Assistive Device (Bed Mobility) bed rails;head of bed elevated  -CF     Row Name 05/18/22 1147          Bed-Chair Transfer    Bed-Chair [place] (Transfers) minimum assist (75% patient effort);2 person assist;verbal cues  -CF     Row Name 05/18/22 1147          Sit-Stand Transfer    Sit-Stand [place] (Transfers) minimum assist (75% patient effort);2 person assist;verbal cues  -CF     Assistive Device (Sit-Stand Transfers) walker, front-wheeled  -CF     Row Name 05/18/22 1147          Gait/Stairs (Locomotion)    [place] Level (Gait) minimum assist (75% patient effort);2 person assist  -CF     Assistive Device (Gait) walker, front-wheeled  -CF     Distance in Feet (Gait) side steps R and L at EOB, then 25' x2 with seated rest in between  -CF     Deviations/Abnormal Patterns (Gait) rachel decreased;gait speed decreased  -CF     Bilateral Gait Deviations knee buckling bilaterally;forward flexed posture  -CF     Comment, (Gait/Stairs) 2 instances of B knee buckling but able to re-extend knees with cues  -CF           User Key  (r) = Recorded By, (t) = Taken By, (c) = Cosigned By    Initials Name Provider Type    CF Blanca Toledo PT Physical Therapist               Obj/Interventions     Row Name 05/18/22 1150          Balance    Static Standing Balance minimal assist  -CF     Dynamic Standing Balance minimal assist;moderate assist;2-person assist  -CF     Position/Device Used, Standing Balance supported;walker, front-wheeled  -CF           User Key  (r) = Recorded By, (t) = Taken By, (c) = Cosigned By    Initials Name Provider Type    CF Blanca Toledo PT Physical Therapist               Goals/Plan    No documentation.                Clinical Impression     Row Name 05/18/22 1151          Pain    Pretreatment Pain Rating 5/10  -CF     Posttreatment Pain Rating 5/10  -CF     Pain Location - Side/Orientation Left  -CF     Pain Location lower  -CF     Pain Location - back;hip  -CF     Row Name  05/18/22 1151          Plan of Care Review    Plan of Care Reviewed With patient  -CF     Outcome Evaluation Pt seen for PT and OT session this AM. Pt progressing with mobility as he required less assist for mobility, transfers and ambulation. Pt did have 2 instances of knee buckling but was able to recover with assist. Did require seated rest break due to fatigue with ambulation. PT will progress as able.  -CF     Row Name 05/18/22 1151          Vital Signs    O2 Delivery Pre Treatment room air  -CF     O2 Delivery Intra Treatment room air  -CF     O2 Delivery Post Treatment room air  -CF     Row Name 05/18/22 1151          Positioning and Restraints    Pre-Treatment Position in bed  -CF     Post Treatment Position bed  -CF     In Bed notified nsg;call light within reach;encouraged to call for assist;exit alarm on;fowlers  -CF           User Key  (r) = Recorded By, (t) = Taken By, (c) = Cosigned By    Initials Name Provider Type    Blanca Chavira, NICK Physical Therapist               Outcome Measures     Row Name 05/18/22 1153          How much help from another person do you currently need...    Turning from your back to your side while in flat bed without using bedrails? 3  -CF     Moving from lying on back to sitting on the side of a flat bed without bedrails? 3  -CF     Moving to and from a bed to a chair (including a wheelchair)? 2  -CF     Standing up from a chair using your arms (e.g., wheelchair, bedside chair)? 2  -CF     Climbing 3-5 steps with a railing? 1  -CF     To walk in hospital room? 2  -CF     AM-PAC 6 Clicks Score (PT) 13  -CF     Highest level of mobility 4 --> Transferred to chair/commode  -CF     Row Name 05/18/22 1153          Functional Assessment    Outcome Measure Options AM-PAC 6 Clicks Basic Mobility (PT)  -CF           User Key  (r) = Recorded By, (t) = Taken By, (c) = Cosigned By    Initials Name Provider Type    Blanca Chavira PT Physical Therapist                              Physical Therapy Education                 Title: PT OT SLP Therapies (Done)     Topic: Physical Therapy (Done)     Point: Mobility training (Done)     Learning Progress Summary           Patient Acceptance, E, VU,NR by CF at 5/18/2022 1154    Acceptance, E, VU,NR by  at 5/18/2022 0257    Acceptance, E, VU,Bed IU by JS at 5/17/2022 0257    Acceptance, E, NR by CF at 5/16/2022 1334                   Point: Home exercise program (Done)     Learning Progress Summary           Patient Acceptance, E, VU,NR by CF at 5/18/2022 1154    Acceptance, E, VU,NR by JS at 5/18/2022 0257    Acceptance, E, VU,Bed IU by JS at 5/17/2022 0257                   Point: Body mechanics (Done)     Learning Progress Summary           Patient Acceptance, E, VU,NR by CF at 5/18/2022 1154    Acceptance, E, VU,NR by  at 5/18/2022 0257    Acceptance, E, VU,Bed IU by  at 5/17/2022 0257    Acceptance, E, NR by CF at 5/16/2022 1334                   Point: Precautions (Done)     Learning Progress Summary           Patient Acceptance, E, VU,NR by CF at 5/18/2022 1154    Acceptance, E, VU,NR by  at 5/18/2022 0257    Acceptance, E, VU,Bed IU by  at 5/17/2022 0257    Acceptance, E, NR by  at 5/16/2022 1334                               User Key     Initials Effective Dates Name Provider Type Discipline     06/16/21 -  Blanca Toledo PT Physical Therapist PT     09/10/21 -  Christy Santiago, RN Registered Nurse Nurse              PT Recommendation and Plan  Planned Therapy Interventions (PT): balance training, bed mobility training, gait training, ROM (range of motion), strengthening, patient/family education, transfer training  Plan of Care Reviewed With: patient  Outcome Evaluation: Pt seen for PT and OT session this AM. Pt progressing with mobility as he required less assist for mobility, transfers and ambulation. Pt did have 2 instances of knee buckling but was able to recover with assist. Did require seated rest break  due to fatigue with ambulation. PT will progress as able.     Time Calculation:    PT Charges     Row Name 05/18/22 1146             Time Calculation    Start Time 1031  -CF      Stop Time 1047  -CF      Time Calculation (min) 16 min  -CF      PT Received On 05/18/22  -CF      PT - Next Appointment 05/19/22  -CF            User Key  (r) = Recorded By, (t) = Taken By, (c) = Cosigned By    Initials Name Provider Type    CF Blanca Toledo, PT Physical Therapist              Therapy Charges for Today     Code Description Service Date Service Provider Modifiers Qty    81540110531  PT THERAPEUTIC ACT EA 15 MIN 5/18/2022 Blanca Toledo, PT GP 1    [id number]  PT THER SUPP EA 15 MIN 5/18/2022 Blanca Toledo, PT GP 1          PT G-Codes  Outcome Measure Options: AM-PAC 6 Clicks Basic Mobility (PT)  AM-PAC 6 Clicks Score (PT): 13  AM-PAC 6 Clicks Score (OT): 14  Modified Ruffin Scale: 4 - Moderately severe disability.  Unable to walk without assistance, and unable to attend to own bodily needs without assistance.    Blanca Toledo PT  5/18/2022

## 2022-05-18 NOTE — THERAPY TREATMENT NOTE
Patient Name: Bogdan Jeter  : 1975    MRN: [id number]                              Today's Date: 2022       Admit Date: 2022    Visit Dx:     ICD-10-CM ICD-9-CM   1. Intractable back pain  M54.9 724.5   2. Immobility  Z74.09 799.89     Patient Active Problem List   Diagnosis   • Intractable back pain   • Hypokalemia   • Tobacco abuse   • Alcohol withdrawal (HCC)   • Heart burn   • Elevated BP without diagnosis of hypertension   • Sinus tachycardia     No past medical history on file.  No past surgical history on file.   General Information     Row Name 22 1210          OT Time and Intention    Document Type therapy note (daily note)  -RB     Mode of Treatment co-treatment;occupational therapy;physical therapy  -RB     Row Name 22 1210          General Information    Patient Profile Reviewed yes  -RB     Existing Precautions/Restrictions fall;seizures  -RB     Row Name 22 1210          Cognition    Orientation Status (Cognition) oriented to;person;place;time  -RB           User Key  (r) = Recorded By, (t) = Taken By, (c) = Cosigned By    Initials Name Provider Type    RB Sobeida Bustos OT Occupational Therapist                 Mobility/ADL's     Row Name 22 1210          Bed Mobility    Bed Mobility sit-supine  -RB     Sit-Supine [place] (Bed Mobility) minimum assist (75% patient effort);verbal cues;1 person assist  -RB     Assistive Device (Bed Mobility) bed rails;leg   -RB     Row Name 22 1210          Transfers    Transfers sit-stand transfer;stand-sit transfer;bed-chair transfer  -RB     Comment, (Transfers) knee buckling remains, able to tolerate side steps along the EOB. Seated rest breaks provided. Impulsive/jerky movements present. Seated rest breaks provided PRN.  -RB     Bed-Chair [place] (Transfers) minimum assist (75% patient effort);2 person assist;nonverbal cues (demo/gesture);verbal cues  -RB     Assistive Device (Bed-Chair Transfers)  walker, front-wheeled  -RB     Sit-Stand [place] (Transfers) minimum assist (75% patient effort);2 person assist;nonverbal cues (demo/gesture);verbal cues  -RB     Stand-Sit [place] (Transfers) minimum assist (75% patient effort);2 person assist;nonverbal cues (demo/gesture);verbal cues  -RB     Row Name 05/18/22 1210          Sit-Stand Transfer    Assistive Device (Sit-Stand Transfers) walker, front-wheeled  -RB     Row Name 05/18/22 1210          Stand-Sit Transfer    Assistive Device (Stand-Sit Transfers) walker, front-wheeled  -RB     Row Name 05/18/22 1210          Functional Mobility    Functional Mobility- Ind. Level minimum assist (75% patient effort);2 person assist required;verbal cues required;supervision required  -RB     Functional Mobility- Safety Issues balance decreased during turns  -RB     Functional Mobility- Comment very slow pacing, cues to keep his head up and shoulders back.  -     Row Name 05/18/22 1210          Activities of Daily Living    BADL Assessment/Intervention --  Pt is requiring assist for all dressing, toileting and bathing ADL's from bed level. He remains with BUE tremors limiting his independence with self feeding/grooming coordination.  -RB           User Key  (r) = Recorded By, (t) = Taken By, (c) = Cosigned By    Initials Name Provider Type    RB Sobeida Bustos OT Occupational Therapist               Obj/Interventions     Row Name 05/18/22 1231          Balance    Static Sitting Balance supervision  -RB     Dynamic Sitting Balance supervision  -RB     Static Standing Balance contact guard;2-person assist;minimal assist;verbal cues;non-verbal cues (demo/gesture)  -RB     Dynamic Standing Balance minimal assist;2-person assist;verbal cues;non-verbal cues (demo/gesture)  -RB     Position/Device Used, Standing Balance walker, front-wheeled  -RB     Comment, Balance While standing at the EOB the pt was able to slowly release one UE from the walker at a time for  dynamic reaching x3 reps each arm. Knee buckling present on the 3rd rep and he was assisted into sitting  -RB           User Key  (r) = Recorded By, (t) = Taken By, (c) = Cosigned By    Initials Name Provider Type    Sobeida Fasut OT Occupational Therapist               Goals/Plan    No documentation.                Clinical Impression     Row Name 05/18/22 1233          Pain Assessment    Pretreatment Pain Rating 4/10  -RB     Posttreatment Pain Rating 4/10  -RB     Pain Location - Side/Orientation Left  -RB     Pain Location lower  -RB     Pain Location - back;hip  -RB     Pain Intervention(s) Repositioned;Rest;Medication (See MAR)  -RB     Row Name 05/18/22 1233          Plan of Care Review    Plan of Care Reviewed With patient  -RB     Progress improving  -RB     Row Name 05/18/22 1233          Therapy Assessment/Plan (OT)    Rehab Potential (OT) good, to achieve stated therapy goals  -RB     Criteria for Skilled Therapeutic Interventions Met (OT) yes;skilled treatment is necessary  -RB     Therapy Frequency (OT) 5 times/wk  -RB     Row Name 05/18/22 1233          Therapy Plan Review/Discharge Plan (OT)    Anticipated Discharge Disposition (OT) skilled nursing facility  -RB     Row Name 05/18/22 1233          Vital Signs    O2 Delivery Pre Treatment room air  -RB     O2 Delivery Intra Treatment room air  -RB     O2 Delivery Post Treatment room air  -RB     Pre Patient Position Supine  -RB     Intra Patient Position Standing  -RB     Post Patient Position Supine  -RB     Row Name 05/18/22 1233          Positioning and Restraints    Pre-Treatment Position in bed  -RB     Post Treatment Position bed  -RB     In Bed notified nsg;supine;call light within reach;encouraged to call for assist;exit alarm on;legs elevated  -RB           User Key  (r) = Recorded By, (t) = Taken By, (c) = Cosigned By    Initials Name Provider Type    Sobeida Faust OT Occupational Therapist               Outcome Measures      Row Name 05/18/22 1153          How much help from another person do you currently need...    Turning from your back to your side while in flat bed without using bedrails? 3  -CF     Moving from lying on back to sitting on the side of a flat bed without bedrails? 3  -CF     Moving to and from a bed to a chair (including a wheelchair)? 2  -CF     Standing up from a chair using your arms (e.g., wheelchair, bedside chair)? 2  -CF     Climbing 3-5 steps with a railing? 1  -CF     To walk in hospital room? 2  -CF     AM-PAC 6 Clicks Score (PT) 13  -CF     Highest level of mobility 4 --> Transferred to chair/commode  -CF     Row Name 05/18/22 1153          Functional Assessment    Outcome Measure Options AM-PAC 6 Clicks Basic Mobility (PT)  -CF           User Key  (r) = Recorded By, (t) = Taken By, (c) = Cosigned By    Initials Name Provider Type    CF Blanca Toledo PT Physical Therapist                Occupational Therapy Education                 Title: PT OT SLP Therapies (Done)     Topic: Occupational Therapy (Done)     Point: ADL training (Done)     Description:   Instruct learner(s) on proper safety adaptation and remediation techniques during self care or transfers.   Instruct in proper use of assistive devices.              Learning Progress Summary           Patient Acceptance, E, VU,NR by JS at 5/18/2022 0257    Acceptance, E, VU,Bed IU by JS at 5/17/2022 0257                   Point: Home exercise program (Done)     Description:   Instruct learner(s) on appropriate technique for monitoring, assisting and/or progressing therapeutic exercises/activities.              Learning Progress Summary           Patient Acceptance, E, VU,NR by JS at 5/18/2022 0257    Acceptance, E, VU,Bed IU by JS at 5/17/2022 0257                   Point: Precautions (Done)     Description:   Instruct learner(s) on prescribed precautions during self-care and functional transfers.              Learning Progress Summary            Patient Acceptance, E, VU,NR by  at 5/18/2022 0257    Acceptance, E, VU,Bed IU by  at 5/17/2022 0257                   Point: Body mechanics (Done)     Description:   Instruct learner(s) on proper positioning and spine alignment during self-care, functional mobility activities and/or exercises.              Learning Progress Summary           Patient Acceptance, E, VU,NR by  at 5/18/2022 0257    Acceptance, E, VU,Bed IU by  at 5/17/2022 0257                               User Key     Initials Effective Dates Name Provider Type Discipline     09/10/21 -  Christy Santiago, RN Registered Nurse Nurse              OT Recommendation and Plan  Planned Therapy Interventions (OT): transfer/mobility retraining, strengthening exercise, ROM/therapeutic exercise, activity tolerance training, adaptive equipment training, BADL retraining, functional balance retraining, occupation/activity based interventions, patient/caregiver education/training, cognitive/visual perception retraining, neuromuscular control/coordination retraining  Therapy Frequency (OT): 5 times/wk  Plan of Care Review  Plan of Care Reviewed With: patient  Progress: improving  Outcome Evaluation: Pt admitted to [facility] 2/2 to a pinch in his back causing falls and weakness at home. Pt diagnosed with L4-5 vertebral bodies but not concerning for significant injury as well as ETOH withdrawal. Pmhx includes HTN and tobacco use. 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 He does not have family support and reports independence with all ADL's and mobility. Today, the pt is oriented x3 with generalized confusion present. Min A for bed mobility. CGA->Mod A for sitting balance at the EOB due to poor trunk control and weakness. Max A for LBD due to his impaired sitting balance and coordination. Ataxic movements in BUE's present. Max A x2 to stand with knee buckling present in both legs. He is very unsteady and unsafe  to attempt further mobility/transfers/OOB ADL's at this time. SNF recommended pending progress.      Patient was not wearing a face mask during this therapy encounter. Therapist used appropriate personal protective equipment including mask and gloves.  Mask used was standard procedure mask. Appropriate PPE was worn during the entire therapy session. Hand hygiene was completed before and after therapy session. Patient is not in enhanced droplet precautions.     Time Calculation:    Time Calculation- OT     Row Name 05/18/22 1210             Time Calculation- OT    OT Start Time 1030  -RB      OT Stop Time 1058  -RB      OT Time Calculation (min) 28 min  -RB      Total Timed Code Minutes- OT 28 minute(s)  -RB      OT Received On 05/18/22  -RB      OT - Next Appointment 05/19/22  -RB              Timed Charges    55137 - OT Therapeutic Activity Minutes 28  -RB              Total Minutes    Timed Charges Total Minutes 28  -RB       Total Minutes 28  -RB            User Key  (r) = Recorded By, (t) = Taken By, (c) = Cosigned By    Initials Name Provider Type    RB Sobeida Bustos OT Occupational Therapist              Therapy Charges for Today     Code Description Service Date Service Provider Modifiers Qty    [id number]  OT THERAPEUTIC ACT EA 15 MIN 5/18/2022 Sobeida Bustos OT GO 1               Sobeida Bustos OT  5/18/2022

## 2022-05-18 NOTE — PLAN OF CARE
Goal Outcome Evaluation:  Plan of Care Reviewed With: patient           Outcome Evaluation: Pt seen for PT and OT session this AM. Pt progressing with mobility as he required less assist for mobility, transfers and ambulation. Pt did have 2 instances of knee buckling but was able to recover with assist. Did require seated rest break due to fatigue with ambulation. PT will progress as able.

## 2022-05-18 NOTE — PLAN OF CARE
Goal Outcome Evaluation:  Plan of Care Reviewed With: patient        Progress: no change  Outcome Evaluation: CIWA remains 2 and below. a&ox4. IV pain meds d/c'd. Will CTM the rest of my shift.

## 2022-05-19 LAB
ANION GAP SERPL CALCULATED.3IONS-SCNC: 11 MMOL/L (ref 5–15)
BUN SERPL-MCNC: 9 MG/DL (ref 6–20)
BUN/CREAT SERPL: 13.6 (ref 7–25)
CALCIUM SPEC-SCNC: 9.8 MG/DL (ref 8.6–10.5)
CHLORIDE SERPL-SCNC: 95 MMOL/L (ref 98–107)
CO2 SERPL-SCNC: 23 MMOL/L (ref 22–29)
CREAT SERPL-MCNC: 0.66 MG/DL (ref 0.76–1.27)
DEPRECATED RDW RBC AUTO: 46.8 FL (ref 37–54)
EGFRCR SERPLBLD CKD-EPI 2021: 116.4 ML/MIN/1.73
ERYTHROCYTE [DISTWIDTH] IN BLOOD BY AUTOMATED COUNT: 13.4 % (ref 12.3–15.4)
GLUCOSE SERPL-MCNC: 98 MG/DL (ref 65–99)
HCT VFR BLD AUTO: 40.8 % (ref 37.5–51)
HGB BLD-MCNC: 14.3 G/DL (ref 13–17.7)
MAGNESIUM SERPL-MCNC: 2.2 MG/DL (ref 1.6–2.6)
MCH RBC QN AUTO: 33.7 PG (ref 26.6–33)
MCHC RBC AUTO-ENTMCNC: 35 G/DL (ref 31.5–35.7)
MCV RBC AUTO: 96.2 FL (ref 79–97)
OSMOLALITY UR: 772 MOSM/KG
PLATELET # BLD AUTO: 386 10*3/MM3 (ref 140–450)
PMV BLD AUTO: 9.9 FL (ref 6–12)
POTASSIUM SERPL-SCNC: 5 MMOL/L (ref 3.5–5.2)
RBC # BLD AUTO: 4.24 10*6/MM3 (ref 4.14–5.8)
SODIUM SERPL-SCNC: 129 MMOL/L (ref 136–145)
SODIUM UR-SCNC: 198 MMOL/L
WBC NRBC COR # BLD: 8.6 10*3/MM3 (ref 3.4–10.8)

## 2022-05-19 PROCEDURE — 85027 COMPLETE CBC AUTOMATED: CPT | Performed by: HOSPITALIST

## 2022-05-19 PROCEDURE — 25010000002 ENOXAPARIN PER 10 MG: Performed by: INTERNAL MEDICINE

## 2022-05-19 PROCEDURE — 99222 1ST HOSP IP/OBS MODERATE 55: CPT | Performed by: NURSE PRACTITIONER

## 2022-05-19 PROCEDURE — 97110 THERAPEUTIC EXERCISES: CPT

## 2022-05-19 PROCEDURE — 93010 ELECTROCARDIOGRAM REPORT: CPT | Performed by: INTERNAL MEDICINE

## 2022-05-19 PROCEDURE — 97530 THERAPEUTIC ACTIVITIES: CPT

## 2022-05-19 PROCEDURE — 80048 BASIC METABOLIC PNL TOTAL CA: CPT | Performed by: HOSPITALIST

## 2022-05-19 PROCEDURE — 84300 ASSAY OF URINE SODIUM: CPT | Performed by: STUDENT IN AN ORGANIZED HEALTH CARE EDUCATION/TRAINING PROGRAM

## 2022-05-19 PROCEDURE — 93005 ELECTROCARDIOGRAM TRACING: CPT | Performed by: STUDENT IN AN ORGANIZED HEALTH CARE EDUCATION/TRAINING PROGRAM

## 2022-05-19 PROCEDURE — 63710000001 METHYLPREDNISOLONE 4 MG TABLET THERAPY PACK 21 EACH DISP PACK: Performed by: INTERNAL MEDICINE

## 2022-05-19 PROCEDURE — 83935 ASSAY OF URINE OSMOLALITY: CPT | Performed by: STUDENT IN AN ORGANIZED HEALTH CARE EDUCATION/TRAINING PROGRAM

## 2022-05-19 PROCEDURE — 83735 ASSAY OF MAGNESIUM: CPT | Performed by: HOSPITALIST

## 2022-05-19 RX ADMIN — METHYLPREDNISOLONE 4 MG: 4 TABLET ORAL at 06:30

## 2022-05-19 RX ADMIN — GABAPENTIN 100 MG: 100 CAPSULE ORAL at 20:43

## 2022-05-19 RX ADMIN — GABAPENTIN 100 MG: 100 CAPSULE ORAL at 08:34

## 2022-05-19 RX ADMIN — ENOXAPARIN SODIUM 40 MG: 100 INJECTION SUBCUTANEOUS at 20:43

## 2022-05-19 RX ADMIN — NICOTINE 1 PATCH: 21 PATCH, EXTENDED RELEASE TRANSDERMAL at 09:00

## 2022-05-19 RX ADMIN — HYDROCODONE BITARTRATE AND ACETAMINOPHEN 1 TABLET: 10; 325 TABLET ORAL at 05:25

## 2022-05-19 RX ADMIN — HYDROCODONE BITARTRATE AND ACETAMINOPHEN 1 TABLET: 10; 325 TABLET ORAL at 18:29

## 2022-05-19 RX ADMIN — CLONIDINE HYDROCHLORIDE 0.1 MG: 0.1 TABLET ORAL at 08:35

## 2022-05-19 RX ADMIN — SODIUM CHLORIDE 1000 ML: 9 INJECTION, SOLUTION INTRAVENOUS at 17:05

## 2022-05-19 RX ADMIN — Medication 10 ML: at 08:35

## 2022-05-19 RX ADMIN — HYDROCODONE BITARTRATE AND ACETAMINOPHEN 1 TABLET: 10; 325 TABLET ORAL at 14:02

## 2022-05-19 RX ADMIN — LIDOCAINE 2 PATCH: 50 PATCH TOPICAL at 08:35

## 2022-05-19 RX ADMIN — Medication 10 ML: at 21:00

## 2022-05-19 RX ADMIN — HYDROCODONE BITARTRATE AND ACETAMINOPHEN 1 TABLET: 10; 325 TABLET ORAL at 09:22

## 2022-05-19 RX ADMIN — FOLIC ACID 1 MG: 1 TABLET ORAL at 08:35

## 2022-05-19 RX ADMIN — Medication 100 MG: at 08:35

## 2022-05-19 RX ADMIN — METOPROLOL TARTRATE 25 MG: 25 TABLET, FILM COATED ORAL at 17:04

## 2022-05-19 RX ADMIN — METOPROLOL TARTRATE 25 MG: 25 TABLET, FILM COATED ORAL at 20:43

## 2022-05-19 NOTE — PLAN OF CARE
Goal Outcome Evaluation:  Plan of Care Reviewed With: patient        Progress: improving  Outcome Evaluation: Pt in bed at beg of session. Pt sat up to EOB req SBA. Pt stood req CGA and use of fww. Pt then amb 35' req CGA and use of fww. Pt very slow w/ antalgic step to pattern noted as well as heavy use of B UE for support. Pt performed BLE ther ex in chair for strengthening. RN notified that pt was in chair. PT will prog as pt vasquez.    Patient was intermittently wearing a face mask during this therapy encounter. Therapist used appropriate personal protective equipment including eye protection, mask, and gloves.  Mask used was standard procedure mask. Appropriate PPE was worn during the entire therapy session. Hand hygiene was completed before and after therapy session. Patient is not in enhanced droplet precautions.

## 2022-05-19 NOTE — CONSULTS
Patient Name: Bogdan Jeter  :1975  46 y.o.    Date of Admission: 2022  Date of Consultation:  22  Encounter Provider: JESSE Yang  Place of Service: [facility] CARDIOLOGY  Referring Provider: Cony Damian MD  Patient Care Team:  Provider, No Known as PCP - General      Chief complaint: back pain, alcohol withdrawal    Reason for consult : sinus tachycardia    History of Present Illness:Mr. Jeter is a 46 year old man with no prior medical history. He is a current every day smoker and uses alcohol daily. He has a history of methamphetamine use.     He presented to the emergency room on  with complaints of back pain and left leg weakness. Physical therapy and symptomatic management recommended by ortho.  He unfortunately went into pretty severe alcohol withdrawal despite frequent intervention and was transferred to the intensive care unit on . HR 's sinus tach at the time. He was hypertensive during this time as well. He was started on clonidine on 5/15 (0.1mg TID) and decreased to 0.1mg BID on  and stopped this morning. Last dose this AM 8:35.     He was treated in the ICU until  at which time he was doing better and transferred back out to telemetry. His HR and blood pressure improved. He is on a steroid dose pack for back pain.     Patient appears to be out of alcohol withdrawal. He is alert and oriented. He denies palpitations or heart racing. He has not had any shortness of breath, PND, orthopnea, chest pain or pressure. No syncope or near syncope.     Serum sodium is low at 130. Blood work is otherwise unremarkable. He is not anemic. Normal WBC count. No fever. He is eating and drinking well. BP is on the low side. TSH normal on 22. He has constant pain but appears comfrotable currently. He is resting in bed and feels well.     HR was noted to be in the 140's while resting in bed for which we have been asked to see. EKG  shows sinus tach with lateral twave inversions.     No past medical history on file.    No past surgical history on file.      Prior to Admission medications    Not on File       No Known Allergies    Social History     Socioeconomic History   • Marital status: Single   Tobacco Use   • Smoking status: Current Every Day Smoker     Packs/day: 1.00     Years: 15.00     Pack years: 15.00     Types: Cigarettes   Vaping Use   • Vaping Use: Never used   Substance and Sexual Activity   • Alcohol use: Yes     Alcohol/week: 5.0 standard drinks     Types: 5 Cans of beer per week     Comment: daily   • Drug use: Yes     Frequency: 1.0 times per week     Types: Marijuana   • Sexual activity: Defer       No family history on file.    REVIEW OF SYSTEMS:   All systems reviewed.  Pertinent positives identified in HPI.  All other systems are negative.      Objective:     Vitals:    05/18/22 1942 05/18/22 2341 05/19/22 0734 05/19/22 1344   BP: 118/87 115/95 135/97 100/42   BP Location: Right arm Right arm Right arm Right leg   Patient Position: Sitting Lying Lying Lying   Pulse: 119 98 103 (!) 123   Resp: 18 18 18 18   Temp: 98.6 °F (37 °C) 98.5 °F (36.9 °C) 98.5 °F (36.9 °C) 98.5 °F (36.9 °C)   TempSrc: Oral Oral Oral Oral   SpO2: 93% 98% 98% 95%   Weight:       Height:         Body mass index is 24.1 kg/m².    Physical Exam:  Constitutional: He is oriented to person, place, and time. He appears well-developed. He does not appear ill.   HENT:   Head: Normocephalic and atraumatic. Head is without contusion.   Right Ear: Hearing normal. No drainage.   Left Ear: Hearing normal. No drainage.   Nose: No nasal deformity. No epistaxis.   Eyes: Lids are normal. Right eye exhibits no exudate. Left eye exhibits no exudate.  Neck: No JVD present. Carotid bruit is not present. No tracheal deviation present. No thyroid mass and no thyromegaly present.   Cardiovascular: Normal rate, regular rhythm and normal heart sounds.    Pulses:        Posterior tibial pulses are 2+ on the right side, and 2+ on the left side.   Pulmonary/Chest: Effort normal and breath sounds normal.   Abdominal: Soft. Normal appearance and bowel sounds are normal. There is no tenderness.   Musculoskeletal: Normal range of motion.        Right shoulder: He exhibits no deformity.        Left shoulder: He exhibits no deformity.   Neurological: He is alert and oriented to person, place, and time. He has normal strength.   Skin: Skin is warm, dry and intact. No rash noted.   Psychiatric: He has a normal mood and affect. His behavior is normal. Thought content normal.   Vitals reviewed      Lab Review:     Results from last 7 days   Lab Units 05/19/22  0529 05/18/22  1133   SODIUM mmol/L 129* 133*   POTASSIUM mmol/L 5.0 4.4   CHLORIDE mmol/L 95* 98   CO2 mmol/L 23.0 22.8   BUN mg/dL 9 9   CREATININE mg/dL 0.66* 0.59*   CALCIUM mg/dL 9.8 10.0   BILIRUBIN mg/dL  --  0.3   ALK PHOS U/L  --  101   ALT (SGPT) U/L  --  30   AST (SGOT) U/L  --  35   GLUCOSE mg/dL 98 125*         Results from last 7 days   Lab Units 05/19/22  0529   WBC 10*3/mm3 8.60   HEMOGLOBIN g/dL 14.3   HEMATOCRIT % 40.8   PLATELETS 10*3/mm3 386         Results from last 7 days   Lab Units 05/19/22  0529   MAGNESIUM mg/dL 2.2                  Current Facility-Administered Medications:   •  acetaminophen (TYLENOL) tablet 650 mg, 650 mg, Oral, Q4H PRN **OR** acetaminophen (TYLENOL) 160 MG/5ML solution 650 mg, 650 mg, Oral, Q4H PRN **OR** acetaminophen (TYLENOL) suppository 650 mg, 650 mg, Rectal, Q4H PRN, Vic Mercedes Jr., MD  •  aluminum-magnesium hydroxide-simethicone (MAALOX MAX) 400-400-40 MG/5ML suspension 15 mL, 15 mL, Oral, Q4H PRN, Vic Mercedes Jr., MD, 15 mL at 05/16/22 0619  •  dextrose (D50W) (25 g/50 mL) IV injection 25 g, 25 g, Intravenous, Q15 Min PRN, Vic Mercedes Jr., MD, 25 g at 05/12/22 1120  •  dextrose (GLUTOSE) oral gel 15 g, 15 g, Oral, Q15 Min PRN, Vic Mercedes Jr., MD  •   Enoxaparin Sodium (LOVENOX) syringe 40 mg, 40 mg, Subcutaneous, Nightly, Vic Mercedes Jr., MD, 40 mg at 05/18/22 2131  •  folic acid (FOLVITE) tablet 1 mg, 1 mg, Oral, Daily, Cristian Lopez MD, 1 mg at 05/19/22 0835  •  gabapentin (NEURONTIN) capsule 100 mg, 100 mg, Oral, Q12H, Vic Mercedes Jr., MD, 100 mg at 05/19/22 0834  •  glucagon (human recombinant) (GLUCAGEN DIAGNOSTIC) injection 1 mg, 1 mg, Subcutaneous, Q15 Min PRN, Vic Mercedes Jr., MD  •  HYDROcodone-acetaminophen (NORCO)  MG per tablet 1 tablet, 1 tablet, Oral, Q4H PRN, Kehinde Hi MD, 1 tablet at 05/19/22 0922  •  lidocaine (LIDODERM) 5 % 2 patch, 2 patch, Transdermal, Q24H, Vic Mercedes Jr., MD, 2 patch at 05/19/22 0835  •  LORazepam (ATIVAN) tablet 1 mg, 1 mg, Oral, Q2H PRN, 1 mg at 05/16/22 0616 **OR** LORazepam (ATIVAN) injection 1 mg, 1 mg, Intravenous, Q2H PRN **OR** LORazepam (ATIVAN) tablet 2 mg, 2 mg, Oral, Q1H PRN, 2 mg at 05/15/22 1814 **OR** LORazepam (ATIVAN) injection 2 mg, 2 mg, Intravenous, Q1H PRN, 2 mg at 05/17/22 2030 **OR** LORazepam (ATIVAN) injection 2 mg, 2 mg, Intravenous, Q15 Min PRN **OR** LORazepam (ATIVAN) injection 2 mg, 2 mg, Intramuscular, Q15 Min PRN, Cristian Lopez MD  •  metoprolol tartrate (LOPRESSOR) tablet 25 mg, 25 mg, Oral, Q12H, Murali Aquino MD  •  nicotine (NICODERM CQ) 21 MG/24HR patch 1 patch, 1 patch, Transdermal, Q24H, Vic Mercedes Jr., MD, 1 patch at 05/19/22 0900  •  ondansetron (ZOFRAN) injection 4 mg, 4 mg, Intravenous, Q6H PRN, Vic Mercedes Jr., MD, 4 mg at 05/10/22 2045  •  potassium chloride (K-DUR,KLOR-CON) ER tablet 40 mEq, 40 mEq, Oral, PRN **OR** potassium chloride (KLOR-CON) packet 40 mEq, 40 mEq, Oral, PRN **OR** potassium chloride 10 mEq in 100 mL IVPB, 10 mEq, Intravenous, Q1H PRN, Vic Mercedes Jr., MD  •  [COMPLETED] Insert peripheral IV, , , Once **AND** sodium chloride 0.9 % flush 10 mL, 10 mL, Intravenous, PRN, Mago  Vic Pearce Jr., MD  •  sodium chloride 0.9 % flush 10 mL, 10 mL, Intravenous, Q12H, Vic Mercedes Jr., MD, 10 mL at 05/19/22 0835  •  sodium chloride 0.9 % flush 10 mL, 10 mL, Intravenous, PRN, Vic Mercedes Jr., MD  •  thiamine (VITAMIN B-1) tablet 100 mg, 100 mg, Oral, Daily, Cristian Lopez MD, 100 mg at 05/19/22 0835    Assessment and Plan:       Active Hospital Problems    Diagnosis  POA   • **Intractable back pain [M54.9]  Yes   • Elevated BP without diagnosis of hypertension [R03.0]  No   • Sinus tachycardia [R00.0]  No   • Hypokalemia [E87.6]  Yes   • Tobacco abuse [Z72.0]  Yes   • Alcohol withdrawal (HCC) [F10.239]  Yes   • Heart burn [R12]  Yes      Resolved Hospital Problems   No resolved problems to display.     1. Sinus tachycardia.  while at rest. Alcohol withdrawal appears to have resolved. No other obvious etiology for ST. Not hypoxic, anemic, febrile. TSH normal. BP on the low side so primary has ordered IVF. He is eating and drinking well. HE reports constant pain but this is being treated and he appears comfortable currently.    Lateral t wave inversions noted. Will check an echocardiogram. If normal, no other cardiac testing will be indicated.     Juju Merritt, APRN  05/19/22  13:57 EDT

## 2022-05-19 NOTE — PLAN OF CARE
Goal Outcome Evaluation:           Progress: improving   A-Ox4.  CIWA 0.  Pt tachycardic throughout shift.  1 liter bolus given per order.  PRN pain meds given for back pain.  Pt ambulated today with walker to the bathroom and walked out in the [place].  Will CTM.

## 2022-05-19 NOTE — PAYOR COMM NOTE
"Bogdan Pearson (46 y.o. Male)     PLEASE SEE ATTACHED FOR INPT CONTINUED STAY REVIEW.    REF#[id number]    PLEASE CALL   OR  [phone]    THANK YOU    BRIANNA SEWELL LPN CCP            Date of Birth   1975    Social Security Number       Address   [address] [place] [address]    Home Phone   [phone]    MRN   [id number]       [employer]beterian   None    Marital Status   Single                            Admission Date   5/9/22    Admission Type   Emergency    Admitting Provider   Cony Damian MD    Attending Provider   Murali Aquino MD    Department, Room/Bed   [address], [address]       Discharge Date       Discharge Disposition       Discharge Destination                               Attending Provider: Murali Aquino MD    Allergies: No Known Allergies    Isolation: None   Infection: None   Code Status: CPR   Advance Care Planning Activity    Ht: 180.3 cm (70.98\")   Wt: 78.4 kg (172 lb 12 oz)    Admission Cmt: None   Principal Problem: Intractable back pain [M54.9]                 Active Insurance as of 5/9/2022     Primary Coverage     Payor Plan Insurance Group Employer/Plan Group    [facility] [id number]     Payor Plan Address Payor Plan Phone Number Payor Plan Fax Number Effective Dates    [address]   1/1/2021 - None Entered    [place] [phone]       Subscriber Name Subscriber Birth Date Member ID       BOGDAN PEARSON 1975 [id number]                 Emergency Contacts      (Rel.) Home Phone Work Phone Mobile Phone    KRYSTAL WEBSTER (Father) [phone] -- [phone]            Oxygen Therapy (last day)     Date/Time SpO2 Device (Oxygen Therapy) Flow (L/min) Oxygen Concentration (%) ETCO2 (mmHg)    05/19/22 1344 95 room air -- -- --    05/19/22 0835 -- room air -- -- --    05/19/22 0734 98 room air -- -- --    05/18/22 2341 98 room air -- -- --    05/18/22 1942 93 room air -- -- --    " 22 1436 -- room air -- -- --    22 1303 94 room air -- -- --    22 0917 -- room air -- -- --    22 0730 97 room air -- -- --    22 0251 -- room air -- -- --          Intake & Output (last day)        0701   0700  07 07    P.O. 0     Total Intake(mL/kg) 0 (0)     Urine (mL/kg/hr) 625 (0.3)     Total Output 625     Net -625               Lines, Drains & Airways     Active LDAs     Name Placement date Placement time Site Days    Peripheral IV 05/15/22 1943 Right Forearm 05/15/22  1943  Forearm  3                         Physician Progress Notes (last 24 hours)      Murali Aquino MD at 22 1308              Name: Bogdan Jeter ADMIT: 2022   : 1975  PCP: Provider, No Known    MRN: [id number] LOS: 10 days   AGE/SEX: 46 y.o. male  ROOM: [facility]     Subjective   Subjective   No events overnight. Pt reports that his pain is well controlled and he has no complaints.    Objective   Objective   Vital Signs  Temp:  [98.5 °F (36.9 °C)-98.6 °F (37 °C)] 98.5 °F (36.9 °C)  Heart Rate:  [] 103  Resp:  [18] 18  BP: (115-135)/(87-97) 135/97  SpO2:  [93 %-98 %] 98 %  on   ;   Device (Oxygen Therapy): room air  Body mass index is 24.1 kg/m².  Physical Exam  Constitutional:       General: He is not in acute distress.     Appearance: He is not ill-appearing.   HENT:      Head:      Comments: Multiple subQ nodules on face, chronic  Cardiovascular:      Rate and Rhythm: Regular rhythm. Tachycardia present.      Heart sounds: Normal heart sounds.   Pulmonary:      Effort: Pulmonary effort is normal.      Breath sounds: Normal breath sounds.   Abdominal:      General: Bowel sounds are normal.      Palpations: Abdomen is soft.   Musculoskeletal:         General: No tenderness.      Right lower leg: No edema.      Left lower leg: No edema.   Neurological:      Mental Status: He is alert.   Psychiatric:         Mood and Affect: Mood normal.         Behavior: Behavior  normal.         Results Review     I reviewed the patient's new clinical results.  Results from last 7 days   Lab Units 05/19/22 0529 05/18/22 0544 05/17/22 0621 05/14/22 0343   WBC 10*3/mm3 8.60 6.80 6.37 8.54   HEMOGLOBIN g/dL 14.3 14.7 13.7 14.5   PLATELETS 10*3/mm3 386 303 287 205     Results from last 7 days   Lab Units 05/19/22  0529 05/18/22  1133 05/17/22  0621 05/15/22  0430   SODIUM mmol/L 129* 133* 133* 135*   POTASSIUM mmol/L 5.0 4.4 4.3 4.5   CHLORIDE mmol/L 95* 98 99 100   CO2 mmol/L 23.0 22.8 23.9 22.0   BUN mg/dL 9 9 10 8   CREATININE mg/dL 0.66* 0.59* 0.61* 0.59*   GLUCOSE mg/dL 98 125* 94 91   Estimated Creatinine Clearance: 153.4 mL/min (A) (by C-G formula based on SCr of 0.66 mg/dL (L)).  Results from last 7 days   Lab Units 05/18/22 1133 05/13/22  0414   ALBUMIN g/dL 3.70 3.00*   BILIRUBIN mg/dL 0.3 1.0   ALK PHOS U/L 101 103   AST (SGOT) U/L 35 28   ALT (SGPT) U/L 30 23     Results from last 7 days   Lab Units 05/19/22  0529 05/18/22  1133 05/18/22  0544 05/17/22  0621 05/15/22  0430 05/14/22  0343 05/13/22  0519 05/13/22  0414   CALCIUM mg/dL 9.8 10.0  --  9.6 9.5 9.4   < > 8.3*   ALBUMIN g/dL  --  3.70  --   --   --   --   --  3.00*   MAGNESIUM mg/dL 2.2  --  2.5 2.0 1.7  --   --   --    PHOSPHORUS mg/dL  --   --   --  4.0 5.0* 3.7  --  3.7    < > = values in this interval not displayed.     Results from last 7 days   Lab Units 05/14/22  0343   PROCALCITONIN ng/mL 0.16     COVID19   Date Value Ref Range Status   05/10/2022 Not Detected Not Detected - Ref. Range Final     Glucose   Date/Time Value Ref Range Status   05/16/2022 2355 96 70 - 130 mg/dL Final     Comment:     Meter: [id number] : [id number] Vahid POOLE       XR Chest 1 View  Narrative: SINGLE VIEW OF THE CHEST     HISTORY: Respiratory failure     COMPARISON: None available.     FINDINGS:  Heart size is within normal limits for technique. There appears to be  some vascular congestion. There is bibasilar atelectasis.  No  pneumothorax is seen. No definite effusion is identified.     Impression: Vascular congestion, as well as bibasilar atelectasis.     This report was finalized on 5/14/2022 4:49 AM by Dr. Tammy Mir M.D.       Scheduled Medications  enoxaparin, 40 mg, Subcutaneous, Nightly  folic acid, 1 mg, Oral, Daily  gabapentin, 100 mg, Oral, Q12H  lidocaine, 2 patch, Transdermal, Q24H  nicotine, 1 patch, Transdermal, Q24H  sodium chloride, 10 mL, Intravenous, Q12H  thiamine, 100 mg, Oral, Daily    Infusions   Diet  Diet Regular      Assessment/Plan     Active Hospital Problems    Diagnosis  POA   • **Intractable back pain [M54.9]  Yes   • Elevated BP without diagnosis of hypertension [R03.0]  No   • Sinus tachycardia [R00.0]  No   • Hypokalemia [E87.6]  Yes   • Tobacco abuse [Z72.0]  Yes   • Alcohol withdrawal (HCC) [F10.239]  Yes   • Heart burn [R12]  Yes      Resolved Hospital Problems   No resolved problems to display.       46 y.o. male admitted with Intractable back pain.    Mr. Jeter is a 46 year old male who presented to the hospital with complaints of severe back pain. Hospital course complicated by severe alcohol withdrawal requiring transfer to ICU.      · Severe alcohol withdrawal requiring high doses of Ativan as well as transfer to the ICU and Precedex. Off Precedex and transferred out of ICU. CIWA scores remain low off scheduled ativan. Continue PRN Ativan per CIWA protocol-has not required any yet. Continue vitamin replacement. Access following.  · Sinus Tachycardia: suspect due to withdrawal. Asymptomatic. Overall trend is improved, but he was going about 140 when I was in the room with him. Check another EKG. Start metoprolol. Ask cardiology to see.   · Elevated BP w/o dx of HTN: suspected to be related to withdrawal. Has been on clonidine taper. D/c this today.  · Intractable back pain: MRI now returned with some DDD but no acute fracture/compression/disc herniation. L4-5 with shortened pedicles  and mild central canal stenosis. Nothing surgical it seems. Orthopedic surgery was consulted--they agree with supportive care with pain meds, PT, and gabapentin. on po Lortab. PT evaluation noted. Very weak. SNF recommended at [place].   · Hyponatremia: mild at 129. Check urine sodium and osmolality and a serum uric acid tomorrow.    · Multiple chronic subq nodular lesions on face: he says this has been evaluated many years ago but it appears this was lost to follow up. This will need reevaluation in the outpatient setting.  · Nicotine patch  · Lovenox 40 mg SC daily for DVT prophylaxis.  · Full code.  · Discussed with patient and nursing staff.  · Anticipate discharge to SNU facility timing yet to be determined.      Murali Aquino MD  [facility]  05/19/22  13:08 EDT    I wore protective equipment throughout this patient encounter including a face mask, gloves and protective eyewear.  Hand hygiene was performed before donning protective equipment and after removal when leaving the room.        Electronically signed by Murali Aquino MD at 05/19/22 3864

## 2022-05-19 NOTE — NURSING NOTE
Access follow up   Per pt RN pt has been resting well overnight.   No acute changes   Last CIWA score was 0, no prn ativan given overnight. Oriented x4.   Pt reported to be going to short term rehab center at discharge for back pain and deficits.   Resources previously given by access for Substance use treatment. Access is available for any future needs.

## 2022-05-19 NOTE — PLAN OF CARE
Goal Outcome Evaluation:  Plan of Care Reviewed With: patient        Progress: improving  Patient shows no s/s of withdrawal. He is A0x4, able to make needs known. He verbalizes wanting to go to rehab so that he can work on his mobility. He is ready for discharge just awaiting acceptance into a facility. He is pleasant and cooperative with care.

## 2022-05-19 NOTE — THERAPY TREATMENT NOTE
Patient Name: Bogdan Jeter  : 1975    MRN: [id number]                              Today's Date: 2022       Admit Date: 2022    Visit Dx:     ICD-10-CM ICD-9-CM   1. Intractable back pain  M54.9 724.5   2. Immobility  Z74.09 799.89     Patient Active Problem List   Diagnosis   • Intractable back pain   • Hypokalemia   • Tobacco abuse   • Alcohol withdrawal (HCC)   • Heart burn   • Elevated BP without diagnosis of hypertension   • Sinus tachycardia     No past medical history on file.  No past surgical history on file.   General Information     Row Name 22 1146          Physical Therapy Time and Intention    Document Type therapy note (daily note)  -PH     Mode of Treatment physical therapy  -PH     Row Name 22 1146          General Information    Existing Precautions/Restrictions fall;seizures  -PH     Row Name 22 1146          Safety Issues, Functional Mobility    Impairments Affecting Function (Mobility) balance;endurance/activity tolerance;strength;range of motion (ROM);pain  -PH     Comment, Safety Issues/Impairments (Mobility) gt belt and non skid socks worn for safety  -PH           User Key  (r) = Recorded By, (t) = Taken By, (c) = Cosigned By    Initials Name Provider Type    PH Kailyn Cadena PTA Physical Therapist Assistant               Mobility     Row Name 22 1146          Bed Mobility    Bed Mobility supine-sit  -PH     Supine-Sit [place] (Bed Mobility) standby assist  -PH     Assistive Device (Bed Mobility) bed rails;head of bed elevated  -PH     Comment, (Bed Mobility) extra time to EOB  -PH     Row Name 22 1146          Sit-Stand Transfer    Sit-Stand [place] (Transfers) verbal cues;contact guard;1 person assist;2 person assist  -PH     Assistive Device (Sit-Stand Transfers) walker, front-wheeled  -PH     Comment, (Sit-Stand Transfer) no buckling noted  -PH     Row Name 22 1146          Gait/Stairs (Locomotion)    [place]  Level (Gait) contact guard;verbal cues;nonverbal cues (demo/gesture);minimum assist (75% patient effort);1 person assist;2 person assist  -PH     Assistive Device (Gait) walker, front-wheeled  -PH     Distance in Feet (Gait) 35'  -PH     Deviations/Abnormal Patterns (Gait) antalgic;rachel decreased;gait speed decreased;stride length decreased  -PH     Bilateral Gait Deviations forward flexed posture;heel strike decreased  -PH     [place] Level (Stairs) unable to assess  -PH     Comment, (Gait/Stairs) heavy use of B UE for support; antalgic step to pattern noted w/ pt very slow although no B knee buckling today.  -PH           User Key  (r) = Recorded By, (t) = Taken By, (c) = Cosigned By    Initials Name Provider Type     Kailyn Cadena PTA Physical Therapist Assistant               Obj/Interventions     Row Name 05/19/22 1148          Motor Skills    Therapeutic Exercise other (see comments)  BAP, LAQ, seated march, hip abd w/ resist / hip add w/ pillow - x 5 sec hold each rep; x 15 reps all  -PH     Row Name 05/19/22 1148          Balance    Balance Assessment sitting static balance;standing static balance  -PH     Static Sitting Balance standby assist  -PH     Static Standing Balance contact guard;verbal cues  -PH     Position/Device Used, Standing Balance walker, front-wheeled  -PH     Comment, Balance slow w/ no LOB during gait  -PH           User Key  (r) = Recorded By, (t) = Taken By, (c) = Cosigned By    Initials Name Provider Type     Kailyn Cadena PTA Physical Therapist Assistant               Goals/Plan    No documentation.                Clinical Impression     Row Name 05/19/22 1150          Pain    Pretreatment Pain Rating 9/10  -PH     Posttreatment Pain Rating 9/10  -PH     Pre/Posttreatment Pain Comment L LB and prox L LE  -PH     Pain Intervention(s) Repositioned;Ambulation/increased activity;Rest  -PH     Row Name 05/19/22 1150          Plan of Care Review    Plan of  Care Reviewed With patient  -PH     Progress improving  -PH     Outcome Evaluation Pt in bed at beg of session. Pt sat up to EOB req SBA. Pt stood req CGA and use of fww. Pt then amb 35' req CGA and use of fww. Pt very slow w/ antalgic step to pattern noted as well as heavy use of B UE for support. Pt performed BLE ther ex in chair for strengthening. RN notified that pt was in chair. PT will prog as pt vasquez.  -PH     Row Name 05/19/22 1150          Positioning and Restraints    Pre-Treatment Position in bed  -PH     Post Treatment Position chair  -PH     In Chair reclined;call light within reach;encouraged to call for assist;exit alarm on  -PH           User Key  (r) = Recorded By, (t) = Taken By, (c) = Cosigned By    Initials Name Provider Type     Kailyn Cadena PTA Physical Therapist Assistant               Outcome Measures     Row Name 05/19/22 1152          How much help from another person do you currently need...    Turning from your back to your side while in flat bed without using bedrails? 4  -PH     Moving from lying on back to sitting on the side of a flat bed without bedrails? 3  -PH     Moving to and from a bed to a chair (including a wheelchair)? 3  -PH     Standing up from a chair using your arms (e.g., wheelchair, bedside chair)? 3  -PH     Climbing 3-5 steps with a railing? 2  -PH     To walk in hospital room? 3  -PH     AM-PAC 6 Clicks Score (PT) 18  -PH     Highest level of mobility 6 --> Walked 10 steps or more  -PH     Row Name 05/19/22 1152          Functional Assessment    Outcome Measure Options AM-PAC 6 Clicks Basic Mobility (PT)  -PH           User Key  (r) = Recorded By, (t) = Taken By, (c) = Cosigned By    Initials Name Provider Type    Kailyn Hogue PTA Physical Therapist Assistant                             Physical Therapy Education                 Title: PT OT SLP Therapies (Done)     Topic: Physical Therapy (Done)     Point: Mobility training (Done)      Learning Progress Summary           Patient Acceptance, E,D, VU,NR by  at 5/19/2022 1152      Show all documentation for this point (5)                 Point: Home exercise program (Done)     Learning Progress Summary           Patient Acceptance, E,D, VU,NR by  at 5/19/2022 1152      Show all documentation for this point (4)                 Point: Body mechanics (Done)     Learning Progress Summary           Patient Acceptance, E,D, VU,NR by  at 5/19/2022 1152      Show all documentation for this point (5)                 Point: Precautions (Done)     Learning Progress Summary           Patient Acceptance, E,D, VU,NR by  at 5/19/2022 1152      Show all documentation for this point (5)                             User Key     Initials Effective Dates Name Provider Type [facility] 06/16/21 -  Kailyn Cadena PTA Physical Therapist Assistant PT              PT Recommendation and Plan     Plan of Care Reviewed With: patient  Progress: improving  Outcome Evaluation: Pt in bed at beg of session. Pt sat up to EOB req SBA. Pt stood req CGA and use of fww. Pt then amb 35' req CGA and use of fww. Pt very slow w/ antalgic step to pattern noted as well as heavy use of B UE for support. Pt performed BLE ther ex in chair for strengthening. RN notified that pt was in chair. PT will prog as pt vasquez.     Time Calculation:    PT Charges     Row Name 05/19/22 1153             Time Calculation    Start Time 0846  -PH      Stop Time 0909  -PH      Time Calculation (min) 23 min  -PH      PT Received On 05/19/22  -PH      PT - Next Appointment 05/20/22  -PH              Timed Charges    11330 - PT Therapeutic Exercise Minutes 10  -PH      00187 - PT Therapeutic Activity Minutes 13  -PH              Total Minutes    Timed Charges Total Minutes 23  -PH       Total Minutes 23  -PH            User Key  (r) = Recorded By, (t) = Taken By, (c) = Cosigned By    Initials Name Provider Type     Kailyn Cadena PTA  Physical Therapist Assistant              Therapy Charges for Today     Code Description Service Date Service Provider Modifiers Qty    97243937273 HC PT THER PROC EA 15 MIN 5/19/2022 BillcliffKailyn godfrey, PTA GP 1    19037228590 HC PT THERAPEUTIC ACT EA 15 MIN 5/19/2022 Kailyn Cadena, PTA GP 1    37412106310 HC PT THER SUPP EA 15 MIN 5/19/2022 ClarkgiuseppeepifanioKailyn, PTA GP 1          PT G-Codes  Outcome Measure Options: AM-PAC 6 Clicks Basic Mobility (PT)  AM-PAC 6 Clicks Score (PT): 18  AM-PAC 6 Clicks Score (OT): 14  Modified Caswell Scale: 4 - Moderately severe disability.  Unable to walk without assistance, and unable to attend to own bodily needs without assistance.    Kailyn Tammi, PTA  5/19/2022

## 2022-05-19 NOTE — PROGRESS NOTES
Name: Bogdan Jeter ADMIT: 2022   : 1975  PCP: Provider, No Known    MRN: [id number] LOS: 10 days   AGE/SEX: 46 y.o. male  ROOM: [facility]     Subjective   Subjective   No events overnight. Pt reports that his pain is well controlled and he has no complaints.     Objective   Objective   Vital Signs  Temp:  [98.5 °F (36.9 °C)-98.6 °F (37 °C)] 98.5 °F (36.9 °C)  Heart Rate:  [] 103  Resp:  [18] 18  BP: (115-135)/(87-97) 135/97  SpO2:  [93 %-98 %] 98 %  on   ;   Device (Oxygen Therapy): room air  Body mass index is 24.1 kg/m².  Physical Exam  Constitutional:       General: He is not in acute distress.     Appearance: He is not ill-appearing.   HENT:      Head:      Comments: Multiple subQ nodules on face, chronic  Cardiovascular:      Rate and Rhythm: Regular rhythm. Tachycardia present.      Heart sounds: Normal heart sounds.   Pulmonary:      Effort: Pulmonary effort is normal.      Breath sounds: Normal breath sounds.   Abdominal:      General: Bowel sounds are normal.      Palpations: Abdomen is soft.   Musculoskeletal:         General: No tenderness.      Right lower leg: No edema.      Left lower leg: No edema.   Neurological:      Mental Status: He is alert.   Psychiatric:         Mood and Affect: Mood normal.         Behavior: Behavior normal.         Results Review     I reviewed the patient's new clinical results.  Results from last 7 days   Lab Units 22  0529 22  0544 22  0621 22  0343   WBC 10*3/mm3 8.60 6.80 6.37 8.54   HEMOGLOBIN g/dL 14.3 14.7 13.7 14.5   PLATELETS 10*3/mm3 386 303 287 205     Results from last 7 days   Lab Units 22  0529 22  1133 22  0621 05/15/22  0430   SODIUM mmol/L 129* 133* 133* 135*   POTASSIUM mmol/L 5.0 4.4 4.3 4.5   CHLORIDE mmol/L 95* 98 99 100   CO2 mmol/L 23.0 22.8 23.9 22.0   BUN mg/dL 9 9 10 8   CREATININE mg/dL 0.66* 0.59* 0.61* 0.59*   GLUCOSE mg/dL 98 125* 94 91   Estimated Creatinine Clearance: 153.4 mL/min  (A) (by C-G formula based on SCr of 0.66 mg/dL (L)).  Results from last 7 days   Lab Units 05/18/22  1133 05/13/22  0414   ALBUMIN g/dL 3.70 3.00*   BILIRUBIN mg/dL 0.3 1.0   ALK PHOS U/L 101 103   AST (SGOT) U/L 35 28   ALT (SGPT) U/L 30 23     Results from last 7 days   Lab Units 05/19/22  0529 05/18/22  1133 05/18/22  0544 05/17/22  0621 05/15/22  0430 05/14/22  0343 05/13/22  0519 05/13/22  0414   CALCIUM mg/dL 9.8 10.0  --  9.6 9.5 9.4   < > 8.3*   ALBUMIN g/dL  --  3.70  --   --   --   --   --  3.00*   MAGNESIUM mg/dL 2.2  --  2.5 2.0 1.7  --   --   --    PHOSPHORUS mg/dL  --   --   --  4.0 5.0* 3.7  --  3.7    < > = values in this interval not displayed.     Results from last 7 days   Lab Units 05/14/22  0343   PROCALCITONIN ng/mL 0.16     COVID19   Date Value Ref Range Status   05/10/2022 Not Detected Not Detected - Ref. Range Final     Glucose   Date/Time Value Ref Range Status   05/16/2022 2355 96 70 - 130 mg/dL Final     Comment:     Meter: [id number] : [id number] Vahid POOLE       XR Chest 1 View  Narrative: SINGLE VIEW OF THE CHEST     HISTORY: Respiratory failure     COMPARISON: None available.     FINDINGS:  Heart size is within normal limits for technique. There appears to be  some vascular congestion. There is bibasilar atelectasis. No  pneumothorax is seen. No definite effusion is identified.     Impression: Vascular congestion, as well as bibasilar atelectasis.     This report was finalized on 5/14/2022 4:49 AM by Dr. Tammy Mir M.D.       Scheduled Medications  enoxaparin, 40 mg, Subcutaneous, Nightly  folic acid, 1 mg, Oral, Daily  gabapentin, 100 mg, Oral, Q12H  lidocaine, 2 patch, Transdermal, Q24H  nicotine, 1 patch, Transdermal, Q24H  sodium chloride, 10 mL, Intravenous, Q12H  thiamine, 100 mg, Oral, Daily    Infusions   Diet  Diet Regular       Assessment/Plan     Active Hospital Problems    Diagnosis  POA   • **Intractable back pain [M54.9]  Yes   • Elevated BP without  diagnosis of hypertension [R03.0]  No   • Sinus tachycardia [R00.0]  No   • Hypokalemia [E87.6]  Yes   • Tobacco abuse [Z72.0]  Yes   • Alcohol withdrawal (HCC) [F10.239]  Yes   • Heart burn [R12]  Yes      Resolved Hospital Problems   No resolved problems to display.       46 y.o. male admitted with Intractable back pain.    Mr. Jeter is a 46 year old male who presented to the hospital with complaints of severe back pain. Hospital course complicated by severe alcohol withdrawal requiring transfer to ICU.      · Severe alcohol withdrawal requiring high doses of Ativan as well as transfer to the ICU and Precedex. Off Precedex and transferred out of ICU. CIWA scores remain low off scheduled ativan. Continue PRN Ativan per CIWA protocol-has not required any yet. Continue vitamin replacement. Access following.  · Sinus Tachycardia: suspect due to withdrawal. Asymptomatic. Overall trend is improved, but he was going about 140 when I was in the room with him. Check another EKG. Start metoprolol. Ask cardiology to see.   · Elevated BP w/o dx of HTN: suspected to be related to withdrawal. Has been on clonidine taper. D/c this today.  · Intractable back pain: MRI now returned with some DDD but no acute fracture/compression/disc herniation. L4-5 with shortened pedicles and mild central canal stenosis. Nothing surgical it seems. Orthopedic surgery was consulted--they agree with supportive care with pain meds, PT, and gabapentin. on po Lortab. PT evaluation noted. Very weak. SNF recommended at NH.   · Hyponatremia: mild at 129. Check urine sodium and osmolality and a serum uric acid tomorrow.    · Multiple chronic subq nodular lesions on face: he says this has been evaluated many years ago but it appears this was lost to follow up. This will need reevaluation in the outpatient setting.  · Nicotine patch  · Lovenox 40 mg SC daily for DVT prophylaxis.  · Full code.  · Discussed with patient and nursing staff.  · Anticipate  discharge to SNU facility timing yet to be determined.      Murali Aquino MD  [facility]  05/19/22  13:08 EDT    I wore protective equipment throughout this patient encounter including a face mask, gloves and protective eyewear.  Hand hygiene was performed before donning protective equipment and after removal when leaving the room.

## 2022-05-20 ENCOUNTER — APPOINTMENT (OUTPATIENT)
Dept: CARDIOLOGY | Facility: HOSPITAL | Age: 47
End: 2022-05-20

## 2022-05-20 VITALS
RESPIRATION RATE: 18 BRPM | HEIGHT: 71 IN | HEART RATE: 107 BPM | OXYGEN SATURATION: 93 % | WEIGHT: 172 LBS | DIASTOLIC BLOOD PRESSURE: 86 MMHG | SYSTOLIC BLOOD PRESSURE: 126 MMHG | BODY MASS INDEX: 24.08 KG/M2 | TEMPERATURE: 98.3 F

## 2022-05-20 PROBLEM — F10.939 ALCOHOL WITHDRAWAL: Status: RESOLVED | Noted: 2022-05-09 | Resolved: 2022-05-20

## 2022-05-20 PROBLEM — M54.9 INTRACTABLE BACK PAIN: Status: RESOLVED | Noted: 2022-05-09 | Resolved: 2022-05-20

## 2022-05-20 PROBLEM — E87.6 HYPOKALEMIA: Status: RESOLVED | Noted: 2022-05-09 | Resolved: 2022-05-20

## 2022-05-20 PROBLEM — R03.0 ELEVATED BP WITHOUT DIAGNOSIS OF HYPERTENSION: Status: RESOLVED | Noted: 2022-05-17 | Resolved: 2022-05-20

## 2022-05-20 PROBLEM — I50.22 CHRONIC SYSTOLIC HEART FAILURE: Status: ACTIVE | Noted: 2022-05-20

## 2022-05-20 PROBLEM — R12 HEART BURN: Status: RESOLVED | Noted: 2022-05-09 | Resolved: 2022-05-20

## 2022-05-20 LAB
ANION GAP SERPL CALCULATED.3IONS-SCNC: 9 MMOL/L (ref 5–15)
AORTIC ARCH: 2.5 CM
AORTIC DIMENSIONLESS INDEX: 0.7 (DI)
BH CV ECHO MEAS - ACS: 2.6 CM
BH CV ECHO MEAS - AO MAX PG: 3.4 MMHG
BH CV ECHO MEAS - AO MEAN PG: 1.86 MMHG
BH CV ECHO MEAS - AO ROOT DIAM: 3.9 CM
BH CV ECHO MEAS - AO V2 MAX: 92.1 CM/SEC
BH CV ECHO MEAS - AO V2 VTI: 13.9 CM
BH CV ECHO MEAS - AVA(I,D): 3.2 CM2
BH CV ECHO MEAS - EDV(CUBED): 225.1 ML
BH CV ECHO MEAS - EDV(MOD-SP2): 170 ML
BH CV ECHO MEAS - EDV(MOD-SP4): 138 ML
BH CV ECHO MEAS - EF(MOD-BP): 18.3 %
BH CV ECHO MEAS - EF(MOD-SP2): 20.6 %
BH CV ECHO MEAS - EF(MOD-SP4): 18.8 %
BH CV ECHO MEAS - ESV(CUBED): 127.1 ML
BH CV ECHO MEAS - ESV(MOD-SP2): 135 ML
BH CV ECHO MEAS - ESV(MOD-SP4): 112 ML
BH CV ECHO MEAS - FS: 17.3 %
BH CV ECHO MEAS - IVS/LVPW: 1 CM
BH CV ECHO MEAS - IVSD: 0.94 CM
BH CV ECHO MEAS - LAT PEAK E' VEL: 4.9 CM/SEC
BH CV ECHO MEAS - LV DIASTOLIC VOL/BSA (35-75): 70.5 CM2
BH CV ECHO MEAS - LV MASS(C)D: 231.8 GRAMS
BH CV ECHO MEAS - LV MAX PG: 1.57 MMHG
BH CV ECHO MEAS - LV MEAN PG: 0.81 MMHG
BH CV ECHO MEAS - LV SYSTOLIC VOL/BSA (12-30): 57.2 CM2
BH CV ECHO MEAS - LV V1 MAX: 62.6 CM/SEC
BH CV ECHO MEAS - LV V1 VTI: 9.5 CM
BH CV ECHO MEAS - LVIDD: 6.1 CM
BH CV ECHO MEAS - LVIDS: 5 CM
BH CV ECHO MEAS - LVOT AREA: 4.7 CM2
BH CV ECHO MEAS - LVOT DIAM: 2.45 CM
BH CV ECHO MEAS - LVPWD: 0.93 CM
BH CV ECHO MEAS - MED PEAK E' VEL: 4.3 CM/SEC
BH CV ECHO MEAS - MV A DUR: 0.1 SEC
BH CV ECHO MEAS - MV A MAX VEL: 74.1 CM/SEC
BH CV ECHO MEAS - MV DEC SLOPE: 240 CM/SEC2
BH CV ECHO MEAS - MV DEC TIME: 239 MSEC
BH CV ECHO MEAS - MV E MAX VEL: 58.7 CM/SEC
BH CV ECHO MEAS - MV E/A: 0.79
BH CV ECHO MEAS - MV MAX PG: 2.08 MMHG
BH CV ECHO MEAS - MV MEAN PG: 1.05 MMHG
BH CV ECHO MEAS - MV P1/2T: 76.3 MSEC
BH CV ECHO MEAS - MV V2 VTI: 13.8 CM
BH CV ECHO MEAS - MVA(P1/2T): 2.9 CM2
BH CV ECHO MEAS - MVA(VTI): 3.2 CM2
BH CV ECHO MEAS - PA ACC TIME: 0.1 SEC
BH CV ECHO MEAS - PA PR(ACCEL): 33.8 MMHG
BH CV ECHO MEAS - PA V2 MAX: 80.8 CM/SEC
BH CV ECHO MEAS - PI END-D VEL: 91.2 CM/SEC
BH CV ECHO MEAS - PULM A REVS DUR: 0.12 SEC
BH CV ECHO MEAS - PULM A REVS VEL: 26.2 CM/SEC
BH CV ECHO MEAS - PULM DIAS VEL: 41.7 CM/SEC
BH CV ECHO MEAS - PULM S/D: 1
BH CV ECHO MEAS - PULM SYS VEL: 41.7 CM/SEC
BH CV ECHO MEAS - QP/QS: 1.96
BH CV ECHO MEAS - RV MAX PG: 2.35 MMHG
BH CV ECHO MEAS - RV V1 MAX: 76.7 CM/SEC
BH CV ECHO MEAS - RV V1 VTI: 13.5 CM
BH CV ECHO MEAS - RVOT DIAM: 2.9 CM
BH CV ECHO MEAS - SI(MOD-SP2): 17.9 ML/M2
BH CV ECHO MEAS - SI(MOD-SP4): 13.3 ML/M2
BH CV ECHO MEAS - SV(LVOT): 44.8 ML
BH CV ECHO MEAS - SV(MOD-SP2): 35 ML
BH CV ECHO MEAS - SV(MOD-SP4): 26 ML
BH CV ECHO MEAS - SV(RVOT): 87.8 ML
BH CV ECHO MEAS - TAPSE (>1.6): 1.9 CM
BH CV ECHO MEASUREMENTS AVERAGE E/E' RATIO: 12.76
BH CV XLRA - RV BASE: 2.7 CM
BH CV XLRA - RV LENGTH: 7.1 CM
BH CV XLRA - RV MID: 2.04 CM
BH CV XLRA - TDI S': 11.8 CM/SEC
BUN SERPL-MCNC: 12 MG/DL (ref 6–20)
BUN/CREAT SERPL: 16 (ref 7–25)
CALCIUM SPEC-SCNC: 9.5 MG/DL (ref 8.6–10.5)
CHLORIDE SERPL-SCNC: 97 MMOL/L (ref 98–107)
CO2 SERPL-SCNC: 26 MMOL/L (ref 22–29)
CREAT SERPL-MCNC: 0.75 MG/DL (ref 0.76–1.27)
EGFRCR SERPLBLD CKD-EPI 2021: 112 ML/MIN/1.73
GLUCOSE SERPL-MCNC: 95 MG/DL (ref 65–99)
LEFT ATRIUM VOLUME INDEX: 7.6 ML/M2
LV EF 2D ECHO EST: 18 %
MAXIMAL PREDICTED HEART RATE: 173 BPM
POTASSIUM SERPL-SCNC: 4.9 MMOL/L (ref 3.5–5.2)
QT INTERVAL: 313 MS
SODIUM SERPL-SCNC: 132 MMOL/L (ref 136–145)
STRESS TARGET HR: 147 BPM
URATE SERPL-MCNC: 4 MG/DL (ref 3.4–7)

## 2022-05-20 PROCEDURE — 80048 BASIC METABOLIC PNL TOTAL CA: CPT | Performed by: STUDENT IN AN ORGANIZED HEALTH CARE EDUCATION/TRAINING PROGRAM

## 2022-05-20 PROCEDURE — 93306 TTE W/DOPPLER COMPLETE: CPT

## 2022-05-20 PROCEDURE — 25010000002 ENOXAPARIN PER 10 MG: Performed by: INTERNAL MEDICINE

## 2022-05-20 PROCEDURE — 84550 ASSAY OF BLOOD/URIC ACID: CPT | Performed by: STUDENT IN AN ORGANIZED HEALTH CARE EDUCATION/TRAINING PROGRAM

## 2022-05-20 PROCEDURE — 25010000002 PERFLUTREN (DEFINITY) 8.476 MG IN SODIUM CHLORIDE (PF) 0.9 % 10 ML INJECTION: Performed by: INTERNAL MEDICINE

## 2022-05-20 PROCEDURE — 97530 THERAPEUTIC ACTIVITIES: CPT

## 2022-05-20 PROCEDURE — 99232 SBSQ HOSP IP/OBS MODERATE 35: CPT | Performed by: INTERNAL MEDICINE

## 2022-05-20 PROCEDURE — 93306 TTE W/DOPPLER COMPLETE: CPT | Performed by: INTERNAL MEDICINE

## 2022-05-20 RX ORDER — METOPROLOL SUCCINATE 25 MG/1
25 TABLET, EXTENDED RELEASE ORAL
Start: 2022-05-20 | End: 2022-07-20 | Stop reason: SDUPTHER

## 2022-05-20 RX ORDER — LIDOCAINE 50 MG/G
2 PATCH TOPICAL
Status: ON HOLD
Start: 2022-05-21 | End: 2023-01-05

## 2022-05-20 RX ORDER — RAMIPRIL 1.25 MG/1
1.25 CAPSULE ORAL
Status: DISCONTINUED | OUTPATIENT
Start: 2022-05-20 | End: 2022-05-21 | Stop reason: HOSPADM

## 2022-05-20 RX ORDER — FOLIC ACID 1 MG/1
1 TABLET ORAL DAILY
Status: ON HOLD
Start: 2022-05-21 | End: 2022-11-12 | Stop reason: SDUPTHER

## 2022-05-20 RX ORDER — NICOTINE 21 MG/24HR
1 PATCH, TRANSDERMAL 24 HOURS TRANSDERMAL
Status: ON HOLD
Start: 2022-05-21 | End: 2022-11-12 | Stop reason: SDUPTHER

## 2022-05-20 RX ORDER — GABAPENTIN 100 MG/1
100 CAPSULE ORAL EVERY 12 HOURS SCHEDULED
Qty: 6 CAPSULE | Refills: 0 | Status: ON HOLD | OUTPATIENT
Start: 2022-05-20 | End: 2022-11-12

## 2022-05-20 RX ORDER — METOPROLOL SUCCINATE 25 MG/1
25 TABLET, EXTENDED RELEASE ORAL
Status: DISCONTINUED | OUTPATIENT
Start: 2022-05-20 | End: 2022-05-21 | Stop reason: HOSPADM

## 2022-05-20 RX ORDER — HYDROCODONE BITARTRATE AND ACETAMINOPHEN 10; 325 MG/1; MG/1
1 TABLET ORAL EVERY 6 HOURS PRN
Qty: 12 TABLET | Refills: 0 | Status: SHIPPED | OUTPATIENT
Start: 2022-05-20 | End: 2022-05-23

## 2022-05-20 RX ORDER — RAMIPRIL 1.25 MG/1
1.25 CAPSULE ORAL
Start: 2022-05-20 | End: 2022-08-24 | Stop reason: SDUPTHER

## 2022-05-20 RX ADMIN — HYDROCODONE BITARTRATE AND ACETAMINOPHEN 1 TABLET: 10; 325 TABLET ORAL at 06:35

## 2022-05-20 RX ADMIN — GABAPENTIN 100 MG: 100 CAPSULE ORAL at 09:00

## 2022-05-20 RX ADMIN — Medication 10 ML: at 20:47

## 2022-05-20 RX ADMIN — ENOXAPARIN SODIUM 40 MG: 100 INJECTION SUBCUTANEOUS at 20:46

## 2022-05-20 RX ADMIN — NICOTINE 1 PATCH: 21 PATCH, EXTENDED RELEASE TRANSDERMAL at 09:01

## 2022-05-20 RX ADMIN — METOPROLOL SUCCINATE 25 MG: 25 TABLET, EXTENDED RELEASE ORAL at 18:28

## 2022-05-20 RX ADMIN — Medication 100 MG: at 09:00

## 2022-05-20 RX ADMIN — Medication 10 ML: at 09:01

## 2022-05-20 RX ADMIN — LIDOCAINE 2 PATCH: 50 PATCH TOPICAL at 09:01

## 2022-05-20 RX ADMIN — HYDROCODONE BITARTRATE AND ACETAMINOPHEN 1 TABLET: 10; 325 TABLET ORAL at 16:38

## 2022-05-20 RX ADMIN — METOPROLOL TARTRATE 25 MG: 25 TABLET, FILM COATED ORAL at 09:00

## 2022-05-20 RX ADMIN — PERFLUTREN 2 ML: 6.52 INJECTION, SUSPENSION INTRAVENOUS at 15:08

## 2022-05-20 RX ADMIN — RAMIPRIL 1.25 MG: 1.25 CAPSULE ORAL at 18:28

## 2022-05-20 RX ADMIN — HYDROCODONE BITARTRATE AND ACETAMINOPHEN 1 TABLET: 10; 325 TABLET ORAL at 11:57

## 2022-05-20 RX ADMIN — GABAPENTIN 100 MG: 100 CAPSULE ORAL at 20:47

## 2022-05-20 RX ADMIN — HYDROCODONE BITARTRATE AND ACETAMINOPHEN 1 TABLET: 10; 325 TABLET ORAL at 20:46

## 2022-05-20 RX ADMIN — FOLIC ACID 1 MG: 1 TABLET ORAL at 09:00

## 2022-05-20 RX ADMIN — HYDROCODONE BITARTRATE AND ACETAMINOPHEN 1 TABLET: 10; 325 TABLET ORAL at 02:34

## 2022-05-20 NOTE — PLAN OF CARE
Goal Outcome Evaluation:  Plan of Care Reviewed With: patient        Progress: improving  Outcome Evaluation: Pt making improvements w/ PT today. Pt in bed and sat up to EOB w/ mod I and dressed himself at EOB w/ mod I. Pt stood req SBA and use of fww then amb 210' around nsg station w/ a very slow, antalgic gait. Pt req SBA and use of fww for amb w/ pt steady and no overt safety issues noted. PT returned to room PM to perform steps w/ pt educ for and asc/dsc 18 steps w/ 1 HR and CGA. No overt safety issues noted during performance of stairs. PT will sign off at this time w/ acute goals met.    Patient was intermittently wearing a face mask during this therapy encounter. Therapist used appropriate personal protective equipment including eye protection, mask, and gloves.  Mask used was standard procedure mask. Appropriate PPE was worn during the entire therapy session. Hand hygiene was completed before and after therapy session. Patient is not in enhanced droplet precautions.

## 2022-05-20 NOTE — PLAN OF CARE
Goal Outcome Evaluation:  Plan of Care Reviewed With: patient        Progress: improving   Patient ambulating to the bathroom with standby assist. Able to make needs known. Continues to c/o back pain. Repositioned for comfort. Verbalizes wanting to go home prior to going to rehab.    Pt on RA, no acute changes during shift. OBS status. Problem: Patient Centered Care  Goal: Patient preferences are identified and integrated in the patient's plan of care  Description: Interventions:  - What would you like us to know as we care for you?  Marta Brewer optimal ventilation and oxygenation  Description: INTERVENTIONS:  - Assess for changes in respiratory status  - Assess for changes in mentation and behavior  - Position to facilitate oxygenation and minimize respiratory effort  - Oxygen supplementation bas

## 2022-05-20 NOTE — THERAPY TREATMENT NOTE
Patient Name: Bogdan Jeter  : 1975    MRN: [id number]                              Today's Date: 2022       Admit Date: 2022    Visit Dx:     ICD-10-CM ICD-9-CM   1. Intractable back pain  M54.9 724.5   2. Immobility  Z74.09 799.89   3. Subcutaneous cysts, generalized  L72.0 706.2     Patient Active Problem List   Diagnosis   • Tobacco abuse   • Sinus tachycardia   • Chronic systolic heart failure (CMS/HCC)     No past medical history on file.  No past surgical history on file.   General Information     Row Name 22 1132          Physical Therapy Time and Intention    Document Type therapy note (daily note)  -     Mode of Treatment physical therapy  -     Row Name 22 1132          General Information    Existing Precautions/Restrictions fall;seizures  -     Row Name 22 1132          Safety Issues, Functional Mobility    Impairments Affecting Function (Mobility) pain;endurance/activity tolerance;range of motion (ROM)  -     Comment, Safety Issues/Impairments (Mobility) gt belt and non skid socks worn for pt safety  -           User Key  (r) = Recorded By, (t) = Taken By, (c) = Cosigned By    Initials Name Provider Type    PH Kailyn Cadena PTA Physical Therapist Assistant               Mobility     Row Name 22 1132          Bed Mobility    Bed Mobility supine-sit;sit-supine  -PH     Supine-Sit [place] (Bed Mobility) modified independence  -PH     Sit-Supine [place] (Bed Mobility) modified independence  -PH     Assistive Device (Bed Mobility) head of bed elevated  -PH     Comment, (Bed Mobility) extra time w/ pt using urinal and donning clothing at EOB; pt returned to bed after performing stairs late PM  -     Row Name 22 1132          Sit-Stand Transfer    Sit-Stand [place] (Transfers) standby assist  -     Assistive Device (Sit-Stand Transfers) walker, front-wheeled  -     Comment, (Sit-Stand Transfer) 3x; once AM/ 2x PM; steady  w/ no LOB  -PH     Row Name 05/20/22 1132          Gait/Stairs (Locomotion)    [place] Level (Gait) standby assist  -PH     Assistive Device (Gait) walker, front-wheeled  -PH     Distance in Feet (Gait) 210'  -PH     Deviations/Abnormal Patterns (Gait) antalgic;rachel decreased;gait speed decreased;stride length decreased  -PH     [place] Level (Stairs) verbal cues;nonverbal cues (demo/gesture);contact guard  -PH     Handrail Location (Stairs) left side (ascending);right side (descending)  -PH     Number of Steps (Stairs) 18  -PH     Ascending Technique (Stairs) step-to-step  -PH     Descending Technique (Stairs) step-to-step  -PH     Comment, (Gait/Stairs) very slow, antalgic gait although steady w/ no LOB or overt safety issues noted; pt educ for and asc/dsc 18 steps w/ 1 HR; slow w/ no overt safety issues noted during performance of stairs  -PH           User Key  (r) = Recorded By, (t) = Taken By, (c) = Cosigned By    Initials Name Provider Type     Kailyn Cadena PTA Physical Therapist Assistant               Obj/Interventions     Row Name 05/20/22 1134          Balance    Balance Assessment sitting static balance;standing static balance;sitting dynamic balance  -PH     Static Sitting Balance modified independence  -PH     Dynamic Sitting Balance modified independence  -PH     Static Standing Balance modified independence  -PH     Comment, Balance no unsteadiness noted throughout session  -PH           User Key  (r) = Recorded By, (t) = Taken By, (c) = Cosigned By    Initials Name Provider Type     Kailyn Cadena PTA Physical Therapist Assistant               Goals/Plan    No documentation.                Clinical Impression     Row Name 05/20/22 1135          Pain    Pretreatment Pain Rating 8/10  -PH     Posttreatment Pain Rating 8/10  -PH     Pain Location - Side/Orientation Left  -PH     Pain Location lower  -PH     Pain Location - extremity  -PH     Pain Intervention(s)  Repositioned;Rest  -PH     Row Name 05/20/22 1135          Plan of Care Review    Plan of Care Reviewed With patient  -PH     Progress improving  -PH     Outcome Evaluation Pt making improvements w/ PT today. Pt in bed and sat up to EOB w/ mod I and dressed himself at EOB w/ mod I. Pt stood req SBA and use of fww then amb 210' around nsg station w/ a very slow, antalgic gait. Pt req SBA and use of fww for amb w/ pt steady and no overt safety issues noted. PT returned to room PM to perform steps w/ pt educ for and asc/dsc 18 steps w/ 1 HR and CGA. No overt safety issues noted during performance of stairs. PT will sign off at this time w/ acute goals met.  -PH     Row Name 05/20/22 1135          Positioning and Restraints    Pre-Treatment Position in bed  -PH     Post Treatment Position chair  -PH     In Bed fowlers;notified nsg;call light within reach;encouraged to call for assist;exit alarm on  -PH           User Key  (r) = Recorded By, (t) = Taken By, (c) = Cosigned By    Initials Name Provider Type    PH Kailyn Cadena PTA Physical Therapist Assistant               Outcome Measures     Row Name 05/20/22 1140          How much help from another person do you currently need...    Turning from your back to your side while in flat bed without using bedrails? 4  -PH     Moving from lying on back to sitting on the side of a flat bed without bedrails? 4  -PH     Moving to and from a bed to a chair (including a wheelchair)? 4  -PH     Standing up from a chair using your arms (e.g., wheelchair, bedside chair)? 4  -PH     Climbing 3-5 steps with a railing? 3  -PH     To walk in hospital room? 3  -PH     AM-PAC 6 Clicks Score (PT) 22  -PH     Highest level of mobility 7 --> Walked 25 feet or more  -PH     Row Name 05/20/22 1140          Functional Assessment    Outcome Measure Options AM-PAC 6 Clicks Basic Mobility (PT)  -PH           User Key  (r) = Recorded By, (t) = Taken By, (c) = Cosigned By    Initials Name  Provider Type     Kailyn Cadena PTA Physical Therapist Assistant                             Physical Therapy Education                 Title: PT OT SLP Therapies (Done)     Topic: Physical Therapy (Done)     Point: Mobility training (Done)     Learning Progress Summary           Patient Acceptance, E,D, VU,DU by  at 5/20/2022 1140      Show all documentation for this point (6)                 Point: Home exercise program (Done)     Learning Progress Summary           Patient Acceptance, E,D, VU,DU by  at 5/20/2022 1140      Show all documentation for this point (5)                 Point: Body mechanics (Done)     Learning Progress Summary           Patient Acceptance, E,D, VU,DU by  at 5/20/2022 1140      Show all documentation for this point (6)                 Point: Precautions (Done)     Learning Progress Summary           Patient Acceptance, E,D, VU,DU by  at 5/20/2022 1140      Show all documentation for this point (6)                             User Key     Initials Effective Dates Name Provider Type Discipline     06/16/21 -  Kailyn Cadena PTA Physical Therapist Assistant PT              PT Recommendation and Plan     Plan of Care Reviewed With: patient  Progress: improving  Outcome Evaluation: Pt making improvements w/ PT today. Pt in bed and sat up to EOB w/ mod I and dressed himself at EOB w/ mod I. Pt stood req SBA and use of fww then amb 210' around nsg station w/ a very slow, antalgic gait. Pt req SBA and use of fww for amb w/ pt steady and no overt safety issues noted. PT returned to room PM to perform steps w/ pt educ for and asc/dsc 18 steps w/ 1 HR and CGA. No overt safety issues noted during performance of stairs. PT will sign off at this time w/ acute goals met.     Time Calculation:    PT Charges     Row Name 05/20/22 1538 05/20/22 1143          Time Calculation    Start Time 1521  -PH 0819  -PH     Stop Time 1533  -PH 0848  -     Time Calculation (min) 12 min   -PH 29 min  -PH     PT Received On -- 05/20/22  -PH     PT - Next Appointment -- 05/21/22  -PH            Timed Charges    43637 - PT Therapeutic Activity Minutes 12  -PH 29  -PH            Total Minutes    Timed Charges Total Minutes 12  -PH 29  -PH      Total Minutes 12  -PH 29  -PH           User Key  (r) = Recorded By, (t) = Taken By, (c) = Cosigned By    Initials Name Provider Type    PH Kailyn Cadena, PTA Physical Therapist Assistant              Therapy Charges for Today     Code Description Service Date Service Provider Modifiers Qty    52988198717 HC PT THER PROC EA 15 MIN 5/19/2022 Lizet Cadenaricia, PTA GP 1    70826376275 HC PT THERAPEUTIC ACT EA 15 MIN 5/19/2022 Kailyn Cadena, PTA GP 1    46848219734 HC PT THER SUPP EA 15 MIN 5/19/2022 Lizet Cadenaricia, PTA GP 1    96606848677 HC PT THERAPEUTIC ACT EA 15 MIN 5/20/2022 Kailyn Cadena, PTA GP 2    13457064257 HC PT THERAPEUTIC ACT EA 15 MIN 5/20/2022 Lizet Cadenaricia, PTA GP 1          PT G-Codes  Outcome Measure Options: AM-PAC 6 Clicks Basic Mobility (PT)  AM-PAC 6 Clicks Score (PT): 22  AM-PAC 6 Clicks Score (OT): 14  Modified Keke Scale: 4 - Moderately severe disability.  Unable to walk without assistance, and unable to attend to own bodily needs without assistance.    Kailyn Cadena PTA  5/20/2022

## 2022-05-20 NOTE — CASE MANAGEMENT/SOCIAL WORK
Continued Stay Note  [facility]     Patient Name: Bgodan Jeter  MRN: [id number]  Today's Date: 5/20/2022    Admit Date: 5/9/2022     Discharge Plan     Row Name 05/20/22 1011       Plan    Plan Referral pending for [facility]    Patient/Family in Agreement with Plan yes    Plan Comments CCP called [facility] and explained pt ready for dc today, can they accept? She is checking bed availability and will call CCP back. Kelsey corrales/ccp               Discharge Codes    No documentation.               Expected Discharge Date and Time     Expected Discharge Date Expected Discharge Time    May 19, 2022             Dian Rodriguez, RN

## 2022-05-20 NOTE — THERAPY TREATMENT NOTE
Patient Name: Bogdan Jeter  : 1975    MRN: [id number]                              Today's Date: 2022       Admit Date: 2022    Visit Dx:     ICD-10-CM ICD-9-CM   1. Intractable back pain  M54.9 724.5   2. Immobility  Z74.09 799.89     Patient Active Problem List   Diagnosis   • Intractable back pain   • Hypokalemia   • Tobacco abuse   • Alcohol withdrawal (HCC)   • Heart burn   • Elevated BP without diagnosis of hypertension   • Sinus tachycardia     No past medical history on file.  No past surgical history on file.   General Information     Row Name 22 1132          Physical Therapy Time and Intention    Document Type therapy note (daily note)  -     Mode of Treatment physical therapy  -     Row Name 22 1132          General Information    Existing Precautions/Restrictions fall;seizures  -PH     Row Name 22 1132          Safety Issues, Functional Mobility    Impairments Affecting Function (Mobility) pain;endurance/activity tolerance;range of motion (ROM)  -PH     Comment, Safety Issues/Impairments (Mobility) gt belt and non skid socks worn for pt safety  -PH           User Key  (r) = Recorded By, (t) = Taken By, (c) = Cosigned By    Initials Name Provider Type    PH Kailyn Cadena PTA Physical Therapist Assistant               Mobility     Row Name 22 1132          Bed Mobility    Bed Mobility supine-sit  -PH     Sit-Supine [place] (Bed Mobility) modified independence  -PH     Assistive Device (Bed Mobility) head of bed elevated  -PH     Comment, (Bed Mobility) extra time w/ pt using urinal and donning clothing at EOB  -     Row Name 22 1132          Sit-Stand Transfer    Sit-Stand [place] (Transfers) standby assist  -PH     Assistive Device (Sit-Stand Transfers) walker, front-wheeled  -PH     Row Name 22 1132          Gait/Stairs (Locomotion)    [place] Level (Gait) standby assist  -PH     Assistive Device (Gait) walker,  front-wheeled  -PH     Distance in Feet (Gait) 210'  -PH     Deviations/Abnormal Patterns (Gait) antalgic;rachel decreased;gait speed decreased;stride length decreased  -PH     [place] Level (Stairs) unable to assess  -PH     Comment, (Gait/Stairs) very slow, antalgic gait although steady w/ no LOB or overt safety issues noted  -PH           User Key  (r) = Recorded By, (t) = Taken By, (c) = Cosigned By    Initials Name Provider Type     Kailyn Cadena PTA Physical Therapist Assistant               Obj/Interventions     Row Name 05/20/22 1134          Balance    Balance Assessment sitting static balance;standing static balance;sitting dynamic balance  -PH     Static Sitting Balance modified independence  -PH     Dynamic Sitting Balance modified independence  -PH     Static Standing Balance modified independence  -PH     Comment, Balance no unsteadiness noted throughout session  -PH           User Key  (r) = Recorded By, (t) = Taken By, (c) = Cosigned By    Initials Name Provider Type     Kailyn Cadena PTA Physical Therapist Assistant               Goals/Plan    No documentation.                Clinical Impression     Row Name 05/20/22 1135          Pain    Pretreatment Pain Rating 8/10  -PH     Posttreatment Pain Rating 8/10  -PH     Pain Location - Side/Orientation Left  -PH     Pain Location lower  -PH     Pain Location - extremity  -PH     Pain Intervention(s) Repositioned;Rest  -PH     Row Name 05/20/22 1135          Plan of Care Review    Plan of Care Reviewed With patient  -PH     Progress improving  -PH     Outcome Evaluation Pt making improvements w/ PT today. Pt in bed and sat up to EOB w/ mod I and dressed himself at EOB w/ mod I. Pt stood req SBA and use of fww then amb 210' around nsg station w/ a very slow, antalgic gait. Pt req SBA and use of fww for amb w/ pt steady and no overt safety issues noted. Pt in chair at end of session w/ chair alarm set and RN notified. PT will  sign off at this time w/ acute goals met.  -PH     Row Name 05/20/22 1135          Positioning and Restraints    Pre-Treatment Position in bed  -PH     Post Treatment Position chair  -PH     In Bed fowlers;notified nsg;call light within reach;encouraged to call for assist;exit alarm on  -PH           User Key  (r) = Recorded By, (t) = Taken By, (c) = Cosigned By    Initials Name Provider Type     Kailyn Cadena PTA Physical Therapist Assistant               Outcome Measures     Row Name 05/20/22 1140          How much help from another person do you currently need...    Turning from your back to your side while in flat bed without using bedrails? 4  -PH     Moving from lying on back to sitting on the side of a flat bed without bedrails? 4  -PH     Moving to and from a bed to a chair (including a wheelchair)? 4  -PH     Standing up from a chair using your arms (e.g., wheelchair, bedside chair)? 4  -PH     Climbing 3-5 steps with a railing? 3  -PH     To walk in hospital room? 3  -PH     AM-PAC 6 Clicks Score (PT) 22  -PH     Highest level of mobility 7 --> Walked 25 feet or more  -PH     Row Name 05/20/22 1140          Functional Assessment    Outcome Measure Options AM-PAC 6 Clicks Basic Mobility (PT)  -PH           User Key  (r) = Recorded By, (t) = Taken By, (c) = Cosigned By    Initials Name Provider Type     Kailyn Cadena PTA Physical Therapist Assistant                             Physical Therapy Education                 Title: PT OT SLP Therapies (Done)     Topic: Physical Therapy (Done)     Point: Mobility training (Done)     Learning Progress Summary           Patient Acceptance, E,D, VU,DU by  at 5/20/2022 1140      Show all documentation for this point (6)                 Point: Home exercise program (Done)     Learning Progress Summary           Patient Acceptance, E,D, VU,DU by  at 5/20/2022 1140      Show all documentation for this point (5)                 Point: Body  mechanics (Done)     Learning Progress Summary           Patient Acceptance, E,D, VU,DU by  at 5/20/2022 1140      Show all documentation for this point (6)                 Point: Precautions (Done)     Learning Progress Summary           Patient Acceptance, E,D, VU,DU by  at 5/20/2022 1140      Show all documentation for this point (6)                             User Key     Initials Effective Dates Name Provider Type [facility] 06/16/21 -  Kailyn Cadena PTA Physical Therapist Assistant PT              PT Recommendation and Plan     Plan of Care Reviewed With: patient  Progress: improving  Outcome Evaluation: Pt making improvements w/ PT today. Pt in bed and sat up to EOB w/ mod I and dressed himself at EOB w/ mod I. Pt stood req SBA and use of fww then amb 210' around nsg station w/ a very slow, antalgic gait. Pt req SBA and use of fww for amb w/ pt steady and no overt safety issues noted. Pt in chair at end of session w/ chair alarm set and RN notified. PT will sign off at this time w/ acute goals met.     Time Calculation:    PT Charges     Row Name 05/20/22 1143             Time Calculation    Start Time 0819  -PH      Stop Time 0848  -PH      Time Calculation (min) 29 min  -PH      PT Received On 05/20/22  -PH      PT - Next Appointment 05/21/22  -PH              Timed Charges    49868 - PT Therapeutic Activity Minutes 29  -PH              Total Minutes    Timed Charges Total Minutes 29  -PH       Total Minutes 29  -PH            User Key  (r) = Recorded By, (t) = Taken By, (c) = Cosigned By    Initials Name Provider Type     Kailyn Cadena PTA Physical Therapist Assistant              Therapy Charges for Today     Code Description Service Date Service Provider Modifiers Qty    60689235171 HC PT THER PROC EA 15 MIN 5/19/2022 Kailyn Cadena PTA GP 1    79683288130 HC PT THERAPEUTIC ACT EA 15 MIN 5/19/2022 Kailyn Cadena PTA GP 1    64055981270 HC PT THER SUPP EA  15 MIN 5/19/2022 Kailyn Cadena PTA GP 1    96191285128 HC PT THERAPEUTIC ACT EA 15 MIN 5/20/2022 Kailyn Cadena PTA GP 2          PT G-Codes  Outcome Measure Options: AM-PAC 6 Clicks Basic Mobility (PT)  AM-PAC 6 Clicks Score (PT): 22  AM-PAC 6 Clicks Score (OT): 14  Modified Keke Scale: 4 - Moderately severe disability.  Unable to walk without assistance, and unable to attend to own bodily needs without assistance.    Kailyn Cadena PTA  5/20/2022

## 2022-05-20 NOTE — PROGRESS NOTES
Name: Bogdan Jeter ADMIT: 2022   : 1975  PCP: Provider, No Known    MRN: [id number] LOS: 11 days   AGE/SEX: 46 y.o. male  ROOM: [facility]     Subjective   Subjective   Heart rate is a little bit better today. The patient requests an outpatient referral to dermatology. Still awaiting his echocardiogram     Objective   Objective   Vital Signs  Temp:  [98 °F (36.7 °C)-98.5 °F (36.9 °C)] 98.4 °F (36.9 °C)  Heart Rate:  [] 90  Resp:  [18] 18  BP: ()/(42-92) 100/73  SpO2:  [95 %-99 %] 98 %  on   ;   Device (Oxygen Therapy): room air  Body mass index is 24.1 kg/m².  Physical Exam  Constitutional:       General: He is not in acute distress.     Appearance: He is not ill-appearing.   HENT:      Head:      Comments: Multiple subQ nodules on face, chronic  Cardiovascular:      Rate and Rhythm: Normal rate and regular rhythm.      Heart sounds: Normal heart sounds.   Pulmonary:      Effort: Pulmonary effort is normal.      Breath sounds: Normal breath sounds.   Abdominal:      General: Bowel sounds are normal.      Palpations: Abdomen is soft.   Musculoskeletal:         General: No tenderness.      Right lower leg: No edema.      Left lower leg: No edema.   Neurological:      Mental Status: He is alert.   Psychiatric:         Mood and Affect: Mood normal.         Behavior: Behavior normal.         Results Review     I reviewed the patient's new clinical results.  Results from last 7 days   Lab Units 22  0529 22  0544 22  0621 22  0343   WBC 10*3/mm3 8.60 6.80 6.37 8.54   HEMOGLOBIN g/dL 14.3 14.7 13.7 14.5   PLATELETS 10*3/mm3 386 303 287 205     Results from last 7 days   Lab Units 22  0450 22  0529 22  1133 22  0621   SODIUM mmol/L 132* 129* 133* 133*   POTASSIUM mmol/L 4.9 5.0 4.4 4.3   CHLORIDE mmol/L 97* 95* 98 99   CO2 mmol/L 26.0 23.0 22.8 23.9   BUN mg/dL 12 9 9 10   CREATININE mg/dL 0.75* 0.66* 0.59* 0.61*   GLUCOSE mg/dL 95 98 125* 94    Estimated Creatinine Clearance: 135 mL/min (A) (by C-G formula based on SCr of 0.75 mg/dL (L)).  Results from last 7 days   Lab Units 05/18/22  1133   ALBUMIN g/dL 3.70   BILIRUBIN mg/dL 0.3   ALK PHOS U/L 101   AST (SGOT) U/L 35   ALT (SGPT) U/L 30     Results from last 7 days   Lab Units 05/20/22  0450 05/19/22  0529 05/18/22  1133 05/18/22  0544 05/17/22  0621 05/15/22  0430 05/14/22  0343   CALCIUM mg/dL 9.5 9.8 10.0  --  9.6 9.5 9.4   ALBUMIN g/dL  --   --  3.70  --   --   --   --    MAGNESIUM mg/dL  --  2.2  --  2.5 2.0 1.7  --    PHOSPHORUS mg/dL  --   --   --   --  4.0 5.0* 3.7     Results from last 7 days   Lab Units 05/14/22  0343   PROCALCITONIN ng/mL 0.16     COVID19   Date Value Ref Range Status   05/10/2022 Not Detected Not Detected - Ref. Range Final     No results found for: HGBA1C, POCGLU    XR Chest 1 View  Narrative: SINGLE VIEW OF THE CHEST     HISTORY: Respiratory failure     COMPARISON: None available.     FINDINGS:  Heart size is within normal limits for technique. There appears to be  some vascular congestion. There is bibasilar atelectasis. No  pneumothorax is seen. No definite effusion is identified.     Impression: Vascular congestion, as well as bibasilar atelectasis.     This report was finalized on 5/14/2022 4:49 AM by Dr. Tammy Mir M.D.       Scheduled Medications  enoxaparin, 40 mg, Subcutaneous, Nightly  folic acid, 1 mg, Oral, Daily  gabapentin, 100 mg, Oral, Q12H  lidocaine, 2 patch, Transdermal, Q24H  metoprolol tartrate, 25 mg, Oral, Q12H  nicotine, 1 patch, Transdermal, Q24H  sodium chloride, 10 mL, Intravenous, Q12H  thiamine, 100 mg, Oral, Daily    Infusions   Diet  Diet Regular       Assessment/Plan     Active Hospital Problems    Diagnosis  POA   • **Intractable back pain [M54.9]  Yes   • Elevated BP without diagnosis of hypertension [R03.0]  No   • Sinus tachycardia [R00.0]  No   • Hypokalemia [E87.6]  Yes   • Tobacco abuse [Z72.0]  Yes   • Alcohol withdrawal  ([facility]) [F10.239]  Yes   • Heart burn [R12]  Yes      Resolved Hospital Problems   No resolved problems to display.       46 y.o. male admitted with Intractable back pain.    Mr. Jeter is a 46 year old male who presented to the hospital with complaints of severe back pain. Hospital course complicated by severe alcohol withdrawal requiring transfer to ICU.      · Severe alcohol withdrawal requiring high doses of Ativan as well as transfer to the ICU and Precedex. Off Precedex and transferred out of ICU. CIWA scores remain low off scheduled ativan. Continue PRN Ativan per CIWA protocol-has not required any yet. Continue vitamin replacement. Access following.  · Sinus Tachycardia: suspect due to withdrawal. Asymptomatic. Started on metoprolol and cardiology has evaluated. Awaiting echocardiogram.   · Elevated BP w/o dx of HTN: suspected to be related to withdrawal. resolved  · Intractable back pain: MRI now returned with some DDD but no acute fracture/compression/disc herniation. L4-5 with shortened pedicles and mild central canal stenosis. Nothing surgical it seems. Orthopedic surgery was consulted--they agree with supportive care with pain meds, PT, and gabapentin. on po Lortab. PT evaluation noted. Very weak. SNF recommended at [place].   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 This will need reevaluation in the outpatient setting. I have placed a referral to dermatology in [facility]  · Tobacco use: Nicotine patch  · Lovenox 40 mg SC daily for DVT prophylaxis.  · Full code.  · Discussed with patient and nursing staff.  · Anticipate discharge to SNU facility once arrangements have been made. assuming that his echo is unremarkable      Murali Aquino MD  [facility]  05/20/22  13:17 EDT    I wore protective equipment throughout this patient encounter including  a face mask, gloves and protective eyewear.  Hand hygiene was performed before donning protective equipment and after removal when leaving the room.

## 2022-05-20 NOTE — PLAN OF CARE
Goal Outcome Evaluation:  Plan of Care Reviewed With: patient        Progress: improving  Outcome Evaluation: Pt making improvements w/ PT today. Pt in bed and sat up to EOB w/ mod I and dressed himself at EOB w/ mod I. Pt stood req SBA and use of fww then amb 210' around nsg station w/ a very slow, antalgic gait. Pt req SBA and use of fww for amb w/ pt steady and no overt safety issues noted. Pt in chair at end of session w/ chair alarm set and RN notified. PT will sign off at this time w/ acute goals met.    Patient was intermittently wearing a face mask during this therapy encounter. Therapist used appropriate personal protective equipment including eye protection, mask, and gloves.  Mask used was standard procedure mask. Appropriate PPE was worn during the entire therapy session. Hand hygiene was completed before and after therapy session. Patient is not in enhanced droplet precautions.

## 2022-05-20 NOTE — DISCHARGE SUMMARY
Patient Name: Bogdan Jeter  : 1975  MRN: [id number]    Date of Admission: 2022  Date of Discharge:  2022  Primary Care Physician: Provider, No Known      Chief Complaint:   Back Pain      Discharge Diagnoses     Active Hospital Problems    Diagnosis  POA   • Chronic systolic heart failure (CMS/HCC) [I50.22]  Yes   • Sinus tachycardia [R00.0]  No   • Tobacco abuse [Z72.0]  Yes      Resolved Hospital Problems    Diagnosis Date Resolved POA   • **Intractable back pain [M54.9] 2022 Yes   • Elevated BP without diagnosis of hypertension [R03.0] 2022 No   • Hypokalemia [E87.6] 2022 Yes   • Alcohol withdrawal (HCC) [F10.239] 2022 Yes   • Heart burn [R12] 2022 Yes        Hospital Course     Mr. Jeter is a 46 y.o. male with a history of alcohol dependence, multiple chronic subq nodular and cystic lesions on his face who presented to [facility] initially complaining of intractable back pain.  Please see the admitting history and physical for further details. Due to the severity of his pain he was admitted to the hospital for further evaluation and treatment.     Unfortunately, shortly after admission, he developed severe alcohol withdrawal and required transfer to the ICU for precedex drip. Once he was out of acute withdrawal, an MRI was performed which showed DDD, but no acute fracture/compression/disc herniation. L4-5 with shortnened pedicles and mild central canal stenosis. Orthopedic surgery evaluated and recommended non-operative treatment with gabapentin, pain medications, lidocaine patches, and physical therapy.     His course was complicated by fairly severe sinus tachycardia into the 140s. It was initially attributed to alcohol withdrawal, but it persisted after his withdrawal had ended. An echocardiogram was checked and cardiology evaluated. The echocardiogram showed an EF of 18%. Metoprolol and ramipril were initiated. The patient is euvolemic, so  no diuretics were added. He is to follow up with cardiology in 1 week for an ischemic evaluation, but his cardiomyopathy is felt to most likely be related to alcohol based on his history.    As mentioned above, he has chronic and prominent subq nodular and cystic lesions on his face. He reports to me that many years ago he had this evaluated by a dermatologist but was lost to follow up. I recommended to him that he follow back up with his dermatologist to have these reevaluated and treated. He requested a referral to dermatology here, which has been ordered at discharge.    He is being discharged to subacute rehab today.    Day of Discharge     Subjective:  Heart rate is improved. Echo shows newly diagnosed systolic heart failure. Discussed with Dr. Villagomez. Pt is euvolemic and heart failure is most likely related to alcohol. Adding BB and ACEi and will need follow up in clinic in a week for ischemic evaluation.     Physical Exam:  Temp:  [98 °F (36.7 °C)-98.5 °F (36.9 °C)] 98.4 °F (36.9 °C)  Heart Rate:  [] 88  Resp:  [18] 18  BP: (100-134)/(70-92) 118/70  Body mass index is 24 kg/m².  Physical Exam  Constitutional:       General: He is not in acute distress.     Appearance: He is not ill-appearing.   HENT:      Head:      Comments: Multiple subQ nodules on face, chronic  Cardiovascular:      Rate and Rhythm: Normal rate and regular rhythm.      Heart sounds: Normal heart sounds.   Pulmonary:      Effort: Pulmonary effort is normal.      Breath sounds: Normal breath sounds.   Abdominal:      General: Bowel sounds are normal.      Palpations: Abdomen is soft.   Musculoskeletal:         General: No tenderness.      Right lower leg: No edema.      Left lower leg: No edema.   Neurological:      Mental Status: He is alert.   Psychiatric:         Mood and Affect: Mood normal.         Behavior: Behavior normal.     Consultants     Consult Orders (all) (From admission, onward)     Start     Ordered    05/19/22 4783   Inpatient Cardiology Consult  Once        Specialty:  Cardiology  Provider:  Catracho Sylvester III, MD    05/19/22 1332    05/11/22 1335  Inpatient Pulmonology Consult  Once        Specialty:  Pulmonary Disease  Provider:  Jose Alberto Ferguson MD    05/11/22 1336    05/11/22 1244  Inpatient Pulmonology Consult  Once,   Status:  Canceled        Specialty:  Pulmonary Disease  Provider:  (Not yet assigned)    05/11/22 1248    05/10/22 1326  Inpatient Access Center Consult  Once        Provider:  (Not yet assigned)    05/10/22 1325    05/09/22 1923  Inpatient Orthopedic Surgery Consult  Once        Specialty:  Orthopedic Surgery  Provider:  Chuckie Miner MD    05/09/22 1924    05/09/22 1815  Inpatient Case Management  Consult  Once        Provider:  (Not yet assigned)    05/09/22 1814 05/09/22 1351  [facility] (on-call MD unless specified) Details  Once        Specialty:  Hospitalist  Provider:  Cony Damian MD    05/09/22 1350              Procedures     Imaging Results (All)     Procedure Component Value Units Date/Time    XR Chest 1 View [[id number]] Collected: 05/14/22 0447     Updated: 05/14/22 0452    Narrative:      SINGLE VIEW OF THE CHEST     HISTORY: Respiratory failure     COMPARISON: None available.     FINDINGS:  Heart size is within normal limits for technique. There appears to be  some vascular congestion. There is bibasilar atelectasis. No  pneumothorax is seen. No definite effusion is identified.       Impression:      Vascular congestion, as well as bibasilar atelectasis.     This report was finalized on 5/14/2022 4:49 AM by Dr. Tammy Mir M.D.       MRI Lumbar Spine With & Without Contrast [[id number]] Collected: 05/10/22 1226     Updated: 05/10/22 1409    Narrative:      MRI EXAMINATION OF THE LUMBAR SPINE WITH AND WITHOUT CONTRAST     HISTORY: Back pain, left radiculopathy.     COMPARISON: None.     FINDINGS: The alignment of the lumbar spine is within normal limits.  Mild disc  desiccation and loss of disc height is noted at L4-L5 and  L5-S1. The conus is at T12-L1.     L1-L2: There is no evidence of disc bulge or herniation.     L2-L3: There is no evidence of disc bulge or herniation.     L3-L4: A mild central broad-based disc osteophyte complex is present  which results in mild flattening of ventral surface of the thecal sac.  Mild facet degenerative disease is present bilaterally.     L4-L5: Mild-to-moderate facet degenerative disease is present  bilaterally. There is mild central canal stenosis and mild-to-moderate  lateral recess narrowing bilaterally secondary to a broad-based disc  osteophyte complex and posterior element degenerative disease. This is  accentuated by congenitally shortened pedicles. Mild foraminal stenosis  is present bilaterally.     L5-S1: Transitional anatomy is appreciated with partial sacralization of  L5. Mild facet degenerative disease is present bilaterally. A central  disc osteophyte complex is present which results in mild flattening of  ventral surface of the thecal sac. It approaches but does not abut or  displace the traversing S1 nerve roots. Mild facet degenerative disease  is present bilaterally.       Impression:      No evidence of disc herniation, compression fracture or  compression deformity. Multilevel degenerative disease involving the  lumbar spine is noted as described above including mild facet  degenerative disease and broad-based disc osteophyte complexes. The  degenerative disease is accentuated by congenitally shortened pedicles  at L4-L5 and L5-S1. At L4-L5 there is mild central canal stenosis and  mild-to-moderate lateral recess narrowing bilaterally. A central disc  osteophyte complex is present at L5-S1 which approaches but does not  abut or displace the traversing S1 nerve roots.     This report was finalized on 5/10/2022 2:06 PM by Dr. Fabiano Godwin M.D.       XR Spine Lumbar Complete 4+VW [[id number]] Collected: 05/09/22 1132      Updated: 05/09/22 1141    Narrative:      LUMBAR SPINE X-RAYS     HISTORY: Back pain following injury.     TECHNIQUE: Five x-rays of the lumbosacral spine are provided. There is  no previous imaging for correlation.     FINDINGS: There is atheromatous vascular calcification along the  abdominal aorta. Lumbar vertebral body height, alignment, and disc  height are normal. No pars defect or bone lesion is present. The  visualized upper pelvic bones and joints appear normal and the bowel gas  pattern appears normal.       Impression:      Atheromatous vascular calcification. There is degenerative  facet joint change L5-S1 without subluxation. Otherwise negative lumbar  x-rays.     This report was finalized on 5/9/2022 11:38 AM by Dr. Kehinde Dudley M.D.             Pertinent Labs     Results from last 7 days   Lab Units 05/19/22  0529 05/18/22  0544 05/17/22  0621 05/14/22  0343   WBC 10*3/mm3 8.60 6.80 6.37 8.54   HEMOGLOBIN g/dL 14.3 14.7 13.7 14.5   PLATELETS 10*3/mm3 386 303 287 205     Results from last 7 days   Lab Units 05/20/22  0450 05/19/22  0529 05/18/22  1133 05/17/22  0621   SODIUM mmol/L 132* 129* 133* 133*   POTASSIUM mmol/L 4.9 5.0 4.4 4.3   CHLORIDE mmol/L 97* 95* 98 99   CO2 mmol/L 26.0 23.0 22.8 23.9   BUN mg/dL 12 9 9 10   CREATININE mg/dL 0.75* 0.66* 0.59* 0.61*   GLUCOSE mg/dL 95 98 125* 94   Estimated Creatinine Clearance: 134.3 mL/min (A) (by C-G formula based on SCr of 0.75 mg/dL (L)).  Results from last 7 days   Lab Units 05/18/22  1133   ALBUMIN g/dL 3.70   BILIRUBIN mg/dL 0.3   ALK PHOS U/L 101   AST (SGOT) U/L 35   ALT (SGPT) U/L 30     Results from last 7 days   Lab Units 05/20/22  0450 05/19/22  0529 05/18/22  1133 05/18/22  0544 05/17/22  0621 05/15/22  0430 05/14/22  0343   CALCIUM mg/dL 9.5 9.8 10.0  --  9.6 9.5 9.4   ALBUMIN g/dL  --   --  3.70  --   --   --   --    MAGNESIUM mg/dL  --  2.2  --  2.5 2.0 1.7  --    PHOSPHORUS mg/dL  --   --   --   --  4.0 5.0* 3.7         Results  from last 7 days   Lab Units 05/20/22  0450 05/19/22  1557   SODIUM UR mmol/L  --  198   OSMOLALITY UR mOsm/kg  --  772   URIC ACID mg/dL 4.0  --          Invalid input(s): LDLCALC        Test Results Pending at Discharge       Discharge Details        Discharge Medications      New Medications      Instructions Start Date   folic acid 1 MG tablet  Commonly known as: FOLVITE   1 mg, Oral, Daily   Start Date: May 21, 2022     gabapentin 100 MG capsule  Commonly known as: NEURONTIN   100 mg, Oral, Every 12 Hours Scheduled      HYDROcodone-acetaminophen  MG per tablet  Commonly known as: NORCO   1 tablet, Oral, Every 6 Hours PRN      lidocaine 5 %  Commonly known as: LIDODERM   2 patches, Transdermal, Every 24 Hours Scheduled, Remove & Discard patch within 12 hours or as directed by MD   Start Date: May 21, 2022     metoprolol succinate XL 25 MG 24 hr tablet  Commonly known as: TOPROL-XL   25 mg, Oral, Every 24 Hours Scheduled      nicotine 21 MG/24HR patch  Commonly known as: NICODERM CQ   1 patch, Transdermal, Every 24 Hours Scheduled   Start Date: May 21, 2022     ramipril 1.25 MG capsule  Commonly known as: ALTACE   1.25 mg, Oral, Every 24 Hours Scheduled      thiamine 100 MG tablet  tablet  Commonly known as: VITAMIN B-1   100 mg, Oral, Daily   Start Date: May 21, 2022            No Known Allergies      Discharge Disposition:  Skilled Nursing Facility (DC - External)    Discharge Diet:  Diet Order   Procedures   • Diet Regular       Discharge Activity:   Activity Instructions     Activity as Tolerated            CODE STATUS:    Code Status and Medical Interventions:   Ordered at: 05/09/22 1924     Code Status (Patient has no pulse and is not breathing):    CPR (Attempt to Resuscitate)     Medical Interventions (Patient has pulse or is breathing):    Full Support       No future appointments.  Additional Instructions for the Follow-ups that You Need to Schedule     Call MD With Problems / Concerns   As  directed      Instructions: return to the hosptial if you experience chest pain, shortness of breath, abdominal pain, nausea, vomiting, fevers, sweats, chills, or worsening of your symptoms    Order Comments: Instructions: return to the hosptial if you experience chest pain, shortness of breath, abdominal pain, nausea, vomiting, fevers, sweats, chills, or worsening of your symptoms          Discharge Follow-up with Specialty: Cardiology, 1 week   As directed      Specialty: Cardiology, 1 week            Contact information for follow-up providers     Josh Lopez MD .    Specialty: Dermatology  Contact information:  [address]  [address]  [facility] [id number] [phone]             Sharona Villagomez MD Follow up.    Specialty: Cardiology  Contact information:  [address]  [address]  [facility] [id number] [phone]             Provider, No Known .    Contact information:  [facility] [address] [phone]                   Contact information for after-discharge care     Destination     [facility] .    Service: Skilled Nursing  Contact information:  [address] [facility] [address] [phone]                             Additional Instructions for the Follow-ups that You Need to Schedule     Call MD With Problems / Concerns   As directed      Instructions: return to the hosptial if you experience chest pain, shortness of breath, abdominal pain, nausea, vomiting, fevers, sweats, chills, or worsening of your symptoms    Order Comments: Instructions: return to the hosptial if you experience chest pain, shortness of breath, abdominal pain, nausea, vomiting, fevers, sweats, chills, or worsening of your symptoms          Discharge Follow-up with Specialty: Cardiology, 1 week   As directed      Specialty: Cardiology, 1 week           Time Spent on Discharge:  Greater than 30 minutes      Murali Aquino MD  [facility]  05/20/22  15:44 EDT

## 2022-05-20 NOTE — CASE MANAGEMENT/SOCIAL WORK
Continued Stay Note  [facility]     Patient Name: Bogdan Jeter  MRN: [id number]  Today's Date: 5/20/2022    Admit Date: 5/9/2022     Discharge Plan     Row Name 05/20/22 1436       Plan    Plan [facility] via family transport    Patient/Family in Agreement with Plan yes    Plan Comments Spoke with [facility] pt accepted and can admit today or tomorrow. Spoke with pt at bedside and he confirms he still plans on going to snf to work on strength. CCP left message for PT. Pt to dc after ECHO completed and cardiology signed off. Pt states family will be able to transport him. Packet in [facility]. [facility]               Discharge Codes    No documentation.               Expected Discharge Date and Time     Expected Discharge Date Expected Discharge Time    May 20, 2022             Dian Rodriguez RN

## 2022-05-20 NOTE — NURSING NOTE
ACCESS CENTER     Patient awake and alert in bed, oriented x 3, positive mood, denies SI/HI/AVH. Patient rested well throughout the night and hoping to go home soon. Patient nurse reported CIWA of 0 and making progress. Access will continue to follow up.

## 2022-05-20 NOTE — PROGRESS NOTES
"Patient Name: Bogdan Jeter  :1975  46 y.o.      Patient Care Team:  Provider, No Known as PCP - General    Interval History:   Echo pending    Subjective:  Following for sinus tachycardia    Objective   Vital Signs  Temp:  [98 °F (36.7 °C)-98.5 °F (36.9 °C)] 98.5 °F (36.9 °C)  Heart Rate:  [] 90  Resp:  [18] 18  BP: ()/(42-92) 131/86    Intake/Output Summary (Last 24 hours) at 2022 1201  Last data filed at 2022 0900  Gross per 24 hour   Intake 240 ml   Output 1090 ml   Net -850 ml     Flowsheet Rows    Flowsheet Row First Filed Value   Admission Height 180.3 cm (71\") Documented at 2022 184   Admission Weight 72.6 kg (160 lb) Documented at 2022 184          Physical Exam:   General Appearance:    Alert, cooperative, in no acute distress   Lungs:     Clear to auscultation.  Normal respiratory effort and rate.      Heart:    Regular rhythm and normal rate, normal S1 and S2, no murmurs, gallops or rubs.     Chest Wall:    No abnormalities observed   Abdomen:     Soft, nontender, positive bowel sounds.     Extremities:   no cyanosis, clubbing or edema.  No marked joint deformities.  Adequate musculoskeletal strength.       Results Review:    Results from last 7 days   Lab Units 22  0450   SODIUM mmol/L 132*   POTASSIUM mmol/L 4.9   CHLORIDE mmol/L 97*   CO2 mmol/L 26.0   BUN mg/dL 12   CREATININE mg/dL 0.75*   GLUCOSE mg/dL 95   CALCIUM mg/dL 9.5         Results from last 7 days   Lab Units 22  0529   WBC 10*3/mm3 8.60   HEMOGLOBIN g/dL 14.3   HEMATOCRIT % 40.8   PLATELETS 10*3/mm3 386             Results from last 7 days   Lab Units 22  0529   MAGNESIUM mg/dL 2.2             Medication Review:   enoxaparin, 40 mg, Subcutaneous, Nightly  folic acid, 1 mg, Oral, Daily  gabapentin, 100 mg, Oral, Q12H  lidocaine, 2 patch, Transdermal, Q24H  metoprolol tartrate, 25 mg, Oral, Q12H  nicotine, 1 patch, Transdermal, Q24H  sodium chloride, 10 mL, Intravenous, " Q12H  thiamine, 100 mg, Oral, Daily              Assessment & Plan     1.  Sinus tachycardia associated with alcohol withdrawal.  TSH normal.  Not anemic.  Continue metoprolol.  2.  Abnormal EKG.  Echo pending.    Continue with metoprolol.  If his echo looks normal I am fine with him going home today.    Addendum: Echo shows ejection fraction to be 18%.  He is euvolemic on exam.  Going to change the metoprolol over to metoprolol succinate.  I am going to start him on a low-dose of ramipril.  He will follow-up in the office next week and we will work on arranging an ischemic evaluation but this is most likely an alcoholic cardiomyopathy.  Okay with discharge.    Sharona Villagomez MD, [facility]  05/20/22  12:01 EDT

## 2022-05-23 NOTE — PAYOR COMM NOTE
"Bogdan Pearson (46 y.o. Male)     PLEASE SEE ATTACHED DC SUMMARY    REF#[id number]    THANK YOU    BRIANNA SEWELL LPN CCP            Date of Birth   1975    Social Security Number       Address   [address] [place], [place] [address]    Home Phone   [phone]    MRN   [id number]       Caodaism   None    Marital Status   Single                            Admission Date   22    Admission Type   Emergency    Admitting Provider   Cony Damian MD    Attending Provider       Department, Room/Bed   [address], [address]/1       Discharge Date   2022    Discharge Disposition   Skilled Nursing Facility (DC - External)    Discharge Destination                               Attending Provider: (none)   Allergies: No Known Allergies    Isolation: None   Infection: None   Code Status: Prior   Advance Care Planning Activity    Ht: 180.3 cm (70.98\")   Wt: 78 kg (172 lb)    Admission Cmt: None   Principal Problem: Intractable back pain [M54.9]                 Active Insurance as of 2022     Primary Coverage     Payor Plan Insurance Group Employer/Plan Group    [facility] BY VALERI [facility] BY VALERI [id number]     Payor Plan Address Payor Plan Phone Number Payor Plan Fax Number Effective Dates    [address]   2021 - None Entered    [facility] [id number]       Subscriber Name Subscriber Birth Date Member ID       BOGDAN PEARSON 1975 [id number]                 Emergency Contacts      (Rel.) Home Phone Work Phone Mobile Phone    KRYSTAL WEBSTER (Father) [phone] -- [phone]               Discharge Summary      Murali Aquino MD at 22 1544              Patient Name: Bogdan Pearson  : 1975  MRN: [id number]    Date of Admission: 2022  Date of Discharge:  2022  Primary Care Physician: Provider, No Known      Chief Complaint:   Back Pain      Discharge Diagnoses     Active Hospital Problems    Diagnosis  POA   • Chronic systolic " heart failure (CMS/HCC) [I50.22]  Yes   • Sinus tachycardia [R00.0]  No   • Tobacco abuse [Z72.0]  Yes      Resolved Hospital Problems    Diagnosis Date Resolved POA   • **Intractable back pain [M54.9] 05/20/2022 Yes   • Elevated BP without diagnosis of hypertension [R03.0] 05/20/2022 No   • Hypokalemia [E87.6] 05/20/2022 Yes   • Alcohol withdrawal (HCC) [F10.239] 05/20/2022 Yes   • Heart burn [R12] 05/20/2022 Yes        Hospital Course     Mr. Jeter is a 46 y.o. 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  Please see the admitting history and physical for further details. Due to the severity of his pain he was admitted to the hospital for further evaluation and treatment.     Unfortunately, shortly after admission, he developed severe alcohol withdrawal and required transfer to the ICU for precedex drip. Once he was out of acute withdrawal, an MRI was performed which showed DDD, but no acute fracture/compression/disc herniation. L4-5 with shortnened pedicles and mild central canal stenosis. Orthopedic surgery evaluated and recommended non-operative treatment with gabapentin, pain medications, lidocaine patches, and physical therapy.     His course was complicated by fairly severe sinus tachycardia into the 140s. It was initially attributed to alcohol withdrawal, but it persisted after his withdrawal had ended. An echocardiogram was checked and cardiology evaluated. The echocardiogram showed an EF of 18%. Metoprolol and ramipril were initiated. The patient is euvolemic, so no diuretics were added. He is to follow up with cardiology in 1 week for an ischemic evaluation, but his cardiomyopathy is felt to most likely be related to alcohol based on his history.    As mentioned above, he has chronic and prominent subq nodular and cystic lesions on his face. He reports to  me that many years ago he had this evaluated by a dermatologist but was lost to follow up. I recommended to him that he follow back up with his dermatologist to have these reevaluated and treated. He requested a referral to dermatology here, which has been ordered at discharge.    He is being discharged to subacute rehab today.    Day of Discharge     Subjective:  Heart rate is improved. Echo shows newly diagnosed systolic heart failure. Discussed with Dr. Villagomez. Pt is euvolemic and heart failure is most likely related to alcohol. Adding BB and ACEi and will need follow up in clinic in a week for ischemic evaluation.     Physical Exam:  Temp:  [98 °F (36.7 °C)-98.5 °F (36.9 °C)] 98.4 °F (36.9 °C)  Heart Rate:  [] 88  Resp:  [18] 18  BP: (100-134)/(70-92) 118/70  Body mass index is 24 kg/m².  Physical Exam  Constitutional:       General: He is not in acute distress.     Appearance: He is not ill-appearing.   HENT:      Head:      Comments: Multiple subQ nodules on face, chronic  Cardiovascular:      Rate and Rhythm: Normal rate and regular rhythm.      Heart sounds: Normal heart sounds.   Pulmonary:      Effort: Pulmonary effort is normal.      Breath sounds: Normal breath sounds.   Abdominal:      General: Bowel sounds are normal.      Palpations: Abdomen is soft.   Musculoskeletal:         General: No tenderness.      Right lower leg: No edema.      Left lower leg: No edema.   Neurological:      Mental Status: He is alert.   Psychiatric:         Mood and Affect: Mood normal.         Behavior: Behavior normal.     Consultants     Consult Orders (all) (From admission, onward)     Start     Ordered    05/19/22 1333  Inpatient Cardiology Consult  Once        Specialty:  Cardiology  Provider:  Catracho Sylvester III, MD    05/19/22 1332    05/11/22 1335  Inpatient Pulmonology Consult  Once        Specialty:  Pulmonary Disease  Provider:  Jose Alberto Ferguson MD    05/11/22 1336    05/11/22 1244  Inpatient Pulmonology  Consult  Once,   Status:  Canceled        Specialty:  Pulmonary Disease  Provider:  (Not yet assigned)    05/11/22 1248    05/10/22 1326  Inpatient Access Center Consult  Once        Provider:  (Not yet assigned)    05/10/22 1325    05/09/22 1923  Inpatient Orthopedic Surgery Consult  Once        Specialty:  Orthopedic Surgery  Provider:  Chuckie Miner MD    05/09/22 1924 05/09/22 1815  Inpatient Case Management  Consult  Once        Provider:  (Not yet assigned)    05/09/22 1814 05/09/22 1351  [facility] (on-call MD unless specified) Details  Once        Specialty:  Hospitalist  Provider:  Cony Damian MD    05/09/22 1350              Procedures     Imaging Results (All)     Procedure Component Value Units Date/Time    XR Chest 1 View [[id number]] Collected: 05/14/22 0447     Updated: 05/14/22 0452    Narrative:      SINGLE VIEW OF THE CHEST     HISTORY: Respiratory failure     COMPARISON: None available.     FINDINGS:  Heart size is within normal limits for technique. There appears to be  some vascular congestion. There is bibasilar atelectasis. No  pneumothorax is seen. No definite effusion is identified.       Impression:      Vascular congestion, as well as bibasilar atelectasis.     This report was finalized on 5/14/2022 4:49 AM by Dr. Tammy Mir M.D.       MRI Lumbar Spine With & Without Contrast [[id number]] Collected: 05/10/22 1226     Updated: 05/10/22 1409    Narrative:      MRI EXAMINATION OF THE LUMBAR SPINE WITH AND WITHOUT CONTRAST     HISTORY: Back pain, left radiculopathy.     COMPARISON: None.     FINDINGS: The alignment of the lumbar spine is within normal limits.  Mild disc desiccation and loss of disc height is noted at L4-L5 and  L5-S1. The conus is at T12-L1.     L1-L2: There is no evidence of disc bulge or herniation.     L2-L3: There is no evidence of disc bulge or herniation.     L3-L4: A mild central broad-based disc osteophyte complex is present  which results in  mild flattening of ventral surface of the thecal sac.  Mild facet degenerative disease is present bilaterally.     L4-L5: Mild-to-moderate facet degenerative disease is present  bilaterally. There is mild central canal stenosis and mild-to-moderate  lateral recess narrowing bilaterally secondary to a broad-based disc  osteophyte complex and posterior element degenerative disease. This is  accentuated by congenitally shortened pedicles. Mild foraminal stenosis  is present bilaterally.     L5-S1: Transitional anatomy is appreciated with partial sacralization of  L5. Mild facet degenerative disease is present bilaterally. A central  disc osteophyte complex is present which results in mild flattening of  ventral surface of the thecal sac. It approaches but does not abut or  displace the traversing S1 nerve roots. Mild facet degenerative disease  is present bilaterally.       Impression:      No evidence of disc herniation, compression fracture or  compression deformity. Multilevel degenerative disease involving the  lumbar spine is noted as described above including mild facet  degenerative disease and broad-based disc osteophyte complexes. The  degenerative disease is accentuated by congenitally shortened pedicles  at L4-L5 and L5-S1. At L4-L5 there is mild central canal stenosis and  mild-to-moderate lateral recess narrowing bilaterally. A central disc  osteophyte complex is present at L5-S1 which approaches but does not  abut or displace the traversing S1 nerve roots.     This report was finalized on 5/10/2022 2:06 PM by Dr. Fabiano Godwin M.D.       XR Spine Lumbar Complete 4+VW [[id number]] Collected: 05/09/22 1136     Updated: 05/09/22 1141    Narrative:      LUMBAR SPINE X-RAYS     HISTORY: Back pain following injury.     TECHNIQUE: Five x-rays of the lumbosacral spine are provided. There is  no previous imaging for correlation.     FINDINGS: There is atheromatous vascular calcification along the  abdominal  aorta. Lumbar vertebral body height, alignment, and disc  height are normal. No pars defect or bone lesion is present. The  visualized upper pelvic bones and joints appear normal and the bowel gas  pattern appears normal.       Impression:      Atheromatous vascular calcification. There is degenerative  facet joint change L5-S1 without subluxation. Otherwise negative lumbar  x-rays.     This report was finalized on 5/9/2022 11:38 AM by Dr. Kehinde Dudley M.D.             Pertinent Labs     Results from last 7 days   Lab Units 05/19/22  0529 05/18/22  0544 05/17/22  0621 05/14/22  0343   WBC 10*3/mm3 8.60 6.80 6.37 8.54   HEMOGLOBIN g/dL 14.3 14.7 13.7 14.5   PLATELETS 10*3/mm3 386 303 287 205     Results from last 7 days   Lab Units 05/20/22  0450 05/19/22  0529 05/18/22  1133 05/17/22  0621   SODIUM mmol/L 132* 129* 133* 133*   POTASSIUM mmol/L 4.9 5.0 4.4 4.3   CHLORIDE mmol/L 97* 95* 98 99   CO2 mmol/L 26.0 23.0 22.8 23.9   BUN mg/dL 12 9 9 10   CREATININE mg/dL 0.75* 0.66* 0.59* 0.61*   GLUCOSE mg/dL 95 98 125* 94   Estimated Creatinine Clearance: 134.3 mL/min (A) (by C-G formula based on SCr of 0.75 mg/dL (L)).  Results from last 7 days   Lab Units 05/18/22  1133   ALBUMIN g/dL 3.70   BILIRUBIN mg/dL 0.3   ALK PHOS U/L 101   AST (SGOT) U/L 35   ALT (SGPT) U/L 30     Results from last 7 days   Lab Units 05/20/22  0450 05/19/22  0529 05/18/22  1133 05/18/22  0544 05/17/22  0621 05/15/22  0430 05/14/22  0343   CALCIUM mg/dL 9.5 9.8 10.0  --  9.6 9.5 9.4   ALBUMIN g/dL  --   --  3.70  --   --   --   --    MAGNESIUM mg/dL  --  2.2  --  2.5 2.0 1.7  --    PHOSPHORUS mg/dL  --   --   --   --  4.0 5.0* 3.7         Results from last 7 days   Lab Units 05/20/22  0450 05/19/22  1557   SODIUM UR mmol/L  --  198   OSMOLALITY UR mOsm/kg  --  772   URIC ACID mg/dL 4.0  --          Invalid input(s): LDLCALC        Test Results Pending at Discharge       Discharge Details        Discharge Medications      New Medications       Instructions Start Date   folic acid 1 MG tablet  Commonly known as: FOLVITE   1 mg, Oral, Daily   Start Date: May 21, 2022     gabapentin 100 MG capsule  Commonly known as: NEURONTIN   100 mg, Oral, Every 12 Hours Scheduled      HYDROcodone-acetaminophen  MG per tablet  Commonly known as: NORCO   1 tablet, Oral, Every 6 Hours PRN      lidocaine 5 %  Commonly known as: LIDODERM   2 patches, Transdermal, Every 24 Hours Scheduled, Remove & Discard patch within 12 hours or as directed by MD   Start Date: May 21, 2022     metoprolol succinate XL 25 MG 24 hr tablet  Commonly known as: TOPROL-XL   25 mg, Oral, Every 24 Hours Scheduled      nicotine 21 MG/24HR patch  Commonly known as: NICODERM CQ   1 patch, Transdermal, Every 24 Hours Scheduled   Start Date: May 21, 2022     ramipril 1.25 MG capsule  Commonly known as: ALTACE   1.25 mg, Oral, Every 24 Hours Scheduled      thiamine 100 MG tablet  tablet  Commonly known as: VITAMIN B-1   100 mg, Oral, Daily   Start Date: May 21, 2022            No Known Allergies      Discharge Disposition:  Skilled Nursing Facility (DC - External)    Discharge Diet:  Diet Order   Procedures   • Diet Regular       Discharge Activity:   Activity Instructions     Activity as Tolerated            CODE STATUS:    Code Status and Medical Interventions:   Ordered at: 05/09/22 1924     Code Status (Patient has no pulse and is not breathing):    CPR (Attempt to Resuscitate)     Medical Interventions (Patient has pulse or is breathing):    Full Support       No future appointments.  Additional Instructions for the Follow-ups that You Need to Schedule     Call MD With Problems / Concerns   As directed      Instructions: return to the hosptial if you experience chest pain, shortness of breath, abdominal pain, nausea, vomiting, fevers, sweats, chills, or worsening of your symptoms    Order Comments: Instructions: return to the hosptial if you experience chest pain, shortness of breath, abdominal  pain, nausea, vomiting, fevers, sweats, chills, or worsening of your symptoms          Discharge Follow-up with Specialty: Cardiology, 1 week   As directed      Specialty: Cardiology, 1 week            Contact information for follow-up providers     Josh Lopez MD .    Specialty: Dermatology  Contact information:  [address]  [address]  [facility] [id number] [phone]             Sharona Villagomez MD Follow up.    Specialty: Cardiology  Contact information:  [address] [facility]  [address]  [facility] [id number] [phone]             Provider, No Known .    Contact information:  [facility] [address] [phone]                   Contact information for after-discharge care     Destination     [facility] .    Service: Skilled Nursing  Contact information:  [address] [facility] [address] [phone]                             Additional Instructions for the Follow-ups that You Need to Schedule     Call MD With Problems / Concerns   As directed      Instructions: return to the hosptial if you experience chest pain, shortness of breath, abdominal pain, nausea, vomiting, fevers, sweats, chills, or worsening of your symptoms    Order Comments: Instructions: return to the hosptial if you experience chest pain, shortness of breath, abdominal pain, nausea, vomiting, fevers, sweats, chills, or worsening of your symptoms          Discharge Follow-up with Specialty: Cardiology, 1 week   As directed      Specialty: Cardiology, 1 week           Time Spent on Discharge:  Greater than 30 minutes      Murali Aquino MD  [facility]  05/20/22  15:44 EDT              Electronically signed by Murali Aquino MD at 05/20/22 6085

## 2022-05-23 NOTE — PROGRESS NOTES
Case Management Discharge Note      Final Note: Pt DC'ed to [facility] for Skilled level  of care    Provided Post Acute Provider List?: N/A  N/A Provider List Comment: Patient plans to return home.  Provided Post Acute Provider Quality & Resource List?: N/A  N/A Quality & Resource List Comment: Patient plans to return home.    Selected Continued Care - Discharged on 5/20/2022 Admission date: 5/9/2022 - Discharge disposition: Skilled Nursing Facility (DC - External)    Destination Coordination complete.    Service Provider Selected Services Address Phone Fax Patient Preferred    [facility]  Skilled Nursing [address] [facility] [address] [phone] --          Durable Medical Equipment    No services have been selected for the patient.              Dialysis/Infusion    No services have been selected for the patient.              Home Medical Care    No services have been selected for the patient.              Therapy    No services have been selected for the patient.              Community Resources    No services have been selected for the patient.              Community & DME    No services have been selected for the patient.                       Final Discharge Disposition Code: 03 - skilled nursing facility (SNF)

## 2022-07-20 ENCOUNTER — HOSPITAL ENCOUNTER (OUTPATIENT)
Dept: CARDIOLOGY | Facility: HOSPITAL | Age: 47
Discharge: HOME | End: 2022-07-20
Admitting: NURSE PRACTITIONER

## 2022-07-20 ENCOUNTER — OFFICE VISIT (OUTPATIENT)
Dept: CARDIOLOGY | Facility: CLINIC | Age: 47
End: 2022-07-20

## 2022-07-20 VITALS
BODY MASS INDEX: 23.24 KG/M2 | DIASTOLIC BLOOD PRESSURE: 65 MMHG | OXYGEN SATURATION: 97 % | HEIGHT: 71 IN | SYSTOLIC BLOOD PRESSURE: 130 MMHG | WEIGHT: 166 LBS | HEART RATE: 97 BPM

## 2022-07-20 DIAGNOSIS — R00.0 SINUS TACHYCARDIA: Primary | ICD-10-CM

## 2022-07-20 DIAGNOSIS — F10.288 ALCOHOL DEPENDENCE WITH OTHER ALCOHOL-INDUCED DISORDER: ICD-10-CM

## 2022-07-20 DIAGNOSIS — I50.22 CHRONIC SYSTOLIC HEART FAILURE: ICD-10-CM

## 2022-07-20 DIAGNOSIS — I42.9 CARDIOMYOPATHY, UNSPECIFIED TYPE: ICD-10-CM

## 2022-07-20 LAB
ANION GAP SERPL CALCULATED.3IONS-SCNC: 8.7 MMOL/L (ref 5–15)
BUN SERPL-MCNC: 13 MG/DL (ref 6–20)
BUN/CREAT SERPL: 18.8 (ref 7–25)
CALCIUM SPEC-SCNC: 9.9 MG/DL (ref 8.6–10.5)
CHLORIDE SERPL-SCNC: 103 MMOL/L (ref 98–107)
CO2 SERPL-SCNC: 27.3 MMOL/L (ref 22–29)
CREAT SERPL-MCNC: 0.69 MG/DL (ref 0.76–1.27)
EGFRCR SERPLBLD CKD-EPI 2021: 114.9 ML/MIN/1.73
GLUCOSE SERPL-MCNC: 86 MG/DL (ref 65–99)
POTASSIUM SERPL-SCNC: 4.4 MMOL/L (ref 3.5–5.2)
SODIUM SERPL-SCNC: 139 MMOL/L (ref 136–145)

## 2022-07-20 PROCEDURE — 93000 ELECTROCARDIOGRAM COMPLETE: CPT | Performed by: NURSE PRACTITIONER

## 2022-07-20 PROCEDURE — 36415 COLL VENOUS BLD VENIPUNCTURE: CPT

## 2022-07-20 PROCEDURE — 80048 BASIC METABOLIC PNL TOTAL CA: CPT | Performed by: NURSE PRACTITIONER

## 2022-07-20 PROCEDURE — 99214 OFFICE O/P EST MOD 30 MIN: CPT | Performed by: NURSE PRACTITIONER

## 2022-07-20 RX ORDER — METOPROLOL SUCCINATE 50 MG/1
50 TABLET, EXTENDED RELEASE ORAL
Qty: 90 TABLET | Refills: 3 | Status: SHIPPED | OUTPATIENT
Start: 2022-07-20 | End: 2022-08-24 | Stop reason: SDUPTHER

## 2022-07-20 RX ORDER — METOPROLOL SUCCINATE 50 MG/1
50 TABLET, EXTENDED RELEASE ORAL
Qty: 90 TABLET | Refills: 3 | Status: SHIPPED | OUTPATIENT
Start: 2022-07-20 | End: 2022-07-20 | Stop reason: SDUPTHER

## 2022-07-20 NOTE — PROGRESS NOTES
Please let patient know that his labs look great. His kidney function and electrolytes are normal and we do not need to make any medication changes! Thanks!

## 2022-07-20 NOTE — PROGRESS NOTES
[facility] Follow Up Office Note     Encounter Date:22  Patient:Bogdan Jeter  :1975  MRN:[id number]      Chief Complaint:   Chief Complaint   Patient presents with   • Follow-up       History of Presenting Illness:        Bogdan Jeter is a 46 y.o. male who is here for follow-up of cardiomyopathy.  He is a patient of Dr Villagomez.    Patient has known alcohol dependence, cardiomyopathy likely non-ischemic, systolic heart failure, DDD, cystic facial lesions.    Patient was admitted 22 with complaint of back pain. During admission required transfer to ICU for severe alcohol withdrawal. MRI showed DDD and orthopedics recommended non-operative treatment.  He was seen by cardiology during this admission for sinus tachycardia in 140s, initially attributed to alcohol withdrawal, however persisted. Patient echo demonstrated LVEF of 18%. He was started on GDMT with metoprolol and ramapril.  He has not had an ischemic evaluation although it is felt most likely to be related to alcohol use based on history.  Patient was discharged to rehab on 22.    Patient reports he is overall doing well since hospital discharge.  His largest issue is he cannot put weight on his left leg and uses a walker, cane or wheelchair to get around.  He is still at [facility] where he was discharged to.  He reports that sometimes his heart rate gets up to the 120s without activity.  He does not feel this but has checked his pulse.  He reports that he is no longer an every day drinker, he has had occasional drink since discharge.  Patient denies chest pain, shortness of breath, lower extremity edema, orthopnea, dizziness.  EKG today shows sinus rhythm with heart rate in the 90s.    Review of Systems:  Review of Systems   Cardiovascular: Negative for chest pain, dyspnea on exertion, leg swelling, orthopnea and palpitations.   Respiratory: Negative for shortness of breath.    Musculoskeletal: Positive for  back pain.       Current Outpatient Medications on File Prior to Visit   Medication Sig Dispense Refill   • folic acid (FOLVITE) 1 MG tablet Take 1 tablet by mouth Daily.     • lidocaine (LIDODERM) 5 % Place 2 patches on the skin as directed by provider Daily. Remove & Discard patch within 12 hours or as directed by MD     • nicotine (NICODERM CQ) 21 MG/24HR patch Place 1 patch on the skin as directed by provider Daily.     • ramipril (ALTACE) 1.25 MG capsule Take 1 capsule by mouth Daily.     • thiamine (VITAMIN B-1) 100 MG tablet  tablet Take 1 tablet by mouth Daily.     • [DISCONTINUED] metoprolol succinate XL (TOPROL-XL) 25 MG 24 hr tablet Take 1 tablet by mouth Daily.     • gabapentin (NEURONTIN) 100 MG capsule Take 1 capsule by mouth Every 12 (Twelve) Hours for 3 days. 6 capsule 0     No current facility-administered medications on file prior to visit.       No Known Allergies    History reviewed. No pertinent past medical history.    History reviewed. No pertinent surgical history.    Social History     Socioeconomic History   • Marital status: Single   Tobacco Use   • Smoking status: Current Every Day Smoker     Packs/day: 1.00     Years: 15.00     Pack years: 15.00     Types: Cigarettes   Vaping Use   • Vaping Use: Never used   Substance and Sexual Activity   • Alcohol use: Not Currently     Alcohol/week: 5.0 standard drinks     Types: 5 Cans of beer per week   • Drug use: Not Currently     Types: Marijuana   • Sexual activity: Defer       History reviewed. No pertinent family history.    The following portions of the patient's history were reviewed and updated as appropriate: allergies, current medications, past family history, past medical history, past social history, past surgical history and problem list.       Objective:       Vitals:    07/20/22 0925   BP: 130/65   BP Location: Right arm   Patient Position: Sitting   Cuff Size: Adult   Pulse: 97   SpO2: 97%   Weight: 75.3 kg (166 lb)   Height: 180.3  "cm (71\")         Physical Exam:  Constitutional: Well appearing, well developed, no acute distress   HENT: Oropharynx clear and membrane moist  Eyes: Normal conjunctiva, no sclera icterus  Neck: Supple, no carotid bruit bilaterally  Cardiovascular: Regular rate and rhythm, No Murmur, No bilateral lower extremity edema  Pulmonary: Normal respiratory effort, normal lung sounds, no wheezing  Neurological: Alert and orient x 3  Skin: Warm, dry, no ecchymosis, no rash  Psych: Appropriate mood and affect. Normal judgment and insight         Lab Results   Component Value Date     (L) 05/20/2022     (L) 05/19/2022    K 4.9 05/20/2022    K 5.0 05/19/2022    CL 97 (L) 05/20/2022    CL 95 (L) 05/19/2022    CO2 26.0 05/20/2022    CO2 23.0 05/19/2022    BUN 12 05/20/2022    BUN 9 05/19/2022    CREATININE 0.75 (L) 05/20/2022    CREATININE 0.66 (L) 05/19/2022    GLUCOSE 95 05/20/2022    GLUCOSE 98 05/19/2022    CALCIUM 9.5 05/20/2022    CALCIUM 9.8 05/19/2022    ALBUMIN 3.70 05/18/2022    ALBUMIN 3.00 (L) 05/13/2022    BILITOT 0.3 05/18/2022    BILITOT 1.0 05/13/2022    AST 35 05/18/2022    AST 28 05/13/2022    ALT 30 05/18/2022    ALT 23 05/13/2022     Lab Results   Component Value Date    WBC 8.60 05/19/2022    WBC 6.80 05/18/2022    HGB 14.3 05/19/2022    HGB 14.7 05/18/2022    HCT 40.8 05/19/2022    HCT 44.4 05/18/2022    MCV 96.2 05/19/2022    .6 (H) 05/18/2022     05/19/2022     05/18/2022     Lab Results   Component Value Date    TRIG 53 09/13/2018    HDL 52 09/13/2018     (H) 09/13/2018     No results found for: PROBNP, BNP  No results found for: CKTOTAL, CKMB, CKMBINDEX, TROPONINI, TROPONINT  Lab Results   Component Value Date    TSH 2.960 05/11/2022           ECG 12 Lead    Date/Time: 7/20/2022 9:27 AM  Performed by: Azul Aldana APRN  Authorized by: Azul Aldana APRN   Comparison: compared with previous ECG from 5/19/2022  Similar to previous ECG  Rhythm: sinus " rhythm  Rate: normal  QRS axis: normal  Other findings: non-specific ST-T wave changes               Assessment:          Diagnosis Plan   1. Sinus tachycardia     2. Chronic systolic heart failure (CMS/HCC)  ECG 12 Lead    Basic Metabolic Panel   3. Alcohol dependence with other alcohol-induced disorder (HCC)     4. Cardiomyopathy, unspecified type (HCC)  Stress Test With Myocardial Perfusion One Day          Plan:       Chronic systolic heart failure / Presumed NICM - felt likely due to alcohol dependence however recent diagnosis and has not had ischemic evaluation.  Ordered nuclear stress test.  Patient's not able to walk on a treadmill due to orthopedic issues. GDMT- ramipril and metoprolol succinate.  Increase metoprolol succinate to 50 mg.  Once heart rate is better controlled can uptitrate ACE. Needs labs today    Sinus tachycardia - in setting of heart failure, alcohol withdrawal. On metoprolol succinate, HR 90s but has been as high as 120s at home.  Increasing metoprolol    Alcohol dependence - per patient no longer drinking daily but does still have occasional drinks    Recently admitted for back pain and noted to have tachycardia with echo revealing severely reduced EF. This is likely related to alcohol use, however will order stress test as ischemic evaluation. Continue GDMT, increase metoprolol to succinate for tachycardia.  Check labs today. Follow-up with Dr. Villagomez in one month or earlier with problems.        Orders Placed This Encounter   Procedures   • Basic Metabolic Panel     Standing Status:   Future     Standing Expiration Date:   7/20/2023     Order Specific Question:   Release to patient     Answer:   Immediate   • Stress Test With Myocardial Perfusion One Day     Standing Status:   Future     Standing Expiration Date:   7/20/2023     Order Specific Question:   What stress agent will be used?     Answer:   Regadenoson (Lexiscan)     Order Specific Question:   Difficulty walking criteria?      Answer:   Musculoskeletal (hips, knees, feet, back, amputee)     Order Specific Question:   Reason for exam?     Answer:   Heart Failure     Order Specific Question:   Release to patient     Answer:   Immediate   • ECG 12 Lead     This order was created via procedure documentation     Order Specific Question:   Release to patient     Answer:   Immediate            JESSE Chappell  [facility]  07/20/22  09:56 EDT

## 2022-07-27 ENCOUNTER — HOSPITAL ENCOUNTER (OUTPATIENT)
Dept: CARDIOLOGY | Facility: HOSPITAL | Age: 47
Discharge: HOME | End: 2022-07-27
Admitting: NURSE PRACTITIONER

## 2022-07-27 VITALS — HEIGHT: 71 IN | BODY MASS INDEX: 23.46 KG/M2 | WEIGHT: 167.55 LBS

## 2022-07-27 DIAGNOSIS — I42.9 CARDIOMYOPATHY, UNSPECIFIED TYPE: ICD-10-CM

## 2022-07-27 LAB
BH CV NUCLEAR PRIOR STUDY: 2
BH CV REST NUCLEAR ISOTOPE DOSE: 10.6 MCI
BH CV STRESS BP STAGE 1: NORMAL
BH CV STRESS COMMENTS STAGE 1: NORMAL
BH CV STRESS DOSE REGADENOSON STAGE 1: 0.4
BH CV STRESS DURATION MIN STAGE 1: 0
BH CV STRESS DURATION SEC STAGE 1: 10
BH CV STRESS HR STAGE 1: 125
BH CV STRESS NUCLEAR ISOTOPE DOSE: 33.5 MCI
BH CV STRESS PROTOCOL 1: NORMAL
BH CV STRESS RECOVERY BP: NORMAL MMHG
BH CV STRESS RECOVERY HR: 103 BPM
BH CV STRESS STAGE 1: 1
LV EF NUC BP: 49 %
MAXIMAL PREDICTED HEART RATE: 173 BPM
PERCENT MAX PREDICTED HR: 72.25 %
STRESS BASELINE BP: NORMAL MMHG
STRESS BASELINE HR: 81 BPM
STRESS PERCENT HR: 85 %
STRESS POST EXERCISE DUR SEC: 10 SEC
STRESS POST PEAK BP: NORMAL MMHG
STRESS POST PEAK HR: 125 BPM
STRESS TARGET HR: 147 BPM

## 2022-07-27 PROCEDURE — 0 TECHNETIUM TETROFOSMIN KIT: Performed by: NURSE PRACTITIONER

## 2022-07-27 PROCEDURE — 78452 HT MUSCLE IMAGE SPECT MULT: CPT | Performed by: INTERNAL MEDICINE

## 2022-07-27 PROCEDURE — 93017 CV STRESS TEST TRACING ONLY: CPT

## 2022-07-27 PROCEDURE — 93018 CV STRESS TEST I&R ONLY: CPT | Performed by: INTERNAL MEDICINE

## 2022-07-27 PROCEDURE — A9502 TC99M TETROFOSMIN: HCPCS | Performed by: NURSE PRACTITIONER

## 2022-07-27 PROCEDURE — 93016 CV STRESS TEST SUPVJ ONLY: CPT | Performed by: INTERNAL MEDICINE

## 2022-07-27 PROCEDURE — 78452 HT MUSCLE IMAGE SPECT MULT: CPT

## 2022-07-27 PROCEDURE — 25010000002 REGADENOSON 0.4 MG/5ML SOLUTION: Performed by: NURSE PRACTITIONER

## 2022-07-27 RX ADMIN — REGADENOSON 0.4 MG: 0.08 INJECTION, SOLUTION INTRAVENOUS at 12:55

## 2022-07-27 RX ADMIN — TETROFOSMIN 1 DOSE: 1.38 INJECTION, POWDER, LYOPHILIZED, FOR SOLUTION INTRAVENOUS at 12:55

## 2022-07-27 RX ADMIN — TETROFOSMIN 1 DOSE: 1.38 INJECTION, POWDER, LYOPHILIZED, FOR SOLUTION INTRAVENOUS at 11:56

## 2022-08-24 ENCOUNTER — OFFICE VISIT (OUTPATIENT)
Dept: CARDIOLOGY | Facility: CLINIC | Age: 47
End: 2022-08-24

## 2022-08-24 VITALS
HEIGHT: 70 IN | HEART RATE: 92 BPM | RESPIRATION RATE: 18 BRPM | WEIGHT: 175 LBS | SYSTOLIC BLOOD PRESSURE: 132 MMHG | OXYGEN SATURATION: 94 % | DIASTOLIC BLOOD PRESSURE: 70 MMHG | BODY MASS INDEX: 25.05 KG/M2

## 2022-08-24 DIAGNOSIS — F10.288 ALCOHOL DEPENDENCE WITH OTHER ALCOHOL-INDUCED DISORDER: ICD-10-CM

## 2022-08-24 DIAGNOSIS — Z72.0 TOBACCO ABUSE: ICD-10-CM

## 2022-08-24 DIAGNOSIS — I50.22 CHRONIC SYSTOLIC HEART FAILURE: Primary | ICD-10-CM

## 2022-08-24 DIAGNOSIS — R00.0 SINUS TACHYCARDIA: ICD-10-CM

## 2022-08-24 PROCEDURE — 99214 OFFICE O/P EST MOD 30 MIN: CPT | Performed by: INTERNAL MEDICINE

## 2022-08-24 PROCEDURE — 93000 ELECTROCARDIOGRAM COMPLETE: CPT | Performed by: INTERNAL MEDICINE

## 2022-08-24 RX ORDER — RAMIPRIL 1.25 MG/1
1.25 CAPSULE ORAL
Qty: 90 CAPSULE | Refills: 3
Start: 2022-08-24 | End: 2022-08-24 | Stop reason: SDUPTHER

## 2022-08-24 RX ORDER — RAMIPRIL 1.25 MG/1
1.25 CAPSULE ORAL
Qty: 90 CAPSULE | Refills: 3 | Status: SHIPPED | OUTPATIENT
Start: 2022-08-24 | End: 2022-08-24 | Stop reason: SDUPTHER

## 2022-08-24 RX ORDER — METOPROLOL SUCCINATE 50 MG/1
50 TABLET, EXTENDED RELEASE ORAL
Qty: 90 TABLET | Refills: 3 | Status: ON HOLD | OUTPATIENT
Start: 2022-08-24 | End: 2022-11-12 | Stop reason: SDUPTHER

## 2022-08-24 RX ORDER — RAMIPRIL 1.25 MG/1
1.25 CAPSULE ORAL
Qty: 90 CAPSULE | Refills: 3 | Status: SHIPPED | OUTPATIENT
Start: 2022-08-24 | End: 2022-11-12 | Stop reason: HOSPADM

## 2022-08-24 RX ORDER — METOPROLOL SUCCINATE 50 MG/1
50 TABLET, EXTENDED RELEASE ORAL
Qty: 90 TABLET | Refills: 3 | Status: SHIPPED | OUTPATIENT
Start: 2022-08-24 | End: 2022-08-24 | Stop reason: SDUPTHER

## 2022-08-24 NOTE — PROGRESS NOTES
"      CARDIOLOGY    Sharona Villagomez MD    ENCOUNTER DATE:  08/24/2022    Bogdan Jeter / 46 y.o. / male        CHIEF COMPLAINT / REASON FOR OFFICE VISIT     Heart Problem (1 Month follow up -- Sinus Tachycardia )      HISTORY OF PRESENT ILLNESS       HPI    Bogdan Jeter is a 46 y.o. male     This gentleman was admitted to the hospital in 05/2022.  I was consulted for sinus tachycardia associated with alcohol withdrawal.  He had a normal TSH and was not anemic.  He had an echocardiogram at that time, which showed his ejection fraction was like 18%. He was euvolemic on exam.  I started him on metoprolol succinate and ramipril.  He followed up in the office with JESSE Flanagan, in 07/2022.  At that time, he seemed to be doing well.  He had a stress test in 07/2022, which put his ejection fraction at 49% and no evidence of ischemia.      He comes in today and says that he is overall been feeling well.  He denies shortness of breath, edema or chest pain.  He has an occasional palpitation sensation.    REVIEW OF SYSTEMS     Review of Systems   Constitutional: Negative for chills, fever, weight gain and weight loss.   Cardiovascular: Negative for leg swelling.   Respiratory: Negative for cough, snoring and wheezing.    Hematologic/Lymphatic: Negative for bleeding problem. Does not bruise/bleed easily.   Skin: Negative for color change.   Musculoskeletal: Negative for falls, joint pain and myalgias.   Gastrointestinal: Negative for melena.   Genitourinary: Negative for hematuria.   Neurological: Negative for excessive daytime sleepiness.   Psychiatric/Behavioral: Negative for depression. The patient is not nervous/anxious.          VITAL SIGNS     Visit Vitals  /70 (BP Location: Left arm, Patient Position: Sitting, Cuff Size: Adult)   Pulse 92   Resp 18   Ht 177.8 cm (70\")   Wt 79.4 kg (175 lb)   SpO2 94%   BMI 25.11 kg/m²         Wt Readings from Last 3 Encounters:   08/24/22 79.4 kg (175 lb)   07/27/22 76 " kg (167 lb 8.8 oz)   07/20/22 75.3 kg (166 lb)     Body mass index is 25.11 kg/m².      PHYSICAL EXAMINATION     Constitutional:       General: Not in acute distress.  Neck:      Vascular: No carotid bruit or JVD.   Pulmonary:      Effort: Pulmonary effort is normal.      Breath sounds: Normal breath sounds.   Cardiovascular:      Normal rate. Regular rhythm.      Murmurs: There is no murmur.   Psychiatric:         Mood and Affect: Mood and affect normal.           REVIEWED DATA       ECG 12 Lead    Date/Time: 8/24/2022 11:23 AM  Performed by: Sharona Villagomez MD  Authorized by: Sharona Villagomez MD   Comparison: compared with previous ECG from 7/20/2022  Similar to previous ECG  Rhythm: sinus rhythm  BPM: 92  Conduction: conduction normal  ST Segments: ST segments normal  T Waves: T waves normal    Clinical impression: normal ECG                  Lab Results   Component Value Date    GLUCOSE 86 07/20/2022    BUN 13 07/20/2022    CREATININE 0.69 (L) 07/20/2022    BCR 18.8 07/20/2022    K 4.4 07/20/2022    CO2 27.3 07/20/2022    CALCIUM 9.9 07/20/2022    ALBUMIN 3.70 05/18/2022    LABIL2 1.4 09/13/2018    AST 35 05/18/2022    ALT 30 05/18/2022       ASSESSMENT & PLAN      Diagnosis Plan   1. Chronic systolic heart failure (CMS/HCC)  Adult Transthoracic Echo Complete W/ Cont if Necessary Per Protocol   2. Alcohol dependence with other alcohol-induced disorder (HCC)  Adult Transthoracic Echo Complete W/ Cont if Necessary Per Protocol   3. Sinus tachycardia  Adult Transthoracic Echo Complete W/ Cont if Necessary Per Protocol   4. Tobacco abuse  Adult Transthoracic Echo Complete W/ Cont if Necessary Per Protocol       1.  Cardiomyopathy.  Ejection fraction 18% in May 2022 by echo.  Ejection fraction of 49% by nuclear stress test in July 2022.  No evidence ischemia.  Presumed to be a nonischemic cardiomyopathy likely associated with alcoholism.  Recheck an echocardiogram since he has been on medical therapy for 3  months.  2.  Sinus tachycardia.  3.  Alcohol abuse.  4.  Tobacco use.  He is cutting back and is smoking less than a pack of cigarettes a day.    One of my nurses or I will go over the results of his echo when it is available.  Continue current medications.  Printed his prescriptions for him today to make sure he is getting accurate medications at the facility where he is staying.  He will follow-up in 3 months.    Orders Placed This Encounter   Procedures   • ECG 12 Lead     This order was created via procedure documentation     Order Specific Question:   Release to patient     Answer:   Routine Release   • Adult Transthoracic Echo Complete W/ Cont if Necessary Per Protocol     Standing Status:   Future     Standing Expiration Date:   8/24/2023     Order Specific Question:   Reason for exam?     Answer:   Heart Failure, Cardiomyopathy, or Sytemic or Pulmonary Hypertension           MEDICATIONS         Discharge Medications          Accurate as of August 24, 2022 11:23 AM. If you have any questions, ask your nurse or doctor.            Continue These Medications      Instructions Start Date   folic acid 1 MG tablet  Commonly known as: FOLVITE   1 mg, Oral, Daily      gabapentin 100 MG capsule  Commonly known as: NEURONTIN   100 mg, Oral, Every 12 Hours Scheduled      lidocaine 5 %  Commonly known as: LIDODERM   2 patches, Transdermal, Every 24 Hours Scheduled, Remove & Discard patch within 12 hours or as directed by MD      metoprolol succinate XL 50 MG 24 hr tablet  Commonly known as: TOPROL-XL   50 mg, Oral, Every 24 Hours Scheduled      nicotine 21 MG/24HR patch  Commonly known as: NICODERM CQ   1 patch, Transdermal, Every 24 Hours Scheduled      ramipril 1.25 MG capsule  Commonly known as: ALTACE   1.25 mg, Oral, Every 24 Hours Scheduled      thiamine 100 MG tablet  tablet  Commonly known as: VITAMIN B-1   100 mg, Oral, Daily               Sharona Villagomez MD  08/24/22  11:23 EDT    **Lee Ann Disclaimer:   Much  of this encounter note is an electronic transcription/translation of spoken language to printed text. The electronic translation of spoken language may permit erroneous, or at times, nonsensical words or phrases to be inadvertently transcribed. Although I have reviewed the note for such errors, some may still exist.

## 2022-09-29 NOTE — NURSING NOTE
" Access follow up due to alcohol use. Per primary RN, dilaudid recently d/c'd, patient appears to seek pain medication frequently. He wakes up when clinicians enter room and immediately asks for it. Upon entering room, patient is resting in bed. When asked how he is doing, he says, \"I'm waiting for my pain medication for my leg.\" When asked about his recovery discharge plan for sobriety, he states \"I'm not sure yet, I gotta see.\" Patient has no questions at this time. He last received Ativan yesterday at 2030, CIWAs 2 or less today. Access will continue to follow.  " Brittany Jonatan  1982       Date of Initial Treatment Plan: 9/29/2022  Date of Current Treatment Plan: 09/29/22    Treatment Plan Number 1    Strengths/Personal Resources for Self Care: going to the beach, concerts and out to eat    Diagnosis:   1  Moderate anxiety     2  Moderate major depression, single episode ([address] )         Area of Needs: learn coping skills to manage anxiety and depression symptoms      Long Term Goal 1: Steve Sanchez will decrease the amount of times she cries in a weekly basis by implementing coping skills to manage her depression  Target Date: 3/28/2023  Completion Date: TBD         Short Term Objectives for Goal 1:      A: Steve Sanchez will identify the triggers to her stress   B: Steve Sanchez will learn coping skills to mange her depression   C: Steve Sanchez will process utilizing CBT to identify the things that cause stress and depression    Long Term Goal 2: Steve Sanchez will decrease her negative, racing thoughts weekly in order to implement coping skills to manage her anxiety  Target Date: 3/28/2023  Completion Date: TBD    Short Term Objectives for Goal 2:      A: Steve Sanchez will express her triggers to her anxiety   B: Steve Sanchez will identify her negative thoughts   C: Steve Sanchez will utilize thought stopping techniques to replace her negative thoughts   D: Steve Sanchez will learn new coping skills to minimize her anxious symptoms    GOAL 1: Modality: Individual 4x per month   Completion Date TBD and The person(s) responsible for carrying out the plan is  [facility]    GOAL 2: Modality: Individual 4x per month   Completion Date TBD and The person(s) responsible for carrying out the plan is  [facility]: Diagnosis and Treatment Plan explained to Payton Mariee relates understanding diagnosis and is agreeable to Treatment Plan         Client Comments : Please share your thoughts, feelings, need and/or experiences regarding your treatment plan: Glennherberth Daniel would like to decrease her anxiety and depression symptoms

## 2022-11-09 ENCOUNTER — APPOINTMENT (OUTPATIENT)
Dept: GENERAL RADIOLOGY | Facility: HOSPITAL | Age: 47
End: 2022-11-09

## 2022-11-09 ENCOUNTER — HOSPITAL ENCOUNTER (OUTPATIENT)
Facility: HOSPITAL | Age: 47
Setting detail: OBSERVATION
Discharge: HOME | End: 2022-11-12
Attending: EMERGENCY MEDICINE | Admitting: STUDENT IN AN ORGANIZED HEALTH CARE EDUCATION/TRAINING PROGRAM

## 2022-11-09 DIAGNOSIS — Q27.9 VASCULAR MALFORMATION: ICD-10-CM

## 2022-11-09 DIAGNOSIS — R73.9 HYPERGLYCEMIA: ICD-10-CM

## 2022-11-09 DIAGNOSIS — R07.9 ACUTE CHEST PAIN: Primary | ICD-10-CM

## 2022-11-09 DIAGNOSIS — R77.8 ELEVATED TROPONIN: ICD-10-CM

## 2022-11-09 DIAGNOSIS — I10 HYPERTENSION, UNSPECIFIED TYPE: ICD-10-CM

## 2022-11-09 DIAGNOSIS — M54.9 INTRACTABLE BACK PAIN: ICD-10-CM

## 2022-11-09 PROBLEM — F10.930 ALCOHOL WITHDRAWAL SYNDROME WITHOUT COMPLICATION: Status: ACTIVE | Noted: 2022-05-09

## 2022-11-09 LAB
ALBUMIN SERPL-MCNC: 3.9 G/DL (ref 3.5–5.2)
ALBUMIN/GLOB SERPL: 1.1 G/DL
ALP SERPL-CCNC: 124 U/L (ref 39–117)
ALT SERPL W P-5'-P-CCNC: 35 U/L (ref 1–41)
ANION GAP SERPL CALCULATED.3IONS-SCNC: 11.4 MMOL/L (ref 5–15)
APTT PPP: 29 SECONDS (ref 22.7–35.4)
AST SERPL-CCNC: 50 U/L (ref 1–40)
BASOPHILS # BLD AUTO: 0.08 10*3/MM3 (ref 0–0.2)
BASOPHILS NFR BLD AUTO: 1.2 % (ref 0–1.5)
BILIRUB SERPL-MCNC: 0.3 MG/DL (ref 0–1.2)
BUN SERPL-MCNC: 6 MG/DL (ref 6–20)
BUN/CREAT SERPL: 8 (ref 7–25)
CALCIUM SPEC-SCNC: 8.9 MG/DL (ref 8.6–10.5)
CHLORIDE SERPL-SCNC: 104 MMOL/L (ref 98–107)
CO2 SERPL-SCNC: 20.6 MMOL/L (ref 22–29)
CREAT SERPL-MCNC: 0.75 MG/DL (ref 0.76–1.27)
DEPRECATED RDW RBC AUTO: 46.2 FL (ref 37–54)
EGFRCR SERPLBLD CKD-EPI 2021: 112 ML/MIN/1.73
EOSINOPHIL # BLD AUTO: 0.1 10*3/MM3 (ref 0–0.4)
EOSINOPHIL NFR BLD AUTO: 1.5 % (ref 0.3–6.2)
ERYTHROCYTE [DISTWIDTH] IN BLOOD BY AUTOMATED COUNT: 13.3 % (ref 12.3–15.4)
GLOBULIN UR ELPH-MCNC: 3.6 GM/DL
GLUCOSE SERPL-MCNC: 124 MG/DL (ref 65–99)
HCT VFR BLD AUTO: 43 % (ref 37.5–51)
HGB BLD-MCNC: 14.8 G/DL (ref 13–17.7)
HOLD SPECIMEN: NORMAL
HOLD SPECIMEN: NORMAL
IMM GRANULOCYTES # BLD AUTO: 0.03 10*3/MM3 (ref 0–0.05)
IMM GRANULOCYTES NFR BLD AUTO: 0.4 % (ref 0–0.5)
INR PPP: 0.86 (ref 0.9–1.1)
LYMPHOCYTES # BLD AUTO: 2.05 10*3/MM3 (ref 0.7–3.1)
LYMPHOCYTES NFR BLD AUTO: 29.9 % (ref 19.6–45.3)
MAGNESIUM SERPL-MCNC: 2.3 MG/DL (ref 1.6–2.6)
MCH RBC QN AUTO: 32.7 PG (ref 26.6–33)
MCHC RBC AUTO-ENTMCNC: 34.4 G/DL (ref 31.5–35.7)
MCV RBC AUTO: 95.1 FL (ref 79–97)
MONOCYTES # BLD AUTO: 0.69 10*3/MM3 (ref 0.1–0.9)
MONOCYTES NFR BLD AUTO: 10.1 % (ref 5–12)
NEUTROPHILS NFR BLD AUTO: 3.9 10*3/MM3 (ref 1.7–7)
NEUTROPHILS NFR BLD AUTO: 56.9 % (ref 42.7–76)
NRBC BLD AUTO-RTO: 0 /100 WBC (ref 0–0.2)
NT-PROBNP SERPL-MCNC: 58.7 PG/ML (ref 0–450)
PLATELET # BLD AUTO: 199 10*3/MM3 (ref 140–450)
PMV BLD AUTO: 9 FL (ref 6–12)
POTASSIUM SERPL-SCNC: 4.3 MMOL/L (ref 3.5–5.2)
PROT SERPL-MCNC: 7.5 G/DL (ref 6–8.5)
PROTHROMBIN TIME: 11.8 SECONDS (ref 11.7–14.2)
QT INTERVAL: 333 MS
RBC # BLD AUTO: 4.52 10*6/MM3 (ref 4.14–5.8)
SODIUM SERPL-SCNC: 136 MMOL/L (ref 136–145)
TROPONIN T SERPL-MCNC: 0.1 NG/ML (ref 0–0.03)
TROPONIN T SERPL-MCNC: 0.11 NG/ML (ref 0–0.03)
WBC NRBC COR # BLD: 6.85 10*3/MM3 (ref 3.4–10.8)
WHOLE BLOOD HOLD COAG: NORMAL
WHOLE BLOOD HOLD SPECIMEN: NORMAL

## 2022-11-09 PROCEDURE — 71046 X-RAY EXAM CHEST 2 VIEWS: CPT

## 2022-11-09 PROCEDURE — 83880 ASSAY OF NATRIURETIC PEPTIDE: CPT | Performed by: NURSE PRACTITIONER

## 2022-11-09 PROCEDURE — 85025 COMPLETE CBC W/AUTO DIFF WBC: CPT | Performed by: EMERGENCY MEDICINE

## 2022-11-09 PROCEDURE — 85730 THROMBOPLASTIN TIME PARTIAL: CPT | Performed by: EMERGENCY MEDICINE

## 2022-11-09 PROCEDURE — 36415 COLL VENOUS BLD VENIPUNCTURE: CPT | Performed by: EMERGENCY MEDICINE

## 2022-11-09 PROCEDURE — 96374 THER/PROPH/DIAG INJ IV PUSH: CPT

## 2022-11-09 PROCEDURE — 85610 PROTHROMBIN TIME: CPT | Performed by: EMERGENCY MEDICINE

## 2022-11-09 PROCEDURE — 93005 ELECTROCARDIOGRAM TRACING: CPT | Performed by: EMERGENCY MEDICINE

## 2022-11-09 PROCEDURE — G0378 HOSPITAL OBSERVATION PER HR: HCPCS

## 2022-11-09 PROCEDURE — 80053 COMPREHEN METABOLIC PANEL: CPT | Performed by: EMERGENCY MEDICINE

## 2022-11-09 PROCEDURE — 93005 ELECTROCARDIOGRAM TRACING: CPT

## 2022-11-09 PROCEDURE — 83735 ASSAY OF MAGNESIUM: CPT | Performed by: EMERGENCY MEDICINE

## 2022-11-09 PROCEDURE — 25010000002 LORAZEPAM PER 2 MG: Performed by: NURSE PRACTITIONER

## 2022-11-09 PROCEDURE — 25010000002 PHENOBARBITAL PER 120 MG: Performed by: NURSE PRACTITIONER

## 2022-11-09 PROCEDURE — 84484 ASSAY OF TROPONIN QUANT: CPT | Performed by: EMERGENCY MEDICINE

## 2022-11-09 PROCEDURE — 99285 EMERGENCY DEPT VISIT HI MDM: CPT

## 2022-11-09 PROCEDURE — 93010 ELECTROCARDIOGRAM REPORT: CPT | Performed by: INTERNAL MEDICINE

## 2022-11-09 RX ORDER — GABAPENTIN 100 MG/1
100 CAPSULE ORAL EVERY 12 HOURS SCHEDULED
Status: DISCONTINUED | OUTPATIENT
Start: 2022-11-09 | End: 2022-11-12 | Stop reason: HOSPADM

## 2022-11-09 RX ORDER — LORAZEPAM 2 MG/ML
1 INJECTION INTRAMUSCULAR
Status: DISCONTINUED | OUTPATIENT
Start: 2022-11-09 | End: 2022-11-12 | Stop reason: HOSPADM

## 2022-11-09 RX ORDER — LORAZEPAM 2 MG/ML
2 INJECTION INTRAMUSCULAR
Status: DISCONTINUED | OUTPATIENT
Start: 2022-11-09 | End: 2022-11-12 | Stop reason: HOSPADM

## 2022-11-09 RX ORDER — LISINOPRIL 5 MG/1
5 TABLET ORAL
Status: DISCONTINUED | OUTPATIENT
Start: 2022-11-09 | End: 2022-11-10

## 2022-11-09 RX ORDER — NITROGLYCERIN 0.4 MG/1
0.4 TABLET SUBLINGUAL
Status: COMPLETED | OUTPATIENT
Start: 2022-11-09 | End: 2022-11-09

## 2022-11-09 RX ORDER — LORAZEPAM 1 MG/1
2 TABLET ORAL
Status: DISCONTINUED | OUTPATIENT
Start: 2022-11-09 | End: 2022-11-12 | Stop reason: HOSPADM

## 2022-11-09 RX ORDER — SODIUM CHLORIDE 0.9 % (FLUSH) 0.9 %
10 SYRINGE (ML) INJECTION EVERY 12 HOURS SCHEDULED
Status: DISCONTINUED | OUTPATIENT
Start: 2022-11-09 | End: 2022-11-12 | Stop reason: HOSPADM

## 2022-11-09 RX ORDER — SODIUM CHLORIDE 0.9 % (FLUSH) 0.9 %
10 SYRINGE (ML) INJECTION AS NEEDED
Status: DISCONTINUED | OUTPATIENT
Start: 2022-11-09 | End: 2022-11-12 | Stop reason: HOSPADM

## 2022-11-09 RX ORDER — ACETAMINOPHEN 500 MG
1000 TABLET ORAL 3 TIMES DAILY
Status: DISCONTINUED | OUTPATIENT
Start: 2022-11-09 | End: 2022-11-09

## 2022-11-09 RX ORDER — ONDANSETRON 4 MG/1
4 TABLET, FILM COATED ORAL EVERY 6 HOURS PRN
Status: DISCONTINUED | OUTPATIENT
Start: 2022-11-09 | End: 2022-11-12 | Stop reason: HOSPADM

## 2022-11-09 RX ORDER — FOLIC ACID 1 MG/1
1 TABLET ORAL DAILY
Status: DISCONTINUED | OUTPATIENT
Start: 2022-11-09 | End: 2022-11-12 | Stop reason: HOSPADM

## 2022-11-09 RX ORDER — ASPIRIN 81 MG/1
324 TABLET, CHEWABLE ORAL ONCE
Status: COMPLETED | OUTPATIENT
Start: 2022-11-09 | End: 2022-11-09

## 2022-11-09 RX ORDER — ATORVASTATIN CALCIUM 80 MG/1
80 TABLET, FILM COATED ORAL NIGHTLY
Status: DISCONTINUED | OUTPATIENT
Start: 2022-11-09 | End: 2022-11-12 | Stop reason: HOSPADM

## 2022-11-09 RX ORDER — ASPIRIN 325 MG
325 TABLET ORAL ONCE
Status: DISCONTINUED | OUTPATIENT
Start: 2022-11-09 | End: 2022-11-09

## 2022-11-09 RX ORDER — ONDANSETRON 2 MG/ML
4 INJECTION INTRAMUSCULAR; INTRAVENOUS EVERY 6 HOURS PRN
Status: DISCONTINUED | OUTPATIENT
Start: 2022-11-09 | End: 2022-11-12 | Stop reason: HOSPADM

## 2022-11-09 RX ORDER — LORAZEPAM 1 MG/1
1 TABLET ORAL
Status: DISCONTINUED | OUTPATIENT
Start: 2022-11-09 | End: 2022-11-12 | Stop reason: HOSPADM

## 2022-11-09 RX ORDER — TRAMADOL HYDROCHLORIDE 50 MG/1
50 TABLET ORAL EVERY 6 HOURS PRN
Status: DISCONTINUED | OUTPATIENT
Start: 2022-11-09 | End: 2022-11-12 | Stop reason: HOSPADM

## 2022-11-09 RX ORDER — DIPHENOXYLATE HYDROCHLORIDE AND ATROPINE SULFATE 2.5; .025 MG/1; MG/1
1 TABLET ORAL DAILY
Status: DISCONTINUED | OUTPATIENT
Start: 2022-11-10 | End: 2022-11-12 | Stop reason: HOSPADM

## 2022-11-09 RX ORDER — FAMOTIDINE 20 MG/1
40 TABLET, FILM COATED ORAL DAILY
Status: DISCONTINUED | OUTPATIENT
Start: 2022-11-09 | End: 2022-11-12 | Stop reason: HOSPADM

## 2022-11-09 RX ORDER — LORAZEPAM 2 MG/ML
2 INJECTION INTRAMUSCULAR ONCE
Status: COMPLETED | OUTPATIENT
Start: 2022-11-09 | End: 2022-11-09

## 2022-11-09 RX ORDER — ASPIRIN 81 MG/1
81 TABLET ORAL DAILY
Status: DISCONTINUED | OUTPATIENT
Start: 2022-11-10 | End: 2022-11-12 | Stop reason: HOSPADM

## 2022-11-09 RX ORDER — AMOXICILLIN 250 MG
2 CAPSULE ORAL 2 TIMES DAILY PRN
Status: DISCONTINUED | OUTPATIENT
Start: 2022-11-09 | End: 2022-11-12 | Stop reason: HOSPADM

## 2022-11-09 RX ORDER — ACETAMINOPHEN 325 MG/1
650 TABLET ORAL EVERY 4 HOURS PRN
Status: DISCONTINUED | OUTPATIENT
Start: 2022-11-09 | End: 2022-11-12 | Stop reason: HOSPADM

## 2022-11-09 RX ORDER — NICOTINE 21 MG/24HR
1 PATCH, TRANSDERMAL 24 HOURS TRANSDERMAL
Status: DISCONTINUED | OUTPATIENT
Start: 2022-11-09 | End: 2022-11-12 | Stop reason: HOSPADM

## 2022-11-09 RX ORDER — METOPROLOL SUCCINATE 50 MG/1
50 TABLET, EXTENDED RELEASE ORAL
Status: DISCONTINUED | OUTPATIENT
Start: 2022-11-09 | End: 2022-11-12 | Stop reason: HOSPADM

## 2022-11-09 RX ADMIN — ASPIRIN 324 MG: 81 TABLET, CHEWABLE ORAL at 13:53

## 2022-11-09 RX ADMIN — ACETAMINOPHEN 1000 MG: 500 TABLET, FILM COATED ORAL at 21:15

## 2022-11-09 RX ADMIN — PHENOBARBITAL SODIUM 226.2 MG: 65 INJECTION INTRAMUSCULAR at 21:15

## 2022-11-09 RX ADMIN — NITROGLYCERIN 0.4 MG: 0.4 TABLET SUBLINGUAL at 13:39

## 2022-11-09 RX ADMIN — NITROGLYCERIN 0.4 MG: 0.4 TABLET SUBLINGUAL at 15:40

## 2022-11-09 RX ADMIN — NITROGLYCERIN 0.4 MG: 0.4 TABLET SUBLINGUAL at 15:45

## 2022-11-09 RX ADMIN — Medication 10 ML: at 21:15

## 2022-11-09 RX ADMIN — GABAPENTIN 100 MG: 100 CAPSULE ORAL at 21:15

## 2022-11-09 RX ADMIN — LORAZEPAM 2 MG: 2 INJECTION INTRAMUSCULAR; INTRAVENOUS at 17:30

## 2022-11-09 RX ADMIN — PHENOBARBITAL SODIUM 301 MG: 65 INJECTION INTRAMUSCULAR at 18:07

## 2022-11-09 NOTE — ED NOTES
".Nursing report ED to floor  Bogdan Jeter  46 y.o.  male    HPI :   Chief Complaint   Patient presents with    Chest Pain       Admitting doctor:   Braxton Lyon MD    Admitting diagnosis:   The primary encounter diagnosis was Acute chest pain. Diagnoses of Elevated troponin, Hypertension, unspecified type, and Hyperglycemia were also pertinent to this visit.    Code status:   Current Code Status       Date Active Code Status Order ID Comments User Context       Prior            Allergies:   Patient has no known allergies.    Isolation:   No active isolations    Intake and Output  No intake or output data in the 24 hours ending 11/09/22 1556    Weight:       11/09/22  1541   Weight: 78.5 kg (173 lb)       Most recent vitals:   Vitals:    11/09/22 1541 11/09/22 1545 11/09/22 1545 11/09/22 1551   BP: 154/95 133/92 133/92 123/79   BP Location: Right arm  Right arm Right arm   Patient Position: Lying  Lying Lying   Pulse: 90 112 113 114   Resp: 18  18 18   Temp:       SpO2: 95%  94% 92%   Weight: 78.5 kg (173 lb)      Height: 180.3 cm (71\")          Active LDAs/IV Access:   Lines, Drains & Airways       Active LDAs       Name Placement date Placement time Site Days    Peripheral IV 11/09/22 1540 Anterior;Left Hand 11/09/22  1540  Hand  less than 1                    Labs (abnormal labs have a star):   Labs Reviewed   COMPREHENSIVE METABOLIC PANEL - Abnormal; Notable for the following components:       Result Value    Glucose 124 (*)     Creatinine 0.75 (*)     CO2 20.6 (*)     AST (SGOT) 50 (*)     Alkaline Phosphatase 124 (*)     All other components within normal limits    Narrative:     GFR Normal >60  Chronic Kidney Disease <60  Kidney Failure <15     TROPONIN (IN-HOUSE) - Abnormal; Notable for the following components:    Troponin T 0.098 (*)     All other components within normal limits    Narrative:     Troponin T Reference Range:  <= 0.03 ng/mL-   Negative for AMI  >0.03 ng/mL-     Abnormal for myocardial " necrosis.  Clinicians would have to utilize clinical acumen, EKG, Troponin and serial changes to determine if it is an Acute Myocardial Infarction or myocardial injury due to an underlying chronic condition.       Results may be falsely decreased if patient taking Biotin.     TROPONIN (IN-HOUSE) - Abnormal; Notable for the following components:    Troponin T 0.112 (*)     All other components within normal limits    Narrative:     Troponin T Reference Range:  <= 0.03 ng/mL-   Negative for AMI  >0.03 ng/mL-     Abnormal for myocardial necrosis.  Clinicians would have to utilize clinical acumen, EKG, Troponin and serial changes to determine if it is an Acute Myocardial Infarction or myocardial injury due to an underlying chronic condition.       Results may be falsely decreased if patient taking Biotin.     PROTIME-INR - Abnormal; Notable for the following components:    INR 0.86 (*)     All other components within normal limits   CBC WITH AUTO DIFFERENTIAL - Normal   APTT - Normal   MAGNESIUM - Normal   RAINBOW DRAW    Narrative:     The following orders were created for panel order [facility] Draw.  Procedure                               Abnormality         Status                     ---------                               -----------         ------                     Green Top (Gel)[[id number]]                                  Final result               Lavender Top[[id number]]                                     Final result               Gold Top - SST[[id number]]                                   Final result               Light Blue Top[[id number]]                                   Final result                 Please view results for these tests on the individual orders.   CBC AND DIFFERENTIAL    Narrative:     The following orders were created for panel order CBC & Differential.  Procedure                               Abnormality         Status                     ---------                                -----------         ------                     CBC Auto Differential[[id number]]        Normal              Final result                 Please view results for these tests on the individual orders.   GREEN TOP   LAVENDER TOP   GOLD TOP - SST   LIGHT BLUE TOP       EKG:   ECG 12 Lead ED Triage Standing Order; Chest Pain   Final Result   HEART RATE= 116  bpm   RR Interval= 517  ms   AR Interval= 138  ms   P Horizontal Axis= 5  deg   P Front Axis= 52  deg   QRSD Interval= 96  ms   QT Interval= 333  ms   QRS Axis= -55  deg   T Wave Axis= 44  deg   - ABNORMAL ECG -   Sinus tachycardia   Left anterior fascicular block   When compared with ECG of 19-May-2022 13:41:30,   No significant change   Electronically Signed By: Jus Lay ([facility]) 09-Nov-2022 13:02:52   Date and Time of Study: 2022-11-09 12:12:20          Meds given in ED:   Medications   sodium chloride 0.9 % flush 10 mL (has no administration in time range)   aspirin chewable tablet 324 mg (324 mg Oral Given 11/9/22 1353)   nitroglycerin (NITROSTAT) SL tablet 0.4 mg (0.4 mg Sublingual Given 11/9/22 1545)       Imaging results:  XR Chest 2 View    Result Date: 11/9/2022  Negative.  This report was finalized on 11/9/2022 1:45 PM by Dr. Kehinde Dudley M.D.       Ambulatory status:   - Up ad josias     Social issues:   Social History     Socioeconomic History    Marital status: Single   Tobacco Use    Smoking status: Every Day     Packs/day: 1.00     Years: 15.00     Pack years: 15.00     Types: Cigarettes    Smokeless tobacco: Never   Vaping Use    Vaping Use: Never used   Substance and Sexual Activity    Alcohol use: Not Currently     Alcohol/week: 5.0 standard drinks     Types: 5 Cans of beer per week    Drug use: Not Currently     Types: Marijuana    Sexual activity: Defer       NIH Stroke Scale:         Ariadna Bay RN  11/09/22 15:56 EST

## 2022-11-09 NOTE — CASE MANAGEMENT/SOCIAL WORK
Discharge Planning Assessment  [facility]     Patient Name: Bogdan Jeter  MRN: [id number]  Today's Date: 11/9/2022    Admit Date: 11/9/2022    Plan: Plans to return home at d/c and will drive self home   Discharge Needs Assessment     Row Name 11/09/22 1511       Living Environment    People in Home sibling(s)    Name(s) of People in Home sister Betsy Cha    Current Living Arrangements apartment    Primary Care Provided by self    Provides Primary Care For no one    Family Caregiver if Needed sibling(s)    Family Caregiver Names Betsy    Quality of Family Relationships supportive    Able to Return to Prior Arrangements yes       Resource/Environmental Concerns    Resource/Environmental Concerns none       Transition Planning    Patient/Family Anticipates Transition to home with family    Patient/Family Anticipated Services at Transition none    Transportation Anticipated car, drives self       Discharge Needs Assessment    Equipment Currently Used at Home none    Concerns to be Addressed no discharge needs identified    Anticipated Changes Related to Illness none    Equipment Needed After Discharge none    Provided Post Acute Provider List? N/A    Provided Post Acute Provider Quality & Resource List? N/A               Discharge Plan     Row Name 11/09/22 1513       Plan    Plan Plans to return home at d/c and will drive self home    Provided Post Acute Provider List? N/A    Provided Post Acute Provider Quality & Resource List? N/A    Plan Comments Introduced self and explained role of CCP. PPE used. Info on facesheet verified. Lives in ap't w/ sister Betsy Cha and plans to return at d/c. Will drive self home. Independent w/ ADLs. No assistive devices used. Denies any d/c needs or difficulty paying for meds-ALFONSO SAINZ, RN              Continued Care and Services - Admitted Since 11/9/2022    Coordination has not been started for this encounter.          Demographic Summary     Row Name 11/09/22 1511        General Information    Admission Type observation    Arrived From emergency department    Referral Source admission list    Reason for Consult discharge planning    Preferred Language English               Functional Status     Row Name 11/09/22 1511       Functional Status    Usual Activity Tolerance good    Current Activity Tolerance good       Functional Status, IADL    Medications independent    Meal Preparation independent    Housekeeping independent    Laundry independent    Shopping independent       Mental Status    General Appearance WDL WDL       Mental Status Summary    Recent Changes in Mental Status/Cognitive Functioning no changes               Psychosocial    No documentation.                Abuse/Neglect    No documentation.                Legal    No documentation.                Substance Abuse    No documentation.                Patient Forms    No documentation.                   Elizabeth Tam RN

## 2022-11-09 NOTE — NURSING NOTE
Pt requested RN call his father, Domenic CONTRERAS'Brien, to inform him of the pt's location and condition. RN was able to make contact with his father and relayed that info.   [phone]

## 2022-11-09 NOTE — H&P
Psychiatric   HISTORY AND PHYSICAL    Patient Name: Bogdan Jeter  : 1975  MRN: [id number]  Primary Care Physician:  Provider, No Known  Date of admission: 2022    Subjective   Subjective     Chief Complaint:   Chief Complaint   Patient presents with   • Chest Pain         HPI:    Bogdan Jeter is a 46 y.o. male with history of chronic systolic heart failure, alcohol dependence and tobacco abuse who presented to [facility] emergency department today complaining of chest pain.  It is described as dull and achy at the bottom of his sternum that started around 9 AM.  Pain is described as constant and nonradiating.  He denies any associated shortness of breath, diaphoresis or GI symptoms.  Pt does endorse drinking greater than 1 pint of alcohol/daily and is high risk for alcohol withdrawal.     Review of Systems   All systems were reviewed and negative except for: chest pain and tremors    Personal History     No past medical history on file.    No past surgical history on file.    Family History: family history is not on file. Otherwise pertinent FHx was reviewed and not pertinent to current issue.    Social History:  reports that he has been smoking cigarettes. He has a 15.00 pack-year smoking history. He has never used smokeless tobacco. He reports that he does not currently use alcohol after a past usage of about 5.0 standard drinks per week. He reports that he does not currently use drugs after having used the following drugs: Marijuana.    Home Medications:  folic acid, gabapentin, lidocaine, metoprolol succinate XL, nicotine, ramipril, and thiamine    Allergies:  No Known Allergies    Objective   Objective     Vitals:   Temp:  [98.8 °F (37.1 °C)] 98.8 °F (37.1 °C)  Heart Rate:  [] 114  Resp:  [18] 18  BP: (123-154)/(79-95) 123/79  Physical Exam    Constitutional: Awake, alert   Eyes: PERRLA, sclerae anicteric, no conjunctival injection   HENT: NCAT, mucous membranes moist   Neck:  Supple, no thyromegaly, no lymphadenopathy, trachea midline   Respiratory: Clear to auscultation bilaterally, nonlabored respirations    Cardiovascular: RRR, no murmurs, rubs, or gallops, palpable pedal pulses bilaterally   Gastrointestinal: Positive bowel sounds, soft, nontender, nondistended   Musculoskeletal: No bilateral ankle edema, no clubbing or cyanosis to extremities   Psychiatric: Appropriate affect, cooperative   Neurologic: Oriented x 3, strength symmetric in all extremities, Cranial Nerves grossly intact to confrontation, speech clear   Skin: No rashes     Result Review    Result Review:  I have personally reviewed the results from the time of this admission to 11/9/2022 16:51 EST and agree with these findings:  [x]  Laboratory list / accordion  []  Microbiology  [x]  Radiology  [x]  EKG/Telemetry   [x]  Cardiology/Vascular   []  Pathology  []  Old records  []  Other:  Most notable findings include: Patient had a stress test dated July 27 of this year.  The LVEF is normal calculated at 49%.  It was a low risk study with no evidence of ischemia.    Assessment & Plan   Assessment / Plan     Brief Patient Summary:  Bogdan Jeter is a 46 y.o. male who presented to ER with chest pain located in sternal area described as pressure.  It has been constant since 9 AM this morning while working.  The chest pain does not radiate.  He denies associated symptoms of diaphoresis, nausea or vomiting and has no shortness of breath.  He does report significant alcohol intake daily and is a smoker.  Denies history of cardiac cath but is established with Dr. Villagomez for some congestive heart failure.    Active Hospital Problems:  Active Hospital Problems    Diagnosis    • **Chest pain      Plan:     Chest pain   -consult Cards  -consider repeat stress test  -trend troponin's  -treat chest pain    Alcohol withdrawal  -phenobarb ordered for 3 doses  -pt to be upgraded to A admission  -closely monitor CIWA every 4  hours    Nicotine withdrawal  -nicotine patch            DVT prophylaxis:  No DVT prophylaxis order currently exists.    CODE STATUS:       Admission Status:  I believe this patient meets observation status.  Due to CIWA 13 and high probablility of alcohol withdrawal, I will admit patient to [facility]. I have discussed with Dr. Arevalo who agrees to admit the patient.     Electronically signed by JESSE Amanda, 11/09/22, 4:51 PM EST.

## 2022-11-09 NOTE — PROGRESS NOTES
Clinical Pharmacy Services: Medication History    Bogdan Jeter is a 46 y.o. male presenting to [facility] for   Chief Complaint   Patient presents with   • Chest Pain       He  has no past medical history on file.    Allergies as of 11/09/2022   • (No Known Allergies)       Medication information was obtained from: Patient   Pharmacy and Phone Number:     Prior to Admission Medications     Prescriptions Last Dose Informant Patient Reported? Taking?    folic acid (FOLVITE) 1 MG tablet More than a month Self No No    Take 1 tablet by mouth Daily.    gabapentin (NEURONTIN) 100 MG capsule More than a month Self No No    Take 1 capsule by mouth Every 12 (Twelve) Hours for 3 days.    lidocaine (LIDODERM) 5 % More than a month Self No No    Place 2 patches on the skin as directed by provider Daily. Remove & Discard patch within 12 hours or as directed by MD    Patient taking differently:  Place 2 patches on the skin as directed by provider Daily.    metoprolol succinate XL (TOPROL-XL) 50 MG 24 hr tablet More than a month Self No No    Take 1 tablet by mouth Daily.    nicotine (NICODERM CQ) 21 MG/24HR patch More than a month Self No No    Place 1 patch on the skin as directed by provider Daily.    ramipril (ALTACE) 1.25 MG capsule More than a month Self No No    Take 1 capsule by mouth Daily.    thiamine (VITAMIN B-1) 100 MG tablet  tablet More than a month Self No No    Take 1 tablet by mouth Daily.            Medication notes: Patient states he was recently at a [facility] in August where he was prescribed medications. Patient states he had left before his discharge and was not able to get his discharge medications because of this.     This medication list is complete to the best of my knowledge as of 11/9/2022    Please call if questions.    Evgeny Ellis  Medication History Technician  [phone]    11/9/2022 16:11 EST    t

## 2022-11-09 NOTE — ED TRIAGE NOTES
Patient to Er via car from home for chest pain x this morning    Patient does report a heart hx    Patient wearing mask this RN in PPE

## 2022-11-09 NOTE — ED PROVIDER NOTES
EMERGENCY DEPARTMENT ENCOUNTER  I wore full protective equipment throughout this patient encounter including a N95 mask, eye shield, gown and gloves. Hand hygiene was performed before donning protective equipment and after removal when leaving the room.    Room Number:  34/34  Date of encounter:  11/9/2022  PCP: Provider, No Known    HPI:  Context: Bogdan Jeter is a 46 y.o. male who presents to the ED c/o chief complaint of chest pain.  Patient complains of dull achy chest pain at the bottom of his sternum that has been constant since approximately 9 AM.  Patient reports pain started up shortly after he arrived at work.  Pain is constant, does not radiate, is not exertional, patient denies any aggravating or ameliorating factors.  No associated shortness of breath, no nausea vomiting, no diaphoresis.  Patient reports that he was at baseline health prior to onset of pain.  Patient reports history of heart failure, denies any history of MI, does report that he has had a stress test in the past, denies any prior cardiac caths.    MEDICAL HISTORY REVIEW  Reviewed in EPIC    PAST MEDICAL HISTORY  Active Ambulatory Problems     Diagnosis Date Noted   • Tobacco abuse 05/09/2022   • Sinus tachycardia 05/17/2022   • Chronic systolic heart failure (CMS/HCC) 05/20/2022   • Alcohol dependence with other alcohol-induced disorder (HCC) 07/20/2022     Resolved Ambulatory Problems     Diagnosis Date Noted   • Intractable back pain 05/09/2022   • Hypokalemia 05/09/2022   • Alcohol withdrawal (HCC) 05/09/2022   • Heart burn 05/09/2022   • Elevated BP without diagnosis of hypertension 05/17/2022     No Additional Past Medical History       PAST SURGICAL HISTORY  No past surgical history on file.    FAMILY HISTORY  No family history on file.    SOCIAL HISTORY  Social History     Socioeconomic History   • Marital status: Single   Tobacco Use   • Smoking status: Every Day     Packs/day: 1.00     Years: 15.00     Pack years: 15.00      Types: Cigarettes   • Smokeless tobacco: Never   Vaping Use   • Vaping Use: Never used   Substance and Sexual Activity   • Alcohol use: Not Currently     Alcohol/week: 5.0 standard drinks     Types: 5 Cans of beer per week   • Drug use: Not Currently     Types: Marijuana   • Sexual activity: Defer       ALLERGIES  Patient has no known allergies.    The patient's allergies have been reviewed    REVIEW OF SYSTEMS  All systems reviewed and negative except for those discussed in HPI.     PHYSICAL EXAM  I have reviewed the triage vital signs and nursing notes.  ED Triage Vitals [11/09/22 1209]   Temp Heart Rate Resp BP SpO2   98.8 °F (37.1 °C) 79 18 -- 97 %      Temp src Heart Rate Source Patient Position BP Location FiO2 (%)   -- -- -- -- --       General: No acute distress.  HENT: NCAT, PERRL, Nares patent.  Eyes: no scleral icterus.  Neck: trachea midline, no ROM limitations.  CV: regular rhythm, regular rate.  Respiratory: normal effort, CTAB.  Abdomen: soft, nondistended, NTTP, no rebound tenderness, no guarding or rigidity.  Musculoskeletal: no deformity.  Neuro: alert, moves all extremities, follows commands.  Skin: warm, dry.    LAB RESULTS  Recent Results (from the past 24 hour(s))   ECG 12 Lead ED Triage Standing Order; Chest Pain    Collection Time: 11/09/22 12:12 PM   Result Value Ref Range    QT Interval 333 ms   Comprehensive Metabolic Panel    Collection Time: 11/09/22 12:23 PM    Specimen: Blood   Result Value Ref Range    Glucose 124 (H) 65 - 99 mg/dL    BUN 6 6 - 20 mg/dL    Creatinine 0.75 (L) 0.76 - 1.27 mg/dL    Sodium 136 136 - 145 mmol/L    Potassium 4.3 3.5 - 5.2 mmol/L    Chloride 104 98 - 107 mmol/L    CO2 20.6 (L) 22.0 - 29.0 mmol/L    Calcium 8.9 8.6 - 10.5 mg/dL    Total Protein 7.5 6.0 - 8.5 g/dL    Albumin 3.90 3.50 - 5.20 g/dL    ALT (SGPT) 35 1 - 41 U/L    AST (SGOT) 50 (H) 1 - 40 U/L    Alkaline Phosphatase 124 (H) 39 - 117 U/L    Total Bilirubin 0.3 0.0 - 1.2 mg/dL    Globulin 3.6 gm/dL     A/G Ratio 1.1 g/dL    BUN/Creatinine Ratio 8.0 7.0 - 25.0    Anion Gap 11.4 5.0 - 15.0 mmol/L    eGFR 112.0 >60.0 mL/min/1.73   Troponin    Collection Time: 11/09/22 12:23 PM    Specimen: Blood   Result Value Ref Range    Troponin T 0.098 (C) 0.000 - 0.030 ng/mL   Green Top (Gel)    Collection Time: 11/09/22 12:23 PM   Result Value Ref Range    Extra Tube Hold for add-ons.    Lavender Top    Collection Time: 11/09/22 12:23 PM   Result Value Ref Range    Extra Tube hold for add-on    Gold Top - SST    Collection Time: 11/09/22 12:23 PM   Result Value Ref Range    Extra Tube Hold for add-ons.    Light Blue Top    Collection Time: 11/09/22 12:23 PM   Result Value Ref Range    Extra Tube Hold for add-ons.    Protime-INR    Collection Time: 11/09/22 12:23 PM    Specimen: Blood   Result Value Ref Range    Protime 11.8 11.7 - 14.2 Seconds    INR 0.86 (L) 0.90 - 1.10   aPTT    Collection Time: 11/09/22 12:23 PM    Specimen: Blood   Result Value Ref Range    PTT 29.0 22.7 - 35.4 seconds   Magnesium    Collection Time: 11/09/22 12:23 PM    Specimen: Blood   Result Value Ref Range    Magnesium 2.3 1.6 - 2.6 mg/dL   Troponin    Collection Time: 11/09/22  1:50 PM    Specimen: Blood   Result Value Ref Range    Troponin T 0.112 (C) 0.000 - 0.030 ng/mL   CBC Auto Differential    Collection Time: 11/09/22  1:50 PM    Specimen: Blood   Result Value Ref Range    WBC 6.85 3.40 - 10.80 10*3/mm3    RBC 4.52 4.14 - 5.80 10*6/mm3    Hemoglobin 14.8 13.0 - 17.7 g/dL    Hematocrit 43.0 37.5 - 51.0 %    MCV 95.1 79.0 - 97.0 fL    MCH 32.7 26.6 - 33.0 pg    MCHC 34.4 31.5 - 35.7 g/dL    RDW 13.3 12.3 - 15.4 %    RDW-SD 46.2 37.0 - 54.0 fl    MPV 9.0 6.0 - 12.0 fL    Platelets 199 140 - 450 10*3/mm3    Neutrophil % 56.9 42.7 - 76.0 %    Lymphocyte % 29.9 19.6 - 45.3 %    Monocyte % 10.1 5.0 - 12.0 %    Eosinophil % 1.5 0.3 - 6.2 %    Basophil % 1.2 0.0 - 1.5 %    Immature Grans % 0.4 0.0 - 0.5 %    Neutrophils, Absolute 3.90 1.70 - 7.00  10*3/mm3    Lymphocytes, Absolute 2.05 0.70 - 3.10 10*3/mm3    Monocytes, Absolute 0.69 0.10 - 0.90 10*3/mm3    Eosinophils, Absolute 0.10 0.00 - 0.40 10*3/mm3    Basophils, Absolute 0.08 0.00 - 0.20 10*3/mm3    Immature Grans, Absolute 0.03 0.00 - 0.05 10*3/mm3    nRBC 0.0 0.0 - 0.2 /100 WBC       I ordered the above labs and reviewed the results.    RADIOLOGY  XR Chest 2 View    Result Date: 11/9/2022  PA AND LATERAL CHEST X-RAY  HISTORY: Chest pain.  Chest x-ray consisting of three images is provided. Correlation: Chest x-ray 05/14/2022.  FINDINGS: The cardiomediastinal silhouette is normal. The lungs are clear. The costophrenic sulci are dry and the bones appear normal. There is no pneumothorax.      Negative.  This report was finalized on 11/9/2022 1:45 PM by Dr. Kehinde Dudley M.D.        I ordered the above noted radiological studies. I reviewed the images and results. I agree with the radiologist interpretation.    PROCEDURES  Procedures    MEDICATIONS GIVEN IN ER  Medications   sodium chloride 0.9 % flush 10 mL (has no administration in time range)   nitroglycerin (NITROSTAT) SL tablet 0.4 mg (0.4 mg Sublingual Given 11/9/22 1339)   aspirin chewable tablet 324 mg (324 mg Oral Given 11/9/22 1353)       PROGRESS, DATA ANALYSIS, CONSULTS, AND MEDICAL DECISION MAKING  A complete history and physical exam have been performed.  All available laboratory and imaging results have been reviewed by myself prior to disposition.    MDM  After the initial H&P, I discussed pertinent information from history and physical exam with patient/family.  Discussed differential diagnosis.  Discussed plan for ED evaluation/workup/treatment.  All questions answered.  Patient/family is agreeable with plan.  ED Course as of 11/09/22 1519   Wed Nov 09, 2022   1314 My differential diagnosis for chest pain includes but is not limited to:  Muscle strain, costochondritis, myositis, pleurisy, rib fracture, intercostal neuritis, herpes  zoster, tumor, myocardial infarction, coronary syndrome, unstable angina, angina, aortic dissection, mitral valve prolapse, pericarditis, palpitations, pulmonary embolus, pneumonia, pneumothorax, lung cancer, GERD, esophagitis, esophageal spasm     [JG]   1315 EKG independently viewed and contemporaneously interpreted by ED physician. Time: 12:12 PM.  Rate 116.  Interpretation: Sinus tachycardia, left axis deviation, left anterior fascicular block, no acute ST changes. [JG]   1322 Troponin elevated 0.098.   [JG]   1443 Repeat troponin 0.112.  Slightly increased from prior although minimally so, this could be secondary to lab sampling error. [JG]   1444 HEART SCORE:    History #0  (Highly suspicious 2, Moderately suspicious 1, Slightly or non-suspicious 0)    ECG #0  (Significant ST depression 2,  Nonspecific repol disturbance 1, Normal 0)    Age #1  (> or = 65 2, 46-65 1,  < or = 45 0)    Risk factors #1  (hypercholesterolemia, HTN, DM, smoking, pos fam hx, obesity)  (> or = to 3 RF 2, 1 or 2 1, No risk factors 0)    Troponin #2  (> or = 3x normal limit 2, 1-3x normal limit 1, < or = Normal limit 0)    HEART Score Key:  Scores 0-3: 0.9-1.7% risk of adverse cardiac event. In the HEART Score study, these patients were discharged (0.99% in the retrospective study, 1.7% in the prospective study)  Scores 4-6: 12-16.6% risk of adverse cardiac event. In the HEART Score study, these patients were admitted to the hospital. (11.6% retrospective, 16.6% prospective)  Scores =7: 50-65% risk of adverse cardiac event. In the HEART Score study, these patients were candidates for early invasive measures. (65.2% retrospective, 50.1% prospective)      This patient's HEART score is 4     [JG]   1448 Patient reassessed.  Patient reports improvement of chest pain after aspirin nitroglycerin, pain is minimal at present, pain currently 2 out of 10.  Discussed ED work-up and results, discussed plan for admission for further evaluation.   Patient agreeable with plan, no questions or concerns. [JG]   2085 Phone call with family, AISHWARYA with [facility].  Discussed the patient, relevant history, exam, diagnostics, ED findings/progress, and concerns. They agree to admit the patient to telemetry observation under Dr. Lyon. Care assumed by the admitting physician at this time.     [JG]      ED Course User Index  [JG] Steven Taylor MD       AS OF 15:19 EST VITALS:    BP - 132/90  HR - 107  TEMP - 98.8 °F (37.1 °C)  O2 SATS - 94%    DIAGNOSIS  Final diagnoses:   Acute chest pain   Elevated troponin   Hypertension, unspecified type   Hyperglycemia         DISPOSITION  ADMISSION    Discussed treatment plan and reason for admission with pt/family and admitting physician.  Pt/family voiced understanding of the plan for admission for further testing/treatment as needed.          Steven Taylor MD  11/09/22 2541

## 2022-11-10 ENCOUNTER — APPOINTMENT (OUTPATIENT)
Dept: CARDIOLOGY | Facility: HOSPITAL | Age: 47
End: 2022-11-10

## 2022-11-10 LAB
ALBUMIN SERPL-MCNC: 3.9 G/DL (ref 3.5–5.2)
ALBUMIN/GLOB SERPL: 1.4 G/DL
ALP SERPL-CCNC: 106 U/L (ref 39–117)
ALT SERPL W P-5'-P-CCNC: 30 U/L (ref 1–41)
ANION GAP SERPL CALCULATED.3IONS-SCNC: 11.6 MMOL/L (ref 5–15)
AORTIC DIMENSIONLESS INDEX: 0.9 (DI)
ASCENDING AORTA: 3.5 CM
AST SERPL-CCNC: 45 U/L (ref 1–40)
BASOPHILS # BLD AUTO: 0.08 10*3/MM3 (ref 0–0.2)
BASOPHILS NFR BLD AUTO: 0.8 % (ref 0–1.5)
BH CV ECHO MEAS - ACS: 1.95 CM
BH CV ECHO MEAS - AO MAX PG: 3.1 MMHG
BH CV ECHO MEAS - AO MEAN PG: 1.6 MMHG
BH CV ECHO MEAS - AO ROOT DIAM: 3.7 CM
BH CV ECHO MEAS - AO V2 MAX: 88.1 CM/SEC
BH CV ECHO MEAS - AO V2 VTI: 14 CM
BH CV ECHO MEAS - AVA(I,D): 4.1 CM2
BH CV ECHO MEAS - EDV(CUBED): 146.9 ML
BH CV ECHO MEAS - EDV(MOD-SP2): 107 ML
BH CV ECHO MEAS - EDV(MOD-SP4): 113 ML
BH CV ECHO MEAS - EF(MOD-BP): 40.6 %
BH CV ECHO MEAS - EF(MOD-SP2): 41.1 %
BH CV ECHO MEAS - EF(MOD-SP4): 38.9 %
BH CV ECHO MEAS - ESV(CUBED): 84.7 ML
BH CV ECHO MEAS - ESV(MOD-SP2): 63 ML
BH CV ECHO MEAS - ESV(MOD-SP4): 69 ML
BH CV ECHO MEAS - FS: 16.8 %
BH CV ECHO MEAS - IVS/LVPW: 0.96 CM
BH CV ECHO MEAS - IVSD: 0.95 CM
BH CV ECHO MEAS - LAT PEAK E' VEL: 6.3 CM/SEC
BH CV ECHO MEAS - LV DIASTOLIC VOL/BSA (35-75): 56.6 CM2
BH CV ECHO MEAS - LV MASS(C)D: 192 GRAMS
BH CV ECHO MEAS - LV MAX PG: 2.15 MMHG
BH CV ECHO MEAS - LV MEAN PG: 0.94 MMHG
BH CV ECHO MEAS - LV SYSTOLIC VOL/BSA (12-30): 34.6 CM2
BH CV ECHO MEAS - LV V1 MAX: 73.3 CM/SEC
BH CV ECHO MEAS - LV V1 VTI: 12.9 CM
BH CV ECHO MEAS - LVIDD: 5.3 CM
BH CV ECHO MEAS - LVIDS: 4.4 CM
BH CV ECHO MEAS - LVOT AREA: 4.5 CM2
BH CV ECHO MEAS - LVOT DIAM: 2.38 CM
BH CV ECHO MEAS - LVPWD: 0.99 CM
BH CV ECHO MEAS - MED PEAK E' VEL: 4.7 CM/SEC
BH CV ECHO MEAS - MV A DUR: 0.14 SEC
BH CV ECHO MEAS - MV A MAX VEL: 73.7 CM/SEC
BH CV ECHO MEAS - MV DEC SLOPE: 266.2 CM/SEC2
BH CV ECHO MEAS - MV DEC TIME: 0.3 MSEC
BH CV ECHO MEAS - MV E MAX VEL: 39.4 CM/SEC
BH CV ECHO MEAS - MV E/A: 0.53
BH CV ECHO MEAS - MV MAX PG: 3.8 MMHG
BH CV ECHO MEAS - MV MEAN PG: 1.3 MMHG
BH CV ECHO MEAS - MV P1/2T: 61.9 MSEC
BH CV ECHO MEAS - MV V2 VTI: 12.6 CM
BH CV ECHO MEAS - MVA(P1/2T): 3.6 CM2
BH CV ECHO MEAS - MVA(VTI): 4.6 CM2
BH CV ECHO MEAS - PA ACC TIME: 0.1 SEC
BH CV ECHO MEAS - PA PR(ACCEL): 36.2 MMHG
BH CV ECHO MEAS - PA V2 MAX: 84.2 CM/SEC
BH CV ECHO MEAS - PULM A REVS DUR: 0.11 SEC
BH CV ECHO MEAS - PULM A REVS VEL: 26.1 CM/SEC
BH CV ECHO MEAS - PULM DIAS VEL: 33.4 CM/SEC
BH CV ECHO MEAS - PULM S/D: 0.87
BH CV ECHO MEAS - PULM SYS VEL: 29.1 CM/SEC
BH CV ECHO MEAS - QP/QS: 1.04
BH CV ECHO MEAS - RV MAX PG: 2.46 MMHG
BH CV ECHO MEAS - RV V1 MAX: 78.4 CM/SEC
BH CV ECHO MEAS - RV V1 VTI: 13.6 CM
BH CV ECHO MEAS - RVOT DIAM: 2.36 CM
BH CV ECHO MEAS - SI(MOD-SP2): 22 ML/M2
BH CV ECHO MEAS - SI(MOD-SP4): 22 ML/M2
BH CV ECHO MEAS - SV(LVOT): 57.4 ML
BH CV ECHO MEAS - SV(MOD-SP2): 44 ML
BH CV ECHO MEAS - SV(MOD-SP4): 44 ML
BH CV ECHO MEAS - SV(RVOT): 59.5 ML
BH CV ECHO MEAS - TAPSE (>1.6): 2.13 CM
BH CV ECHO MEASUREMENTS AVERAGE E/E' RATIO: 7.16
BH CV VAS BP RIGHT ARM: NORMAL MMHG
BH CV XLRA - RV BASE: 3.5 CM
BH CV XLRA - RV LENGTH: 7.6 CM
BH CV XLRA - RV MID: 3.5 CM
BH CV XLRA - TDI S': 13.6 CM/SEC
BILIRUB SERPL-MCNC: 0.9 MG/DL (ref 0–1.2)
BUN SERPL-MCNC: 6 MG/DL (ref 6–20)
BUN/CREAT SERPL: 7.4 (ref 7–25)
CALCIUM SPEC-SCNC: 9.3 MG/DL (ref 8.6–10.5)
CHLORIDE SERPL-SCNC: 101 MMOL/L (ref 98–107)
CHOLEST SERPL-MCNC: 197 MG/DL (ref 0–200)
CO2 SERPL-SCNC: 25.4 MMOL/L (ref 22–29)
CREAT SERPL-MCNC: 0.81 MG/DL (ref 0.76–1.27)
D DIMER PPP FEU-MCNC: <0.27 MCGFEU/ML (ref 0–0.49)
DEPRECATED RDW RBC AUTO: 46 FL (ref 37–54)
EGFRCR SERPLBLD CKD-EPI 2021: 109.4 ML/MIN/1.73
EOSINOPHIL # BLD AUTO: 0.15 10*3/MM3 (ref 0–0.4)
EOSINOPHIL NFR BLD AUTO: 1.5 % (ref 0.3–6.2)
ERYTHROCYTE [DISTWIDTH] IN BLOOD BY AUTOMATED COUNT: 13.2 % (ref 12.3–15.4)
GLOBULIN UR ELPH-MCNC: 2.8 GM/DL
GLUCOSE SERPL-MCNC: 96 MG/DL (ref 65–99)
HBA1C MFR BLD: 5.4 % (ref 4.8–5.6)
HCT VFR BLD AUTO: 43.7 % (ref 37.5–51)
HDLC SERPL-MCNC: 71 MG/DL (ref 40–60)
HGB BLD-MCNC: 15.1 G/DL (ref 13–17.7)
IMM GRANULOCYTES # BLD AUTO: 0.03 10*3/MM3 (ref 0–0.05)
IMM GRANULOCYTES NFR BLD AUTO: 0.3 % (ref 0–0.5)
LDLC SERPL CALC-MCNC: 114 MG/DL (ref 0–100)
LDLC/HDLC SERPL: 1.6 {RATIO}
LEFT ATRIUM VOLUME INDEX: 18.3 ML/M2
LYMPHOCYTES # BLD AUTO: 1.98 10*3/MM3 (ref 0.7–3.1)
LYMPHOCYTES NFR BLD AUTO: 19.1 % (ref 19.6–45.3)
MAGNESIUM SERPL-MCNC: 1.9 MG/DL (ref 1.6–2.6)
MAXIMAL PREDICTED HEART RATE: 173 BPM
MCH RBC QN AUTO: 32.9 PG (ref 26.6–33)
MCHC RBC AUTO-ENTMCNC: 34.6 G/DL (ref 31.5–35.7)
MCV RBC AUTO: 95.2 FL (ref 79–97)
MONOCYTES # BLD AUTO: 0.82 10*3/MM3 (ref 0.1–0.9)
MONOCYTES NFR BLD AUTO: 7.9 % (ref 5–12)
NEUTROPHILS NFR BLD AUTO: 7.28 10*3/MM3 (ref 1.7–7)
NEUTROPHILS NFR BLD AUTO: 70.4 % (ref 42.7–76)
NRBC BLD AUTO-RTO: 0 /100 WBC (ref 0–0.2)
PLATELET # BLD AUTO: 196 10*3/MM3 (ref 140–450)
PMV BLD AUTO: 9 FL (ref 6–12)
POTASSIUM SERPL-SCNC: 4.4 MMOL/L (ref 3.5–5.2)
PROT SERPL-MCNC: 6.7 G/DL (ref 6–8.5)
RBC # BLD AUTO: 4.59 10*6/MM3 (ref 4.14–5.8)
SINUS: 3.3 CM
SODIUM SERPL-SCNC: 138 MMOL/L (ref 136–145)
STJ: 2.9 CM
STRESS TARGET HR: 147 BPM
TRIGL SERPL-MCNC: 63 MG/DL (ref 0–150)
TROPONIN T SERPL-MCNC: 0.14 NG/ML (ref 0–0.03)
VLDLC SERPL-MCNC: 12 MG/DL (ref 5–40)
WBC NRBC COR # BLD: 10.34 10*3/MM3 (ref 3.4–10.8)

## 2022-11-10 PROCEDURE — 85025 COMPLETE CBC W/AUTO DIFF WBC: CPT | Performed by: NURSE PRACTITIONER

## 2022-11-10 PROCEDURE — 80053 COMPREHEN METABOLIC PANEL: CPT | Performed by: NURSE PRACTITIONER

## 2022-11-10 PROCEDURE — 96376 TX/PRO/DX INJ SAME DRUG ADON: CPT

## 2022-11-10 PROCEDURE — 99214 OFFICE O/P EST MOD 30 MIN: CPT | Performed by: INTERNAL MEDICINE

## 2022-11-10 PROCEDURE — 83735 ASSAY OF MAGNESIUM: CPT | Performed by: NURSE PRACTITIONER

## 2022-11-10 PROCEDURE — G0378 HOSPITAL OBSERVATION PER HR: HCPCS

## 2022-11-10 PROCEDURE — 85379 FIBRIN DEGRADATION QUANT: CPT | Performed by: INTERNAL MEDICINE

## 2022-11-10 PROCEDURE — 25010000002 PHENOBARBITAL PER 120 MG: Performed by: NURSE PRACTITIONER

## 2022-11-10 PROCEDURE — 93306 TTE W/DOPPLER COMPLETE: CPT | Performed by: INTERNAL MEDICINE

## 2022-11-10 PROCEDURE — 80061 LIPID PANEL: CPT | Performed by: INTERNAL MEDICINE

## 2022-11-10 PROCEDURE — 83036 HEMOGLOBIN GLYCOSYLATED A1C: CPT | Performed by: INTERNAL MEDICINE

## 2022-11-10 PROCEDURE — 25010000002 LORAZEPAM PER 2 MG: Performed by: INTERNAL MEDICINE

## 2022-11-10 PROCEDURE — 25010000002 PERFLUTREN (DEFINITY) 8.476 MG IN SODIUM CHLORIDE (PF) 0.9 % 10 ML INJECTION: Performed by: INTERNAL MEDICINE

## 2022-11-10 PROCEDURE — 84484 ASSAY OF TROPONIN QUANT: CPT | Performed by: INTERNAL MEDICINE

## 2022-11-10 PROCEDURE — 93306 TTE W/DOPPLER COMPLETE: CPT

## 2022-11-10 RX ORDER — LISINOPRIL 10 MG/1
10 TABLET ORAL
Status: DISCONTINUED | OUTPATIENT
Start: 2022-11-11 | End: 2022-11-12 | Stop reason: HOSPADM

## 2022-11-10 RX ADMIN — LISINOPRIL 5 MG: 5 TABLET ORAL at 08:25

## 2022-11-10 RX ADMIN — Medication 10 ML: at 21:02

## 2022-11-10 RX ADMIN — FAMOTIDINE 40 MG: 20 TABLET ORAL at 08:25

## 2022-11-10 RX ADMIN — METOPROLOL SUCCINATE 50 MG: 50 TABLET, FILM COATED, EXTENDED RELEASE ORAL at 08:25

## 2022-11-10 RX ADMIN — Medication 10 ML: at 08:24

## 2022-11-10 RX ADMIN — PERFLUTREN 1 ML: 6.52 INJECTION, SUSPENSION INTRAVENOUS at 11:06

## 2022-11-10 RX ADMIN — ATORVASTATIN CALCIUM 80 MG: 80 TABLET, FILM COATED ORAL at 21:02

## 2022-11-10 RX ADMIN — FOLIC ACID 1 MG: 1 TABLET ORAL at 08:25

## 2022-11-10 RX ADMIN — PHENOBARBITAL SODIUM 226.2 MG: 65 INJECTION INTRAMUSCULAR at 00:49

## 2022-11-10 RX ADMIN — Medication 100 MG: at 08:25

## 2022-11-10 RX ADMIN — GABAPENTIN 100 MG: 100 CAPSULE ORAL at 21:02

## 2022-11-10 RX ADMIN — ASPIRIN 81 MG: 81 TABLET, COATED ORAL at 08:25

## 2022-11-10 RX ADMIN — ATORVASTATIN CALCIUM 80 MG: 80 TABLET, FILM COATED ORAL at 00:49

## 2022-11-10 RX ADMIN — NICOTINE 1 PATCH: 21 PATCH, EXTENDED RELEASE TRANSDERMAL at 11:58

## 2022-11-10 RX ADMIN — LORAZEPAM 2 MG: 2 INJECTION INTRAMUSCULAR; INTRAVENOUS at 16:19

## 2022-11-10 RX ADMIN — GABAPENTIN 100 MG: 100 CAPSULE ORAL at 08:25

## 2022-11-10 RX ADMIN — Medication 1 TABLET: at 08:25

## 2022-11-10 NOTE — PLAN OF CARE
Goal Outcome Evaluation:         Pt came to ed this shift w cp. 3 ntg sl given and 1 asa per ed. Troponin x2.see results. Pt states no cp upon admittance to obs. Pt states he is daily drinker w 5-6 32 oz alcoholic beverages daily w  half bourbon and half mixer. He states he last had a drink on Monday night/Tuesday am. Pt states he is having color and black dot hallucinations. Pt states he had nausea and diaphoresis earlier this day. 2 mg iv ativan given per md order and phenobarbital 300mg in 100ml given over 10 minutes for seizure /dt/withdrawal  precautions. Side rails padded per seizure precautions. Report given to transferrring rn.

## 2022-11-10 NOTE — PROGRESS NOTES
MD ATTESTATION NOTE    The AISHWARYA and I have discussed this patient's history, physical exam, and treatment plan.  I have reviewed the documentation and personally had a face to face interaction with the patient. I affirm the documentation and agree with the treatment and plan.  The attached note describes my personal findings.      I provided a substantive portion of the care of the patient.  I personally performed the physical exam in its entirety, and below are my findings.  For this patient encounter, the patient wore surgical mask, I wore full protective PPE including N95 and eye protection.      Brief HPI: This patient is a 46-year-old male with a history of congestive heart failure presenting to the emergency room today secondary to chest discomfort.  He states that the pain has been fairly constant but is starting to subside.  He rates it currently at a 2 out of 10.  He denies any associated shortness of breath, diaphoresis, or nausea and vomiting.    PHYSICAL EXAM  ED Triage Vitals   Temp Heart Rate Resp BP SpO2   11/09/22 1209 11/09/22 1209 11/09/22 1209 11/09/22 1339 11/09/22 1209   98.8 °F (37.1 °C) 79 18 138/92 97 %      Temp src Heart Rate Source Patient Position BP Location FiO2 (%)   11/09/22 1700 11/09/22 1541 11/09/22 1541 11/09/22 1541 --   Oral Monitor Lying Right arm          GENERAL: Resting comfortably and in no acute distress, nontoxic in appearance  HENT: nares patent  EYES: no scleral icterus  CV: regular rhythm, normal rate, no M/R/G  RESPIRATORY: normal effort, lungs clear bilaterally  ABDOMEN: soft, nontender, no rebound or guarding  MUSCULOSKELETAL: no deformity, no edema  NEURO: alert, moves all extremities, follows commands  PSYCH:  calm, cooperative  SKIN: warm, dry    Vital signs and nursing notes reviewed.        Plan: The patient's EKG and cardiac enzymes show no acute ischemic changes.  We will monitor and ask cardiology to see in consultation.

## 2022-11-10 NOTE — PROGRESS NOTES
Name: Bogdan Jeter ADMIT: 2022   : 1975  PCP: Provider, No Known    MRN: [id number] LOS: 1 days   AGE/SEX: 46 y.o. male  ROOM: [facility]     Subjective   Subjective   No complaint. Currently no chest pain. Just got back from echocardiogram.    Review of Systems   Constitutional: Negative for fever.   Respiratory: Negative for cough and shortness of breath.    Gastrointestinal: Negative for abdominal pain, diarrhea, nausea and vomiting.   Genitourinary: Negative for dysuria.   Neurological: Negative for headaches.      Objective   Objective   Vital Signs  Temp:  [98.3 °F (36.8 °C)-98.9 °F (37.2 °C)] 98.3 °F (36.8 °C)  Heart Rate:  [] 100  Resp:  [16-20] 16  BP: (123-158)/() 158/91  SpO2:  [92 %-97 %] 92 %  on  Flow (L/min):  [1] 1;   Device (Oxygen Therapy): nasal cannula  Body mass index is 24.69 kg/m².    Physical Exam  Constitutional:       General: He is not in acute distress.     Appearance: Normal appearance. He is not toxic-appearing.   HENT:      Head: Normocephalic and atraumatic.   Cardiovascular:      Rate and Rhythm: Normal rate and regular rhythm.   Pulmonary:      Effort: Pulmonary effort is normal. No respiratory distress.      Breath sounds: Normal breath sounds. No wheezing, rhonchi or rales.   Abdominal:      General: Bowel sounds are normal.      Palpations: Abdomen is soft.      Tenderness: There is no abdominal tenderness. There is no guarding or rebound.   Musculoskeletal:         General: No swelling.      Right lower leg: No edema.      Left lower leg: No edema.   Skin:     General: Skin is warm and dry.   Neurological:      General: No focal deficit present.      Mental Status: He is alert and oriented to person, place, and time.   Psychiatric:         Mood and Affect: Mood normal.         Behavior: Behavior normal.         Thought Content: Thought content normal.     Results Review  I reviewed the patient's new clinical results.  Results from last 7 days   Lab  Units 11/10/22  0548 11/09/22  1350   WBC 10*3/mm3 10.34 6.85   HEMOGLOBIN g/dL 15.1 14.8   PLATELETS 10*3/mm3 196 199     Results from last 7 days   Lab Units 11/10/22  0548 11/09/22  1223   SODIUM mmol/L 138 136   POTASSIUM mmol/L 4.4 4.3   CHLORIDE mmol/L 101 104   CO2 mmol/L 25.4 20.6*   BUN mg/dL 6 6   CREATININE mg/dL 0.81 0.75*   GLUCOSE mg/dL 96 124*     Lab Results   Component Value Date    ANIONGAP 11.6 11/10/2022     Estimated Creatinine Clearance: 127.6 mL/min (by C-G formula based on SCr of 0.81 mg/dL).    Results from last 7 days   Lab Units 11/10/22  0548 11/09/22  1223   ALBUMIN g/dL 3.90 3.90   BILIRUBIN mg/dL 0.9 0.3   ALK PHOS U/L 106 124*   AST (SGOT) U/L 45* 50*   ALT (SGPT) U/L 30 35     Results from last 7 days   Lab Units 11/10/22  0548 11/09/22  1223   CALCIUM mg/dL 9.3 8.9   ALBUMIN g/dL 3.90 3.90   MAGNESIUM mg/dL 1.9 2.3       Hemoglobin A1C   Date/Time Value Ref Range Status   11/10/2022 0548 5.40 4.80 - 5.60 % Final       XR Chest 2 View    Result Date: 11/9/2022  Negative.  This report was finalized on 11/9/2022 1:45 PM by Dr. Kehinde Dudley M.D.        Scheduled Meds  aspirin, 81 mg, Oral, Daily  atorvastatin, 80 mg, Oral, Nightly  famotidine, 40 mg, Oral, Daily  folic acid, 1 mg, Oral, Daily  gabapentin, 100 mg, Oral, Q12H  [START ON 11/11/2022] lisinopril, 10 mg, Oral, Q24H  metoprolol succinate XL, 50 mg, Oral, Q24H  multivitamin, 1 tablet, Oral, Daily  nicotine, 1 patch, Transdermal, Q24H  sodium chloride, 10 mL, Intravenous, Q12H  thiamine, 100 mg, Oral, Daily    Continuous Infusions   PRN Meds  •  acetaminophen  •  LORazepam **OR** LORazepam **OR** LORazepam **OR** LORazepam **OR** LORazepam **OR** LORazepam  •  ondansetron **OR** ondansetron  •  senna-docusate sodium  •  sodium chloride  •  sodium chloride  •  traMADol     Diet  Diet Regular; Cardiac    I have personally reviewed:  [x]  Laboratory   []  Microbiology   [x]  Radiology   [x]  EKG/Telemetry   []   Cardiology/Vascular   []  Pathology   []  Records    CIWA (last 2 days)     Date/Time CIWA-Ar Score    11/10/22 0830 1    11/09/22 2103 1    11/09/22 2034 0    11/09/22 1758 15    11/09/22 1700 18        Results for orders placed during the hospital encounter of 05/09/22    Adult Transthoracic Echo Complete W/ Cont if Necessary Per Protocol    Interpretation Summary  · The left ventricular cavity is mildly dilated.  · Estimated left ventricular EF = 18% Left ventricular systolic function is severely decreased.  · Left ventricular diastolic function was normal.  · The following left ventricular wall segments are hypokinetic: mid anterior, apical anterior, basal anterolateral, mid anterolateral, apical lateral, basal inferolateral, mid inferolateral, apical inferior, mid inferior, apical septal, basal inferoseptal, mid inferoseptal, apex hypokinetic, mid anteroseptal, basal anterior, basal inferior and basal inferoseptal.        Assessment/Plan     Active Hospital Problems    Diagnosis  POA   • **Chest pain [R07.9]  Yes   • Acute chest pain [R07.9]  Yes   • Alcohol withdrawal syndrome without complication (HCC) [F10.930]  Yes      Resolved Hospital Problems   No resolved problems to display.     46 y.o. male admitted with alcohol withdrawal and chest pain    Alcohol withdrawal  -Received phenobarbital in ED  -Most recent CIWA scores improved  -As needed Ativan    Chest pain, pleuritic pain, history of nonischemic cardiomyopathy, HTN  -EF 18% May 2022  -Dimer negative  -Cardiology has evaluated and repeat echocardiogram is pending    SCDs for DVT prophylaxis    Discussed with patient, nursing staff, CCP and care team on multidisciplinary rounds    Discharge: TBD probably 1 to 2 days    Wilman Sheikh MD  [facility]  11/10/22

## 2022-11-10 NOTE — CONSULTS
Patient Name: Bogdan Jeter  :1975  46 y.o.    Date of Admission: 2022  Date of Consultation:  11/10/22  Encounter Provider: Angelica Fall RN  Place of Service: [facility] CARDIOLOGY  Referring Provider: Braxton Lyon MD  Patient Care Team:  Provider, No Known as PCP - General      Chief complaint: Chest pain    History of Present Illness:    This is a 46 year old man normally followed by Dr. Villagomez with a history of tobacco abuse, nonischemic cardiomyopathy, & sinus tachycardia in the setting of alcohol withdrawal during a hospitalization in May of 2022. Echocardiogram was also done which found EF of 18%. He was started on metoprolol succinate and ramipril, however admits to medication noncompliance.  He has not taken his medicine for the last month and a half or so.    He followed up in the office with JESSE Flanagan, in 2022.  At that time, he seemed to be doing well & subsequently had a follow up stress test on 22 with no evidence of ischemia. He had a follow up with Dr. Villagomez on 22 where he was encouraged to smoke and drink less.    Prior to arrival to the hospital yesterday he states that he had not been drinking for about a day and a half.  He had a sharp stabbing pain in his substernal chest.  He states this has not recurred.  He states that he does not vomit regularly.  He usually drinks about a pint of alcohol a day.  He started showed some signs of withdrawal including hypertension and tachycardia and CIWA protocol was initiated and he was admitted to the hospital.  He has not had any hallucinations or seizures.  He is tachycardic with a heart rate of 110.  He is hypertensive blood pressure is 160/100.  He is apparently comfortable with no complaints this morning other than being hungry and thirsty.    1st troponin of 0.098. 2nd troponin of 0.112. 3rd troponin of 0.145.  EKG shows sinus tachycardia, LAFB.    XR chest 2 vw on  11/9/22-  FINDINGS: The cardiomediastinal silhouette is normal. The lungs are  clear. The costophrenic sulci are dry and the bones appear normal. There  is no pneumothorax.     IMPRESSION:  Negative.    Previous Testing-    Stress Test on 7/27/22-  Interpretation Summary    • Diaphragmatic attenuation artifact is present.  • Left ventricular ejection fraction is normal. (Calculated EF = 49%).  • Myocardial perfusion imaging indicates a normal myocardial perfusion study with no evidence of ischemia.  • Impressions are consistent with a low risk study.    Echocardiogram on 5/20/22-  Interpretation Summary    • The left ventricular cavity is mildly dilated.  • Estimated left ventricular EF = 18% Left ventricular systolic function is severely decreased.  • Left ventricular diastolic function was normal.  • The following left ventricular wall segments are hypokinetic: mid anterior, apical anterior, basal anterolateral, mid anterolateral, apical lateral, basal inferolateral, mid inferolateral, apical inferior, mid inferior, apical septal, basal inferoseptal, mid inferoseptal, apex hypokinetic, mid anteroseptal, basal anterior, basal inferior and basal inferoseptal.      History reviewed. No pertinent past medical history.    History reviewed. No pertinent surgical history.      Prior to Admission medications    Medication Sig Start Date End Date Taking? Authorizing Provider   folic acid (FOLVITE) 1 MG tablet Take 1 tablet by mouth Daily. 5/21/22   Murali Aquino MD   gabapentin (NEURONTIN) 100 MG capsule Take 1 capsule by mouth Every 12 (Twelve) Hours for 3 days. 5/20/22 11/9/22  Murali Aquino MD   lidocaine (LIDODERM) 5 % Place 2 patches on the skin as directed by provider Daily. Remove & Discard patch within 12 hours or as directed by MD  Patient taking differently: Place 2 patches on the skin as directed by provider Daily. 5/21/22   Murali Aquino MD   metoprolol succinate XL (TOPROL-XL) 50 MG 24 hr tablet Take 1  "tablet by mouth Daily. 8/24/22   Sharona Villagomez MD   nicotine (NICODERM CQ) 21 MG/24HR patch Place 1 patch on the skin as directed by provider Daily. 5/21/22   Murali Aquino MD   ramipril (ALTACE) 1.25 MG capsule Take 1 capsule by mouth Daily. 8/24/22   Sharona Villagomez MD   thiamine (VITAMIN B-1) 100 MG tablet  tablet Take 1 tablet by mouth Daily. 5/21/22   Murali Aquino MD       No Known Allergies    Social History     Socioeconomic History   • Marital status: Single   Tobacco Use   • Smoking status: Every Day     Packs/day: 1.00     Years: 30.00     Pack years: 30.00     Types: Cigarettes   • Smokeless tobacco: Never   Vaping Use   • Vaping Use: Never used   Substance and Sexual Activity   • Alcohol use: Yes     Alcohol/week: 110.0 standard drinks     Types: 5 Cans of beer, 105 Drinks containing 0.5 oz of alcohol per week   • Drug use: Yes     Frequency: 7.0 times per week     Types: Marijuana     Comment: daily use, last used 1 day ago.   • Sexual activity: Defer       History reviewed. No pertinent family history.    REVIEW OF SYSTEMS:   All systems reviewed.  Pertinent positives identified in HPI.  All other systems are negative.      Objective:     Vitals:    11/09/22 1700 11/09/22 2034 11/09/22 2103 11/09/22 2327   BP: 134/83 146/96 (!) 157/101 142/98   BP Location: Right arm Right arm Right arm Right arm   Patient Position: Sitting Lying Lying Lying   Pulse: 106  104 113   Resp: 20 18 18 18   Temp: 98.5 °F (36.9 °C) 98.9 °F (37.2 °C)  98.5 °F (36.9 °C)   TempSrc: Oral Oral  Oral   SpO2: 94%  93% 93%   Weight: 80.6 kg (177 lb 12.8 oz)      Height: 180.3 cm (71\")        Body mass index is 24.8 kg/m².    General Appearance:    Alert, cooperative, in no acute distress   Head:    Normocephalic, without obvious abnormality, atraumatic   Eyes:            Lids and lashes normal, conjunctivae and sclerae normal, no icterus, no pallor, corneas clear, PERRLA   Ears:    Ears appear intact with no abnormalities " noted   Throat:   No oral lesions, no thrush, oral mucosa moist   Neck:   No adenopathy, supple, trachea midline, no thyromegaly, no carotid bruit, no JVD   Back:     No kyphosis present, no scoliosis present, no skin lesions, erythema or scars, no tenderness to percussion or palpation, range of motion normal   Lungs:     Clear to auscultation, respirations regular, even and unlabored    Heart:   Regular tachycardic, normal S1 and S2, no murmur, no gallop, no rub, no click   Chest Wall:    No abnormalities observed   Abdomen:     Normal bowel sounds, no masses, no organomegaly, soft, nontender, nondistended, no guarding, no rebound  tenderness   Extremities:   Moves all extremities well, no edema, no cyanosis, no redness   Pulses:   Pulses palpable and equal bilaterally. Normal radial, carotid, femoral, dorsalis pedis and posterior tibial pulses bilaterally. Normal abdominal aorta   Skin:  Psychiatric:   No bleeding, bruising or rash    Alert and oriented x 3, normal mood and affect   Lab Review:     Results from last 7 days   Lab Units 11/10/22  0548   SODIUM mmol/L 138   POTASSIUM mmol/L 4.4   CHLORIDE mmol/L 101   CO2 mmol/L 25.4   BUN mg/dL 6   CREATININE mg/dL 0.81   CALCIUM mg/dL 9.3   BILIRUBIN mg/dL 0.9   ALK PHOS U/L 106   ALT (SGPT) U/L 30   AST (SGOT) U/L 45*   GLUCOSE mg/dL 96     Results from last 7 days   Lab Units 11/10/22  0548 11/09/22  1350 11/09/22  1223   TROPONIN T ng/mL 0.145* 0.112* 0.098*     Results from last 7 days   Lab Units 11/10/22  0548   WBC 10*3/mm3 10.34   HEMOGLOBIN g/dL 15.1   HEMATOCRIT % 43.7   PLATELETS 10*3/mm3 196     Results from last 7 days   Lab Units 11/09/22  1223   INR  0.86*   APTT seconds 29.0     Results from last 7 days   Lab Units 11/10/22  0548   MAGNESIUM mg/dL 1.9     Results from last 7 days   Lab Units 11/10/22  0548   CHOLESTEROL mg/dL 197   TRIGLYCERIDES mg/dL 63   HDL CHOL mg/dL 71*   LDL CHOL mg/dL 114*             EKG on 11/9/22-      Baseline EKG on  8/24/22-      I personally viewed and interpreted the patient's EKG/Telemetry data.        Assessment and Plan:       1.  Chest pain: Sharp stabbing chest pain with flat and mildly elevated troponins in the setting of known cardiomyopathy.  He is hypertensive tachycardic due to alcohol withdrawal.  EKG is nonischemic.  We will plan an echocardiogram which was previously scheduled as an outpatient to assess his current 6 systolic ejection fraction.  2.  Nonischemic cardiomyopathy: Normal stress in July.  No plans for invasive ischemic evaluation at this time.  3.  Hypertension, tachycardia.  Restart home beta-blocker.  Restart home ACE inhibitor.  4.  Alcohol withdrawal    Erika Hayes MD  [facility]  11/10/22

## 2022-11-10 NOTE — CONSULTS
Name: Bogdan Jeter ADMIT: 2022   : 1975  PCP: Provider, No Known    MRN: [id number] LOS: 0 days   AGE/SEX: 46 y.o. male  ROOM: N5/     Chief Complaint   Patient presents with   • Chest Pain   Consult for worsening alcohol withdrawal/chest pain/admission from observation unit    Subjective   HPI  Mr. Jeter is a 46 y.o. male initially admitted to the observation unit with chest pain.  There he began to have increasing alcohol withdrawal with a CIWA score of as high as 30s.  He received phenobarbital in the observation unit.  Currently he is alert and calm.  He is not reporting any hallucination or tremor.  He reports the chest pain has been sternal and consistent.  He does report it is having slow improvement.  He is not currently having any radiation to neck or arm.  He did have some right-sided pain with inspiration earlier but that is now resolved.  He reports no diaphoresis.  He is not having any shortness of breath now although he reports he did have dyspnea earlier.  He has not noticed any change in the pain with exertion or with positioning.  No swelling or orthopnea reported.  Not reporting any productive cough.  No fevers or chills.  He had some nausea.  No abdominal pain vomiting or diarrhea reported.      History reviewed. No pertinent past medical history.  History reviewed. No pertinent surgical history.  History reviewed. No pertinent family history.  Social History     Tobacco Use   • Smoking status: Every Day     Packs/day: 1.00     Years: 30.00     Pack years: 30.00     Types: Cigarettes   • Smokeless tobacco: Never   Vaping Use   • Vaping Use: Never used   Substance Use Topics   • Alcohol use: Yes     Alcohol/week: 110.0 standard drinks     Types: 5 Cans of beer, 105 Drinks containing 0.5 oz of alcohol per week   • Drug use: Yes     Frequency: 7.0 times per week     Types: Marijuana     Comment: daily use, last used 1 day ago.     Medications Prior to Admission   Medication  Sig Dispense Refill Last Dose   • folic acid (FOLVITE) 1 MG tablet Take 1 tablet by mouth Daily.   More than a month   • gabapentin (NEURONTIN) 100 MG capsule Take 1 capsule by mouth Every 12 (Twelve) Hours for 3 days. 6 capsule 0 More than a month   • lidocaine (LIDODERM) 5 % Place 2 patches on the skin as directed by provider Daily. Remove & Discard patch within 12 hours or as directed by MD (Patient taking differently: Place 2 patches on the skin as directed by provider Daily.)   More than a month   • metoprolol succinate XL (TOPROL-XL) 50 MG 24 hr tablet Take 1 tablet by mouth Daily. 90 tablet 3 More than a month   • nicotine (NICODERM CQ) 21 MG/24HR patch Place 1 patch on the skin as directed by provider Daily.   More than a month   • ramipril (ALTACE) 1.25 MG capsule Take 1 capsule by mouth Daily. 90 capsule 3 More than a month   • thiamine (VITAMIN B-1) 100 MG tablet  tablet Take 1 tablet by mouth Daily.   More than a month     Allergies:  No Known Allergies    Review of Systems   Constitutional: Negative for chills and fever.   HENT: Negative for sore throat and trouble swallowing.    Eyes: Negative for pain and visual disturbance.   Respiratory: Positive for shortness of breath. Negative for cough and wheezing.    Cardiovascular: Positive for chest pain. Negative for palpitations and leg swelling.   Gastrointestinal: Positive for nausea. Negative for constipation, diarrhea and vomiting.   Endocrine: Negative for cold intolerance and heat intolerance.   Genitourinary: Negative for difficulty urinating and dysuria.   Musculoskeletal: Negative for neck pain and neck stiffness.   Skin: Negative for pallor and rash.   Allergic/Immunologic: Negative for food allergies.   Neurological: Negative for seizures and syncope.   Hematological: Negative for adenopathy. Does not bruise/bleed easily.   Psychiatric/Behavioral: Negative for agitation and confusion.        Objective    Vital Signs  Temp:  [98.5 °F (36.9  °C)-98.9 °F (37.2 °C)] 98.9 °F (37.2 °C)  Heart Rate:  [] 104  Resp:  [18-20] 18  BP: (123-157)/() 157/101  SpO2:  [92 %-97 %] 93 %  on  Flow (L/min):  [1] 1;   Device (Oxygen Therapy): nasal cannula  Body mass index is 24.8 kg/m².    Physical Exam  Vitals and nursing note reviewed.   Constitutional:       General: He is not in acute distress.     Appearance: He is ill-appearing. He is not diaphoretic.   HENT:      Head: Atraumatic.      Comments: Right temple with 3 or 4 soft areas, nontender, patient reports he was going to get them lanced  Eyes:      General:         Right eye: No discharge.         Left eye: No discharge.      Conjunctiva/sclera: Conjunctivae normal.   Cardiovascular:      Rate and Rhythm: Normal rate and regular rhythm.      Pulses: Normal pulses.   Pulmonary:      Effort: Pulmonary effort is normal.      Breath sounds: No wheezing.   Abdominal:      General: There is distension.      Palpations: Abdomen is soft.      Tenderness: There is no abdominal tenderness. There is no guarding or rebound.   Musculoskeletal:         General: No tenderness.      Cervical back: Neck supple. No tenderness.      Right lower leg: No edema.      Left lower leg: No edema.   Skin:     General: Skin is warm and dry.   Neurological:      Mental Status: He is alert.      Cranial Nerves: No cranial nerve deficit.   Psychiatric:         Mood and Affect: Mood normal.         Behavior: Behavior normal.         Results Review:  I reviewed the patient's new clinical results.  I reviewed imaging, agree with interpretation.  I reviewed EKG/telemetry, sinus on telemetry.  I reviewed prior records.    Lab Results (last 24 hours)     Procedure Component Value Units Date/Time    Comprehensive Metabolic Panel [[id number]]  (Abnormal) Collected: 11/09/22 1223    Specimen: Blood Updated: 11/09/22 1319     Glucose 124 mg/dL      BUN 6 mg/dL      Creatinine 0.75 mg/dL      Sodium 136 mmol/L      Potassium 4.3 mmol/L       Comment: Slight hemolysis detected by analyzer. Results may be affected.        Chloride 104 mmol/L      CO2 20.6 mmol/L      Calcium 8.9 mg/dL      Total Protein 7.5 g/dL      Albumin 3.90 g/dL      ALT (SGPT) 35 U/L      AST (SGOT) 50 U/L      Comment: Slight hemolysis detected by analyzer. Results may be affected.        Alkaline Phosphatase 124 U/L      Total Bilirubin 0.3 mg/dL      Globulin 3.6 gm/dL      A/G Ratio 1.1 g/dL      BUN/Creatinine Ratio 8.0     Anion Gap 11.4 mmol/L      eGFR 112.0 mL/min/1.73      Comment: National Kidney Foundation and American Society of Nephrology (ASN) Task Force recommended calculation based on the Chronic Kidney Disease Epidemiology Collaboration (CKD-EPI) equation refit without adjustment for race.       Narrative:      GFR Normal >60  Chronic Kidney Disease <60  Kidney Failure <15      Troponin [[id number]]  (Abnormal) Collected: 11/09/22 1223    Specimen: Blood Updated: 11/09/22 1320     Troponin T 0.098 ng/mL     Narrative:      Troponin T Reference Range:  <= 0.03 ng/mL-   Negative for AMI  >0.03 ng/mL-     Abnormal for myocardial necrosis.  Clinicians would have to utilize clinical acumen, EKG, Troponin and serial changes to determine if it is an Acute Myocardial Infarction or myocardial injury due to an underlying chronic condition.       Results may be falsely decreased if patient taking Biotin.      Protime-INR [[id number]]  (Abnormal) Collected: 11/09/22 1223    Specimen: Blood Updated: 11/09/22 1352     Protime 11.8 Seconds      INR 0.86    aPTT [[id number]]  (Normal) Collected: 11/09/22 1223    Specimen: Blood Updated: 11/09/22 1352     PTT 29.0 seconds     Magnesium [[id number]]  (Normal) Collected: 11/09/22 1223    Specimen: Blood Updated: 11/09/22 1413     Magnesium 2.3 mg/dL     CBC & Differential [[id number]]  (Normal) Collected: 11/09/22 1350    Specimen: Blood Updated: 11/09/22 1413    Narrative:      The following orders were created for panel order CBC &  Differential.  Procedure                               Abnormality         Status                     ---------                               -----------         ------                     CBC Auto Differential[[id number]]        Normal              Final result                 Please view results for these tests on the individual orders.    Troponin [[id number]]  (Abnormal) Collected: 11/09/22 1350    Specimen: Blood Updated: 11/09/22 1440     Troponin T 0.112 ng/mL     Narrative:      Troponin T Reference Range:  <= 0.03 ng/mL-   Negative for AMI  >0.03 ng/mL-     Abnormal for myocardial necrosis.  Clinicians would have to utilize clinical acumen, EKG, Troponin and serial changes to determine if it is an Acute Myocardial Infarction or myocardial injury due to an underlying chronic condition.       Results may be falsely decreased if patient taking Biotin.      CBC Auto Differential [[id number]]  (Normal) Collected: 11/09/22 1350    Specimen: Blood Updated: 11/09/22 1413     WBC 6.85 10*3/mm3      RBC 4.52 10*6/mm3      Hemoglobin 14.8 g/dL      Hematocrit 43.0 %      MCV 95.1 fL      MCH 32.7 pg      MCHC 34.4 g/dL      RDW 13.3 %      RDW-SD 46.2 fl      MPV 9.0 fL      Platelets 199 10*3/mm3      Neutrophil % 56.9 %      Lymphocyte % 29.9 %      Monocyte % 10.1 %      Eosinophil % 1.5 %      Basophil % 1.2 %      Immature Grans % 0.4 %      Neutrophils, Absolute 3.90 10*3/mm3      Lymphocytes, Absolute 2.05 10*3/mm3      Monocytes, Absolute 0.69 10*3/mm3      Eosinophils, Absolute 0.10 10*3/mm3      Basophils, Absolute 0.08 10*3/mm3      Immature Grans, Absolute 0.03 10*3/mm3      nRBC 0.0 /100 WBC     BNP [[id number]]  (Normal) Collected: 11/09/22 1350    Specimen: Blood Updated: 11/09/22 1744     proBNP 58.7 pg/mL     Narrative:      Among patients with dyspnea, NT-proBNP is highly sensitive for the detection of acute congestive heart failure. In addition NT-proBNP of <300 pg/ml effectively rules out acute  congestive heart failure with 99% negative predictive value.    Results may be falsely decreased if patient taking Biotin.            XR Chest 2 View   Final Result   Negative.       This report was finalized on 11/9/2022 1:45 PM by Dr. Kehinde Dudley M.D.            Assessment & Plan      Active Hospital Problems    Diagnosis  POA   • **Chest pain [R07.9]  Yes   • Acute chest pain [R07.9]  Yes   • Alcohol withdrawal syndrome without complication (HCC) [F10.930]  Yes      Resolved Hospital Problems   No resolved problems to display.     · Chest pain: Had elevated but flat troponin.  Sinus tachycardia on EKG.  Going to give aspirin statin and continue the beta-blocker.  Screen D-dimer with the potential pleuritic component.  Cardiology consulted.  · Alcohol withdrawal: Has been given phenobarbital protocol.  Has a history of requiring ICU and Precedex.  Drinks a pint of liquor per day.  Give vitamin supplementation.  Addition of Ativan as needed.  Consult access.  · Hypertension: Continue lisinopril and metoprolol.  Monitor.  · Prophylaxis: Lovenox      I discussed the patients findings and my recommendations with patient and consulting provider.      Rogerio Arevalo MD  [facility]  11/09/22  22:45 EST    Dictated portions using Dragon dictation software.  During the entire encounter, I was wearing recommended PPE including face mask and eye protection. Hand sanitization was performed prior to entering room and upon exit.

## 2022-11-10 NOTE — NURSING NOTE
Patient CIWA score on arrival to floor a 1. No current complaints. He wanted  something to eat and then to go to sleep. Will continue to monitor.

## 2022-11-10 NOTE — PAYOR COMM NOTE
"Bogdan Pearson (46 y.o. Male)     PLEASE SEE ATTACHED FOR INPT AUTH    REF # [id number]    PLEASE CALL ESTHELA @ [phone] OR [phone]    THANK YOU      Date of Birth   1975    Social Security Number       Address   [address] [id number]    Home Phone   [phone]    MRN   [id number]       Bahai   None    Marital Status   Single                            Admission Date   11/9/22    Admission Type   Emergency    Admitting Provider   Rogerio Arevalo MD    Attending Provider   Wilman Sheikh MD    Department, Room/Bed   [address], [address]/1       Discharge Date       Discharge Disposition       Discharge Destination                               Attending Provider: Wilman Sheikh MD    Allergies: No Known Allergies    Isolation: None   Infection: None   Code Status: CPR    Ht: 180.3 cm (71\")   Wt: 80.6 kg (177 lb 12.8 oz)    Admission Cmt: None   Principal Problem: Chest pain [R07.9]                 Active Insurance as of 11/9/2022     Primary Coverage     Payor Plan Insurance Group Employer/Plan Group    [facility] COMMUNITY PLAN [facility] COMMUNITY PLAN [facility]     Payor Plan Address Payor Plan Phone Number Payor Plan Fax Number Effective Dates    [address]   10/1/2022 - None Entered    [facility] [id number]       Subscriber Name Subscriber Birth Date Member ID       BOGDAN PEARSON 1975 [id number]                 Emergency Contacts      (Rel.) Home Phone Work Phone Mobile Phone    KRYSTAL WEBSTER (Father) [phone] -- [phone]            [address]: [facility] [id number]  Tax ID [id number]     History & Physical      Nereyda Hagan APRN at 11/09/22 1651     Attestation signed by Braxton Lyon MD at 11/09/22 2009    The AISHWARYA and I have discussed this patients history, physical exam, and treatment plan. I have reviewed the documentation and personally had a face to face interaction with the patient. I affirm the " documentation and agree with the treatment and plan.  The separate note describes my personal findings                   [facility]   HISTORY AND PHYSICAL    Patient Name: Bogdan Jeter  : 1975  MRN: [id number]  Primary Care Physician:  Provider, No Known  Date of admission: 2022    Subjective    Subjective     Chief Complaint:   Chief Complaint   Patient presents with   • Chest Pain         HPI:    Bogdan Jeter is a 46 y.o. male with history of chronic systolic heart failure, alcohol dependence and tobacco abuse who presented to [facility] emergency department today complaining of chest pain.  It is described as dull and achy at the bottom of his sternum that started around 9 AM.  Pain is described as constant and nonradiating.  He denies any associated shortness of breath, diaphoresis or GI symptoms.  Pt does endorse drinking greater than 1 pint of alcohol/daily and is high risk for alcohol withdrawal.     Review of Systems   All systems were reviewed and negative except for: chest pain and tremors    Personal History     No past medical history on file.    No past surgical history on file.    Family History: family history is not on file. Otherwise pertinent FHx was reviewed and not pertinent to current issue.    Social History:  reports that he has been smoking cigarettes. He has a 15.00 pack-year smoking history. He has never used smokeless tobacco. He reports that he does not currently use alcohol after a past usage of about 5.0 standard drinks per week. He reports that he does not currently use drugs after having used the following drugs: Marijuana.    Home Medications:  folic acid, gabapentin, lidocaine, metoprolol succinate XL, nicotine, ramipril, and thiamine    Allergies:  No Known Allergies    Objective    Objective     Vitals:   Temp:  [98.8 °F (37.1 °C)] 98.8 °F (37.1 °C)  Heart Rate:  [] 114  Resp:  [18] 18  BP: (123-154)/(79-95) 123/79  Physical Exam    Constitutional:  Awake, alert   Eyes: PERRLA, sclerae anicteric, no conjunctival injection   HENT: NCAT, mucous membranes moist   Neck: Supple, no thyromegaly, no lymphadenopathy, trachea midline   Respiratory: Clear to auscultation bilaterally, nonlabored respirations    Cardiovascular: RRR, no murmurs, rubs, or gallops, palpable pedal pulses bilaterally   Gastrointestinal: Positive bowel sounds, soft, nontender, nondistended   Musculoskeletal: No bilateral ankle edema, no clubbing or cyanosis to extremities   Psychiatric: Appropriate affect, cooperative   Neurologic: Oriented x 3, strength symmetric in all extremities, Cranial Nerves grossly intact to confrontation, speech clear   Skin: No rashes     Result Review    Result Review:  I have personally reviewed the results from the time of this admission to 11/9/2022 16:51 EST and agree with these findings:  [x]  Laboratory list / accordion  []  Microbiology  [x]  Radiology  [x]  EKG/Telemetry   [x]  Cardiology/Vascular   []  Pathology  []  Old records  []  Other:  Most notable findings include: Patient had a stress test dated July 27 of this year.  The LVEF is normal calculated at 49%.  It was a low risk study with no evidence of ischemia.    Assessment & Plan   Assessment / Plan     Brief Patient Summary:  Bogdan Jeter is a 46 y.o. male who presented to ER with chest pain located in sternal area described as pressure.  It has been constant since 9 AM this morning while working.  The chest pain does not radiate.  He denies associated symptoms of diaphoresis, nausea or vomiting and has no shortness of breath.  He does report significant alcohol intake daily and is a smoker.  Denies history of cardiac cath but is established with Dr. Villagomez for some congestive heart failure.    Active Hospital Problems:  Active Hospital Problems    Diagnosis    • **Chest pain      Plan:     Chest pain   -consult Cards  -consider repeat stress test  -trend troponin's  -treat chest pain    Alcohol  withdrawal  -phenobarb ordered for 3 doses  -pt to be upgraded to [facility] admission  -closely monitor CIWA every 4 hours    Nicotine withdrawal  -nicotine patch            DVT prophylaxis:  No DVT prophylaxis order currently exists.    CODE STATUS:       Admission Status:  I believe this patient meets observation status.  Due to CIWA 13 and high probablility of alcohol withdrawal, I will admit patient to A. I have discussed with Dr. Arevalo who agrees to admit the patient.     Electronically signed by JESSE Amanda, 11/09/22, 4:51 PM EST.               Electronically signed by Braxton Lyon MD at 11/09/22 2009          Emergency Department Notes      Marisol Castro, RN at 11/09/22 1208        Patient to Er via car from home for chest pain x this morning    Patient does report a heart hx    Patient wearing mask this RN in PPE    Electronically signed by Marisol Castro, RN at 11/09/22 1209     Steven Taylor MD at 11/09/22 1331           EMERGENCY DEPARTMENT ENCOUNTER  I wore full protective equipment throughout this patient encounter including a N95 mask, eye shield, gown and gloves. Hand hygiene was performed before donning protective equipment and after removal when leaving the room.    Room Number:  34/34  Date of encounter:  11/9/2022  PCP: Provider, No Known    HPI:  Context: Bogdan Jeter is a 46 y.o. male who presents to the ED c/o chief complaint of chest pain.  Patient complains of dull achy chest pain at the bottom of his sternum that has been constant since approximately 9 AM.  Patient reports pain started up shortly after he arrived at work.  Pain is constant, does not radiate, is not exertional, patient denies any aggravating or ameliorating factors.  No associated shortness of breath, no nausea vomiting, no diaphoresis.  Patient reports that he was at baseline health prior to onset of pain.  Patient reports history of heart failure, denies any history of MI, does report that he has  had a stress test in the past, denies any prior cardiac caths.    MEDICAL HISTORY REVIEW  Reviewed in EPIC    PAST MEDICAL HISTORY  Active Ambulatory Problems     Diagnosis Date Noted   • Tobacco abuse 05/09/2022   • Sinus tachycardia 05/17/2022   • Chronic systolic heart failure (CMS/HCC) 05/20/2022   • Alcohol dependence with other alcohol-induced disorder (HCC) 07/20/2022     Resolved Ambulatory Problems     Diagnosis Date Noted   • Intractable back pain 05/09/2022   • Hypokalemia 05/09/2022   • Alcohol withdrawal (HCC) 05/09/2022   • Heart burn 05/09/2022   • Elevated BP without diagnosis of hypertension 05/17/2022     No Additional Past Medical History       PAST SURGICAL HISTORY  No past surgical history on file.    FAMILY HISTORY  No family history on file.    SOCIAL HISTORY  Social History     Socioeconomic History   • Marital status: Single   Tobacco Use   • Smoking status: Every Day     Packs/day: 1.00     Years: 15.00     Pack years: 15.00     Types: Cigarettes   • Smokeless tobacco: Never   Vaping Use   • Vaping Use: Never used   Substance and Sexual Activity   • Alcohol use: Not Currently     Alcohol/week: 5.0 standard drinks     Types: 5 Cans of beer per week   • Drug use: Not Currently     Types: Marijuana   • Sexual activity: Defer       ALLERGIES  Patient has no known allergies.    The patient's allergies have been reviewed    REVIEW OF SYSTEMS  All systems reviewed and negative except for those discussed in HPI.     PHYSICAL EXAM  I have reviewed the triage vital signs and nursing notes.  ED Triage Vitals [11/09/22 1209]   Temp Heart Rate Resp BP SpO2   98.8 °F (37.1 °C) 79 18 -- 97 %      Temp src Heart Rate Source Patient Position BP Location FiO2 (%)   -- -- -- -- --       General: No acute distress.  HENT: NCAT, PERRL, Nares patent.  Eyes: no scleral icterus.  Neck: trachea midline, no ROM limitations.  CV: regular rhythm, regular rate.  Respiratory: normal effort, CTAB.  Abdomen: soft,  nondistended, NTTP, no rebound tenderness, no guarding or rigidity.  Musculoskeletal: no deformity.  Neuro: alert, moves all extremities, follows commands.  Skin: warm, dry.    LAB RESULTS  Recent Results (from the past 24 hour(s))   ECG 12 Lead ED Triage Standing Order; Chest Pain    Collection Time: 11/09/22 12:12 PM   Result Value Ref Range    QT Interval 333 ms   Comprehensive Metabolic Panel    Collection Time: 11/09/22 12:23 PM    Specimen: Blood   Result Value Ref Range    Glucose 124 (H) 65 - 99 mg/dL    BUN 6 6 - 20 mg/dL    Creatinine 0.75 (L) 0.76 - 1.27 mg/dL    Sodium 136 136 - 145 mmol/L    Potassium 4.3 3.5 - 5.2 mmol/L    Chloride 104 98 - 107 mmol/L    CO2 20.6 (L) 22.0 - 29.0 mmol/L    Calcium 8.9 8.6 - 10.5 mg/dL    Total Protein 7.5 6.0 - 8.5 g/dL    Albumin 3.90 3.50 - 5.20 g/dL    ALT (SGPT) 35 1 - 41 U/L    AST (SGOT) 50 (H) 1 - 40 U/L    Alkaline Phosphatase 124 (H) 39 - 117 U/L    Total Bilirubin 0.3 0.0 - 1.2 mg/dL    Globulin 3.6 gm/dL    A/G Ratio 1.1 g/dL    BUN/Creatinine Ratio 8.0 7.0 - 25.0    Anion Gap 11.4 5.0 - 15.0 mmol/L    eGFR 112.0 >60.0 mL/min/1.73   Troponin    Collection Time: 11/09/22 12:23 PM    Specimen: Blood   Result Value Ref Range    Troponin T 0.098 (C) 0.000 - 0.030 ng/mL   Green Top (Gel)    Collection Time: 11/09/22 12:23 PM   Result Value Ref Range    Extra Tube Hold for add-ons.    Lavender Top    Collection Time: 11/09/22 12:23 PM   Result Value Ref Range    Extra Tube hold for add-on    Gold Top - SST    Collection Time: 11/09/22 12:23 PM   Result Value Ref Range    Extra Tube Hold for add-ons.    Light Blue Top    Collection Time: 11/09/22 12:23 PM   Result Value Ref Range    Extra Tube Hold for add-ons.    Protime-INR    Collection Time: 11/09/22 12:23 PM    Specimen: Blood   Result Value Ref Range    Protime 11.8 11.7 - 14.2 Seconds    INR 0.86 (L) 0.90 - 1.10   aPTT    Collection Time: 11/09/22 12:23 PM    Specimen: Blood   Result Value Ref Range    PTT  29.0 22.7 - 35.4 seconds   Magnesium    Collection Time: 11/09/22 12:23 PM    Specimen: Blood   Result Value Ref Range    Magnesium 2.3 1.6 - 2.6 mg/dL   Troponin    Collection Time: 11/09/22  1:50 PM    Specimen: Blood   Result Value Ref Range    Troponin T 0.112 (C) 0.000 - 0.030 ng/mL   CBC Auto Differential    Collection Time: 11/09/22  1:50 PM    Specimen: Blood   Result Value Ref Range    WBC 6.85 3.40 - 10.80 10*3/mm3    RBC 4.52 4.14 - 5.80 10*6/mm3    Hemoglobin 14.8 13.0 - 17.7 g/dL    Hematocrit 43.0 37.5 - 51.0 %    MCV 95.1 79.0 - 97.0 fL    MCH 32.7 26.6 - 33.0 pg    MCHC 34.4 31.5 - 35.7 g/dL    RDW 13.3 12.3 - 15.4 %    RDW-SD 46.2 37.0 - 54.0 fl    MPV 9.0 6.0 - 12.0 fL    Platelets 199 140 - 450 10*3/mm3    Neutrophil % 56.9 42.7 - 76.0 %    Lymphocyte % 29.9 19.6 - 45.3 %    Monocyte % 10.1 5.0 - 12.0 %    Eosinophil % 1.5 0.3 - 6.2 %    Basophil % 1.2 0.0 - 1.5 %    Immature Grans % 0.4 0.0 - 0.5 %    Neutrophils, Absolute 3.90 1.70 - 7.00 10*3/mm3    Lymphocytes, Absolute 2.05 0.70 - 3.10 10*3/mm3    Monocytes, Absolute 0.69 0.10 - 0.90 10*3/mm3    Eosinophils, Absolute 0.10 0.00 - 0.40 10*3/mm3    Basophils, Absolute 0.08 0.00 - 0.20 10*3/mm3    Immature Grans, Absolute 0.03 0.00 - 0.05 10*3/mm3    nRBC 0.0 0.0 - 0.2 /100 WBC       I ordered the above labs and reviewed the results.    RADIOLOGY  XR Chest 2 View    Result Date: 11/9/2022  PA AND LATERAL CHEST X-RAY  HISTORY: Chest pain.  Chest x-ray consisting of three images is provided. Correlation: Chest x-ray 05/14/2022.  FINDINGS: The cardiomediastinal silhouette is normal. The lungs are clear. The costophrenic sulci are dry and the bones appear normal. There is no pneumothorax.      Negative.  This report was finalized on 11/9/2022 1:45 PM by Dr. Kehinde Dudley M.D.        I ordered the above noted radiological studies. I reviewed the images and results. I agree with the radiologist interpretation.    PROCEDURES  Procedures    MEDICATIONS  GIVEN IN ER  Medications   sodium chloride 0.9 % flush 10 mL (has no administration in time range)   nitroglycerin (NITROSTAT) SL tablet 0.4 mg (0.4 mg Sublingual Given 11/9/22 1339)   aspirin chewable tablet 324 mg (324 mg Oral Given 11/9/22 1353)       PROGRESS, DATA ANALYSIS, CONSULTS, AND MEDICAL DECISION MAKING  A complete history and physical exam have been performed.  All available laboratory and imaging results have been reviewed by myself prior to disposition.    MDM  After the initial H&P, I discussed pertinent information from history and physical exam with patient/family.  Discussed differential diagnosis.  Discussed plan for ED evaluation/workup/treatment.  All questions answered.  Patient/family is agreeable with plan.  ED Course as of 11/09/22 1519   Wed Nov 09, 2022   1314 My differential diagnosis for chest pain includes but is not limited to:  Muscle strain, costochondritis, myositis, pleurisy, rib fracture, intercostal neuritis, herpes zoster, tumor, myocardial infarction, coronary syndrome, unstable angina, angina, aortic dissection, mitral valve prolapse, pericarditis, palpitations, pulmonary embolus, pneumonia, pneumothorax, lung cancer, GERD, esophagitis, esophageal spasm     [JG]   1315 EKG independently viewed and contemporaneously interpreted by ED physician. Time: 12:12 PM.  Rate 116.  Interpretation: Sinus tachycardia, left axis deviation, left anterior fascicular block, no acute ST changes. [JG]   1322 Troponin elevated 0.098.   [JG]   1443 Repeat troponin 0.112.  Slightly increased from prior although minimally so, this could be secondary to lab sampling error. [JG]   1444 HEART SCORE:    History #0  (Highly suspicious 2, Moderately suspicious 1, Slightly or non-suspicious 0)    ECG #0  (Significant ST depression 2,  Nonspecific repol disturbance 1, Normal 0)    Age #1  (> or = 65 2, 46-65 1,  < or = 45 0)    Risk factors #1  (hypercholesterolemia, HTN, DM, smoking, pos fam hx,  obesity)  (> or = to 3 RF 2, 1 or 2 1, No risk factors 0)    Troponin #2  (> or = 3x normal limit 2, 1-3x normal limit 1, < or = Normal limit 0)    HEART Score Key:  Scores 0-3: 0.9-1.7% risk of adverse cardiac event. In the HEART Score study, these patients were discharged (0.99% in the retrospective study, 1.7% in the prospective study)  Scores 4-6: 12-16.6% risk of adverse cardiac event. In the HEART Score study, these patients were admitted to the hospital. (11.6% retrospective, 16.6% prospective)  Scores =7: 50-65% risk of adverse cardiac event. In the HEART Score study, these patients were candidates for early invasive measures. (65.2% retrospective, 50.1% prospective)      This patient's HEART score is 4     [JG]   1448 Patient reassessed.  Patient reports improvement of chest pain after aspirin nitroglycerin, pain is minimal at present, pain currently 2 out of 10.  Discussed ED work-up and results, discussed plan for admission for further evaluation.  Patient agreeable with plan, no questions or concerns. [JG]   1448 Phone call with family, AISHWARYA with [employer].  Discussed the patient, relevant history, exam, diagnostics, ED findings/progress, and concerns. They agree to admit the patient to telemetry observation under Dr. Lyon. Care assumed by the admitting physician at this time.     [JG]      ED Course User Index  [JG] Steven Taylor MD       AS OF 15:19 EST VITALS:    BP - 132/90  HR - 107  TEMP - 98.8 °F (37.1 °C)  O2 SATS - 94%    DIAGNOSIS  Final diagnoses:   Acute chest pain   Elevated troponin   Hypertension, unspecified type   Hyperglycemia         DISPOSITION  ADMISSION    Discussed treatment plan and reason for admission with pt/family and admitting physician.  Pt/family voiced understanding of the plan for admission for further testing/treatment as needed.          Steven Taylor MD  11/09/22 2927      Electronically signed by Steven Taylor MD at 11/09/22 8299     Ariadna Bay RN at  "11/09/22 1556          .Nursing report ED to floor  Bogdan Jeter  46 y.o.  male    HPI :   Chief Complaint   Patient presents with   • Chest Pain       Admitting doctor:   Braxton Lyon MD    Admitting diagnosis:   The primary encounter diagnosis was Acute chest pain. Diagnoses of Elevated troponin, Hypertension, unspecified type, and Hyperglycemia were also pertinent to this visit.    Code status:   Current Code Status       Date Active Code Status Order ID Comments User Context       Prior            Allergies:   Patient has no known allergies.    Isolation:   No active isolations    Intake and Output  No intake or output data in the 24 hours ending 11/09/22 1556    Weight:       11/09/22  1541   Weight: 78.5 kg (173 lb)       Most recent vitals:   Vitals:    11/09/22 1541 11/09/22 1545 11/09/22 1545 11/09/22 1551   BP: 154/95 133/92 133/92 123/79   BP Location: Right arm  Right arm Right arm   Patient Position: Lying  Lying Lying   Pulse: 90 112 113 114   Resp: 18  18 18   Temp:       SpO2: 95%  94% 92%   Weight: 78.5 kg (173 lb)      Height: 180.3 cm (71\")          Active LDAs/IV Access:   Lines, Drains & Airways       Active LDAs       Name Placement date Placement time Site Days    Peripheral IV 11/09/22 1540 Anterior;Left Hand 11/09/22  1540  Hand  less than 1                    Labs (abnormal labs have a star):   Labs Reviewed   COMPREHENSIVE METABOLIC PANEL - Abnormal; Notable for the following components:       Result Value    Glucose 124 (*)     Creatinine 0.75 (*)     CO2 20.6 (*)     AST (SGOT) 50 (*)     Alkaline Phosphatase 124 (*)     All other components within normal limits    Narrative:     GFR Normal >60  Chronic Kidney Disease <60  Kidney Failure <15     TROPONIN (IN-HOUSE) - Abnormal; Notable for the following components:    Troponin T 0.098 (*)     All other components within normal limits    Narrative:     Troponin T Reference Range:  <= 0.03 ng/mL-   Negative for AMI  >0.03 ng/mL-     " Abnormal for myocardial necrosis.  Clinicians would have to utilize clinical acumen, EKG, Troponin and serial changes to determine if it is an Acute Myocardial Infarction or myocardial injury due to an underlying chronic condition.       Results may be falsely decreased if patient taking Biotin.     TROPONIN (IN-HOUSE) - Abnormal; Notable for the following components:    Troponin T 0.112 (*)     All other components within normal limits    Narrative:     Troponin T Reference Range:  <= 0.03 ng/mL-   Negative for AMI  >0.03 ng/mL-     Abnormal for myocardial necrosis.  Clinicians would have to utilize clinical acumen, EKG, Troponin and serial changes to determine if it is an Acute Myocardial Infarction or myocardial injury due to an underlying chronic condition.       Results may be falsely decreased if patient taking Biotin.     PROTIME-INR - Abnormal; Notable for the following components:    INR 0.86 (*)     All other components within normal limits   CBC WITH AUTO DIFFERENTIAL - Normal   APTT - Normal   MAGNESIUM - Normal   RAINBOW DRAW    Narrative:     The following orders were created for panel order [place] Draw.  Procedure                               Abnormality         Status                     ---------                               -----------         ------                     Green Top (Gel)[[id number]]                                  Final result               Lavender Top[[id number]]                                     Final result               Gold Top - SST[[id number]]                                   Final result               Light Blue Top[[id number]]                                   Final result                 Please view results for these tests on the individual orders.   CBC AND DIFFERENTIAL    Narrative:     The following orders were created for panel order CBC & Differential.  Procedure                               Abnormality         Status                     ---------                                -----------         ------                     CBC Auto Differential[[id number]]        Normal              Final result                 Please view results for these tests on the individual orders.   GREEN TOP   LAVENDER TOP   GOLD TOP - SST   LIGHT BLUE TOP       EKG:   ECG 12 Lead ED Triage Standing Order; Chest Pain   Final Result   HEART RATE= 116  bpm   RR Interval= 517  ms   PA Interval= 138  ms   P Horizontal Axis= 5  deg   P Front Axis= 52  deg   QRSD Interval= 96  ms   QT Interval= 333  ms   QRS Axis= -55  deg   T Wave Axis= 44  deg   - ABNORMAL ECG -   Sinus tachycardia   Left anterior fascicular block   When compared with ECG of 19-May-2022 13:41:30,   No significant change   Electronically Signed By: Jus Lay ([facility]) 09-Nov-2022 13:02:52   Date and Time of Study: 2022-11-09 12:12:20          Meds given in ED:   Medications   sodium chloride 0.9 % flush 10 mL (has no administration in time range)   aspirin chewable tablet 324 mg (324 mg Oral Given 11/9/22 1353)   nitroglycerin (NITROSTAT) SL tablet 0.4 mg (0.4 mg Sublingual Given 11/9/22 1545)       Imaging results:  XR Chest 2 View    Result Date: 11/9/2022  Negative.  This report was finalized on 11/9/2022 1:45 PM by Dr. Kehinde Dudley M.D.       Ambulatory status:   - Up ad josias     Social issues:   Social History     Socioeconomic History   • Marital status: Single   Tobacco Use   • Smoking status: Every Day     Packs/day: 1.00     Years: 15.00     Pack years: 15.00     Types: Cigarettes   • Smokeless tobacco: Never   Vaping Use   • Vaping Use: Never used   Substance and Sexual Activity   • Alcohol use: Not Currently     Alcohol/week: 5.0 standard drinks     Types: 5 Cans of beer per week   • Drug use: Not Currently     Types: Marijuana   • Sexual activity: Defer       NIH Stroke Scale:         Arianda Bay RN  11/09/22 15:56 EST         Electronically signed by Ariadna Bay RN at 11/09/22 7101

## 2022-11-11 LAB
ANION GAP SERPL CALCULATED.3IONS-SCNC: 10.4 MMOL/L (ref 5–15)
BUN SERPL-MCNC: 8 MG/DL (ref 6–20)
BUN/CREAT SERPL: 11.8 (ref 7–25)
CALCIUM SPEC-SCNC: 9.6 MG/DL (ref 8.6–10.5)
CHLORIDE SERPL-SCNC: 101 MMOL/L (ref 98–107)
CO2 SERPL-SCNC: 24.6 MMOL/L (ref 22–29)
CREAT SERPL-MCNC: 0.68 MG/DL (ref 0.76–1.27)
EGFRCR SERPLBLD CKD-EPI 2021: 115.4 ML/MIN/1.73
GLUCOSE SERPL-MCNC: 90 MG/DL (ref 65–99)
POTASSIUM SERPL-SCNC: 3.8 MMOL/L (ref 3.5–5.2)
SODIUM SERPL-SCNC: 136 MMOL/L (ref 136–145)

## 2022-11-11 PROCEDURE — G0378 HOSPITAL OBSERVATION PER HR: HCPCS

## 2022-11-11 PROCEDURE — 99214 OFFICE O/P EST MOD 30 MIN: CPT | Performed by: INTERNAL MEDICINE

## 2022-11-11 PROCEDURE — 93005 ELECTROCARDIOGRAM TRACING: CPT | Performed by: STUDENT IN AN ORGANIZED HEALTH CARE EDUCATION/TRAINING PROGRAM

## 2022-11-11 PROCEDURE — 80048 BASIC METABOLIC PNL TOTAL CA: CPT | Performed by: STUDENT IN AN ORGANIZED HEALTH CARE EDUCATION/TRAINING PROGRAM

## 2022-11-11 PROCEDURE — 93010 ELECTROCARDIOGRAM REPORT: CPT | Performed by: INTERNAL MEDICINE

## 2022-11-11 PROCEDURE — 25010000002 LORAZEPAM PER 2 MG: Performed by: INTERNAL MEDICINE

## 2022-11-11 PROCEDURE — 96376 TX/PRO/DX INJ SAME DRUG ADON: CPT

## 2022-11-11 RX ORDER — POTASSIUM CHLORIDE 750 MG/1
40 TABLET, FILM COATED, EXTENDED RELEASE ORAL AS NEEDED
Status: DISCONTINUED | OUTPATIENT
Start: 2022-11-11 | End: 2022-11-12 | Stop reason: HOSPADM

## 2022-11-11 RX ORDER — MAGNESIUM SULFATE HEPTAHYDRATE 40 MG/ML
2 INJECTION, SOLUTION INTRAVENOUS AS NEEDED
Status: DISCONTINUED | OUTPATIENT
Start: 2022-11-11 | End: 2022-11-12 | Stop reason: HOSPADM

## 2022-11-11 RX ORDER — POTASSIUM CHLORIDE 1.5 G/1.77G
40 POWDER, FOR SOLUTION ORAL AS NEEDED
Status: DISCONTINUED | OUTPATIENT
Start: 2022-11-11 | End: 2022-11-12 | Stop reason: HOSPADM

## 2022-11-11 RX ORDER — MAGNESIUM SULFATE HEPTAHYDRATE 40 MG/ML
4 INJECTION, SOLUTION INTRAVENOUS AS NEEDED
Status: DISCONTINUED | OUTPATIENT
Start: 2022-11-11 | End: 2022-11-12 | Stop reason: HOSPADM

## 2022-11-11 RX ADMIN — Medication 100 MG: at 08:33

## 2022-11-11 RX ADMIN — NICOTINE 1 PATCH: 21 PATCH, EXTENDED RELEASE TRANSDERMAL at 08:33

## 2022-11-11 RX ADMIN — ASPIRIN 81 MG: 81 TABLET, COATED ORAL at 08:33

## 2022-11-11 RX ADMIN — ATORVASTATIN CALCIUM 80 MG: 80 TABLET, FILM COATED ORAL at 21:30

## 2022-11-11 RX ADMIN — TRAMADOL HYDROCHLORIDE 50 MG: 50 TABLET, COATED ORAL at 18:30

## 2022-11-11 RX ADMIN — Medication 1 TABLET: at 08:33

## 2022-11-11 RX ADMIN — Medication 10 ML: at 21:30

## 2022-11-11 RX ADMIN — LISINOPRIL 10 MG: 10 TABLET ORAL at 08:33

## 2022-11-11 RX ADMIN — LORAZEPAM 1 MG: 2 INJECTION INTRAMUSCULAR; INTRAVENOUS at 11:48

## 2022-11-11 RX ADMIN — FOLIC ACID 1 MG: 1 TABLET ORAL at 08:33

## 2022-11-11 RX ADMIN — LORAZEPAM 2 MG: 2 INJECTION INTRAMUSCULAR; INTRAVENOUS at 18:30

## 2022-11-11 RX ADMIN — GABAPENTIN 100 MG: 100 CAPSULE ORAL at 21:30

## 2022-11-11 RX ADMIN — Medication 10 ML: at 08:34

## 2022-11-11 RX ADMIN — GABAPENTIN 100 MG: 100 CAPSULE ORAL at 08:33

## 2022-11-11 RX ADMIN — FAMOTIDINE 40 MG: 20 TABLET ORAL at 08:35

## 2022-11-11 RX ADMIN — LORAZEPAM 2 MG: 2 INJECTION INTRAMUSCULAR; INTRAVENOUS at 23:59

## 2022-11-11 RX ADMIN — METOPROLOL SUCCINATE 50 MG: 50 TABLET, FILM COATED, EXTENDED RELEASE ORAL at 08:33

## 2022-11-11 NOTE — CASE MANAGEMENT/SOCIAL WORK
Continued Stay Note  [facility]     Patient Name: Bogdan Jeter  MRN: [id number]  Today's Date: 11/11/2022    Admit Date: 11/9/2022    Plan: Await medical readiness, Home, follow for dc needs   Discharge Plan     Row Name 11/11/22 1420       Plan    Plan Await medical readiness, Home, follow for dc needs    Patient/Family in Agreement with Plan other (see comments)    Plan Comments CCP called pts MATT Booker [phone] via outbound call, introduced self and explained CCP role. Verified facesheet and he confirms pt uses [facility] as a local pharmacy. He is unsure if pt has trouble affording his medications. He does not know pts PCP. He denies pt having an advance directive. Pt lives with his sister Betsy Cha [phone] in an apartment with 7 steps to enter. Prior to admission pt was IADL with mobility. Domenic confirms pt went to [facility] “for a while”  before returning back to home with his sister. He denies pt having any HH or DME needs. Domenic states he believes pts car is here at the hospital. CCP explained will continue for dc planning needs pending hospital course. Tiara BUSTOS/CCP    Row Name 11/11/22 1712       Plan    Plan Await medical readiness; plan home    Patient/Family in Agreement with Plan other (see comments)    Plan Comments Attempted to follow up with pt at bedside, pt sleeping. CIWA's elevated, pt just recently given Ativan. ACCESS consulted. Pt has hx of going to [facility] in the past. CCP to follow for dc needs once medically ready               Discharge Codes    No documentation.               Expected Discharge Date and Time     Expected Discharge Date Expected Discharge Time    Nov 12, 2022             Dian Rodriguez, RN

## 2022-11-11 NOTE — PROGRESS NOTES
Name: Bogdan Jeter ADMIT: 2022   : 1975  PCP: Provider, No Known    MRN: [id number] LOS: 2 days   AGE/SEX: 46 y.o. male  ROOM: [facility]     Subjective   Subjective   Patient feels like he is withdrawing from alcohol.  States he is having visual hallucinations and headaches.  States that he does not have any tremors currently.    Review of Systems   Constitutional: Negative for fever.   Respiratory: Negative for cough and shortness of breath.    Gastrointestinal: Negative for abdominal pain, diarrhea, nausea and vomiting.   Neurological: Positive for headaches.   Psychiatric/Behavioral: Positive for hallucinations.      Objective   Objective   Vital Signs  Temp:  [98.3 °F (36.8 °C)-98.7 °F (37.1 °C)] 98.3 °F (36.8 °C)  Heart Rate:  [] 96  Resp:  [16-18] 18  BP: (133-152)/(83-92) 135/89  SpO2:  [93 %-98 %] 93 %  on  Flow (L/min):  [1.5] 1.5;   Device (Oxygen Therapy): nasal cannula  Body mass index is 24.69 kg/m².    Physical Exam  Constitutional:       General: He is not in acute distress.     Appearance: Normal appearance. He is not toxic-appearing.   HENT:      Head: Normocephalic and atraumatic.   Cardiovascular:      Rate and Rhythm: Regular rhythm. Tachycardia present.   Pulmonary:      Effort: Pulmonary effort is normal. No respiratory distress.      Breath sounds: Normal breath sounds. No wheezing, rhonchi or rales.   Abdominal:      General: Bowel sounds are normal.      Palpations: Abdomen is soft.      Tenderness: There is no abdominal tenderness. There is no guarding or rebound.   Musculoskeletal:         General: No swelling.      Right lower leg: No edema.      Left lower leg: No edema.   Skin:     General: Skin is warm and dry.   Neurological:      General: No focal deficit present.      Mental Status: He is alert and oriented to person, place, and time.     Results Review  I reviewed the patient's new clinical results.  Results from last 7 days   Lab Units 11/10/22  4145  11/09/22  1350   WBC 10*3/mm3 10.34 6.85   HEMOGLOBIN g/dL 15.1 14.8   PLATELETS 10*3/mm3 196 199     Results from last 7 days   Lab Units 11/11/22  0815 11/10/22  0548 11/09/22  1223   SODIUM mmol/L 136 138 136   POTASSIUM mmol/L 3.8 4.4 4.3   CHLORIDE mmol/L 101 101 104   CO2 mmol/L 24.6 25.4 20.6*   BUN mg/dL 8 6 6   CREATININE mg/dL 0.68* 0.81 0.75*   GLUCOSE mg/dL 90 96 124*     Lab Results   Component Value Date    ANIONGAP 10.4 11/11/2022     Estimated Creatinine Clearance: 152 mL/min (A) (by C-G formula based on SCr of 0.68 mg/dL (L)).    Results from last 7 days   Lab Units 11/10/22  0548 11/09/22  1223   ALBUMIN g/dL 3.90 3.90   BILIRUBIN mg/dL 0.9 0.3   ALK PHOS U/L 106 124*   AST (SGOT) U/L 45* 50*   ALT (SGPT) U/L 30 35     Results from last 7 days   Lab Units 11/11/22  0815 11/10/22  0548 11/09/22  1223   CALCIUM mg/dL 9.6 9.3 8.9   ALBUMIN g/dL  --  3.90 3.90   MAGNESIUM mg/dL  --  1.9 2.3       Hemoglobin A1C   Date/Time Value Ref Range Status   11/10/2022 0548 5.40 4.80 - 5.60 % Final       No radiology results for the last day    Scheduled Meds  aspirin, 81 mg, Oral, Daily  atorvastatin, 80 mg, Oral, Nightly  famotidine, 40 mg, Oral, Daily  folic acid, 1 mg, Oral, Daily  gabapentin, 100 mg, Oral, Q12H  lisinopril, 10 mg, Oral, Q24H  metoprolol succinate XL, 50 mg, Oral, Q24H  multivitamin, 1 tablet, Oral, Daily  nicotine, 1 patch, Transdermal, Q24H  sodium chloride, 10 mL, Intravenous, Q12H  thiamine, 100 mg, Oral, Daily    Continuous Infusions   PRN Meds  •  acetaminophen  •  LORazepam **OR** LORazepam **OR** LORazepam **OR** LORazepam **OR** LORazepam **OR** LORazepam  •  ondansetron **OR** ondansetron  •  senna-docusate sodium  •  sodium chloride  •  sodium chloride  •  traMADol     Diet  Diet Regular; Cardiac    I have personally reviewed:  [x]  Laboratory   []  Microbiology   [x]  Radiology   [x]  EKG/Telemetry   []  Cardiology/Vascular   []  Pathology   [x]  Records    CIWA (last 2 days)      Date/Time CIWA-Ar Score    11/11/22 1100 8    11/11/22 0830 1    11/10/22 2100 2    11/10/22 1600 15    11/10/22 1430 1    11/10/22 0830 1    11/09/22 2103 1    11/09/22 2034 0    11/09/22 1758 15    11/09/22 1700 18        Results for orders placed during the hospital encounter of 11/09/22    Adult Transthoracic Echo Complete W/ Cont if Necessary Per Protocol    Interpretation Summary  •  Left ventricular ejection fraction appears to be 36 - 40%.  •  Left ventricular diastolic function is consistent with (grade I) impaired relaxation.  •  Trace to mild mitral valve regurgitation is present.  •  Trace to mild tricuspid valve regurgitation is present.  •  Estimated right ventricular systolic pressure from tricuspid regurgitation is normal (<35 mmHg).        Assessment/Plan     Active Hospital Problems    Diagnosis  POA   • **Alcohol withdrawal syndrome without complication (HCC) [F10.930]  Yes   • Chest pain [R07.9]  Yes   • Acute chest pain [R07.9]  Yes      Resolved Hospital Problems   No resolved problems to display.     46 y.o. male admitted with alcohol withdrawal and chest pain    Alcohol withdrawal, actively withdrawing  -Received phenobarbital in ED  -Most recent CIWA scores improved, but still requiring Ativan  -As needed Ativan  -Continue folic acid and thiamine    Chest pain, pleuritic pain, history of nonischemic cardiomyopathy, HTN  HLD  -EF 18% May 2022, repeat echo here with EF of 36 to 46%.  -Dimer negative  -Cardiology consulted-normal stress test in July.  No plans for ischemic evaluation at this time.  -Lisinopril, atorvastatin, aspirin, metoprolol    GERD-continue home Pepcid    SCDs for DVT prophylaxis    Discussed with patient, nursing staff, CCP and care team on multidisciplinary rounds    Discharge: TBD probably 1 to 2 days    Zoltan Franks MD  [facility]  11/11/22

## 2022-11-11 NOTE — CASE MANAGEMENT/SOCIAL WORK
Continued Stay Note  [facility]     Patient Name: Bogdan Jeter  MRN: [id number]  Today's Date: 11/11/2022    Admit Date: 11/9/2022    Plan: Await medical readiness; plan home   Discharge Plan     Row Name 11/11/22 1401       Plan    Plan Await medical readiness; plan home    Patient/Family in Agreement with Plan other (see comments)    Plan Comments Attempted to follow up with pt at bedside, pt sleeping. CIWA's elevated, pt just recently given Ativan. ACCESS consulted. Pt has hx of going to [facility] in the past. CCP to follow for dc needs once medically ready               Discharge Codes    No documentation.               Expected Discharge Date and Time     Expected Discharge Date Expected Discharge Time    Nov 12, 2022             Dian Rodriguez RN

## 2022-11-11 NOTE — NURSING NOTE
"[facility] consulted regarding alcohol use.  Patient seen by [facility] earlier this year for same.  He was admitted 11/9 with complaints of chest pain and alcohol withdrawal.    Upon entering room, he is awake, RIB.  After introduction of self and role, he stated \"I think I'll pass on that\".  When ask why, he stated he's \"not into going to all those meetings\".  Asked if he would like resources for LIU tx or mental health, he stated \"I know where they're at\".  He was encouraged to inform his RN if he were to change his mind and want to talk further; he shook head in agreement.  Denied SI and HI.      [facility] will sign off, call if patient request.  RN informed.       "

## 2022-11-11 NOTE — PROGRESS NOTES
"   LOS: 2 days   Patient Care Team:  Provider, No Known as PCP - General    Chief Complaint: CMP     Interval History: Sleeping, hard to rouse. No events from a cardiac standpoint.       Objective   Vital Signs  Temp:  [98.3 °F (36.8 °C)-98.6 °F (37 °C)] 98.5 °F (36.9 °C)  Heart Rate:  [] 108  Resp:  [16-18] 18  BP: (106-152)/(64-92) 106/64  No intake or output data in the 24 hours ending 11/11/22 1451    Last Weight and Admission Weight        11/10/22  1104   Weight: 80 kg (176 lb 5.9 oz)     Flowsheet Rows    Flowsheet Row First Filed Value   Admission Height 180.3 cm (71\") Documented at 11/09/2022 1541   Admission Weight 78.5 kg (173 lb) Documented at 11/09/2022 1541                  Physical Exam  Constitutional:       Comments: sleeping   HENT:      Head:      Comments: Numerous tissue lesions on face     Nose: Nose normal.   Cardiovascular:      Rate and Rhythm: Normal rate and regular rhythm.      Pulses: Normal pulses.      Heart sounds: Normal heart sounds.   Pulmonary:      Effort: Pulmonary effort is normal.      Breath sounds: Normal breath sounds.   Abdominal:      Palpations: Abdomen is soft.   Musculoskeletal:         General: No swelling.         Results Review:      Results from last 7 days   Lab Units 11/11/22  0815 11/10/22  0548 11/09/22  1223   SODIUM mmol/L 136 138 136   POTASSIUM mmol/L 3.8 4.4 4.3   CHLORIDE mmol/L 101 101 104   CO2 mmol/L 24.6 25.4 20.6*   BUN mg/dL 8 6 6   CREATININE mg/dL 0.68* 0.81 0.75*   GLUCOSE mg/dL 90 96 124*   CALCIUM mg/dL 9.6 9.3 8.9     Results from last 7 days   Lab Units 11/10/22  0548 11/09/22  1350 11/09/22  1223   TROPONIN T ng/mL 0.145* 0.112* 0.098*     Results from last 7 days   Lab Units 11/10/22  0548 11/09/22  1350   WBC 10*3/mm3 10.34 6.85   HEMOGLOBIN g/dL 15.1 14.8   HEMATOCRIT % 43.7 43.0   PLATELETS 10*3/mm3 196 199     Results from last 7 days   Lab Units 11/09/22  1223   INR  0.86*   APTT seconds 29.0     Results from last 7 days   Lab " Units 11/10/22  0548   CHOLESTEROL mg/dL 197     Results from last 7 days   Lab Units 11/10/22  0548   MAGNESIUM mg/dL 1.9     Results from last 7 days   Lab Units 11/10/22  0548   CHOLESTEROL mg/dL 197   TRIGLYCERIDES mg/dL 63   HDL CHOL mg/dL 71*   LDL CHOL mg/dL 114*       I reviewed the patient's new clinical results.  I personally viewed and interpreted the patient's EKG/Telemetry data        Medication Review:   aspirin, 81 mg, Oral, Daily  atorvastatin, 80 mg, Oral, Nightly  famotidine, 40 mg, Oral, Daily  folic acid, 1 mg, Oral, Daily  gabapentin, 100 mg, Oral, Q12H  lisinopril, 10 mg, Oral, Q24H  metoprolol succinate XL, 50 mg, Oral, Q24H  multivitamin, 1 tablet, Oral, Daily  nicotine, 1 patch, Transdermal, Q24H  sodium chloride, 10 mL, Intravenous, Q12H  thiamine, 100 mg, Oral, Daily             Assessment & Plan     1. History of NICM, likely due to ETOH -- EF improved from 15-20% to 35-40%. Continue ACE/BB. Patient is euvolemic.    2. Chest pain -- recent normal stress, normal Tn. EF improving.     No further workup, will see as needed.       Kishore Garcia MD  11/11/22  14:51 EST

## 2022-11-12 ENCOUNTER — READMISSION MANAGEMENT (OUTPATIENT)
Dept: CALL CENTER | Facility: HOSPITAL | Age: 47
End: 2022-11-12

## 2022-11-12 VITALS
SYSTOLIC BLOOD PRESSURE: 107 MMHG | RESPIRATION RATE: 18 BRPM | HEART RATE: 86 BPM | WEIGHT: 176.37 LBS | DIASTOLIC BLOOD PRESSURE: 74 MMHG | HEIGHT: 71 IN | BODY MASS INDEX: 24.69 KG/M2 | TEMPERATURE: 98.3 F | OXYGEN SATURATION: 94 %

## 2022-11-12 PROBLEM — I50.22 CHRONIC HFREF (HEART FAILURE WITH REDUCED EJECTION FRACTION): Status: ACTIVE | Noted: 2022-11-12

## 2022-11-12 LAB
ANION GAP SERPL CALCULATED.3IONS-SCNC: 12.1 MMOL/L (ref 5–15)
BUN SERPL-MCNC: 15 MG/DL (ref 6–20)
BUN/CREAT SERPL: 19.5 (ref 7–25)
CALCIUM SPEC-SCNC: 9.7 MG/DL (ref 8.6–10.5)
CHLORIDE SERPL-SCNC: 100 MMOL/L (ref 98–107)
CO2 SERPL-SCNC: 23.9 MMOL/L (ref 22–29)
CREAT SERPL-MCNC: 0.77 MG/DL (ref 0.76–1.27)
EGFRCR SERPLBLD CKD-EPI 2021: 111.1 ML/MIN/1.73
GLUCOSE SERPL-MCNC: 96 MG/DL (ref 65–99)
MAGNESIUM SERPL-MCNC: 2 MG/DL (ref 1.6–2.6)
PHOSPHATE SERPL-MCNC: 3.9 MG/DL (ref 2.5–4.5)
POTASSIUM SERPL-SCNC: 4.3 MMOL/L (ref 3.5–5.2)
QT INTERVAL: 372 MS
SODIUM SERPL-SCNC: 136 MMOL/L (ref 136–145)

## 2022-11-12 PROCEDURE — G0378 HOSPITAL OBSERVATION PER HR: HCPCS

## 2022-11-12 PROCEDURE — 83735 ASSAY OF MAGNESIUM: CPT | Performed by: STUDENT IN AN ORGANIZED HEALTH CARE EDUCATION/TRAINING PROGRAM

## 2022-11-12 PROCEDURE — 80048 BASIC METABOLIC PNL TOTAL CA: CPT | Performed by: STUDENT IN AN ORGANIZED HEALTH CARE EDUCATION/TRAINING PROGRAM

## 2022-11-12 PROCEDURE — 84100 ASSAY OF PHOSPHORUS: CPT | Performed by: STUDENT IN AN ORGANIZED HEALTH CARE EDUCATION/TRAINING PROGRAM

## 2022-11-12 RX ORDER — METOPROLOL SUCCINATE 50 MG/1
50 TABLET, EXTENDED RELEASE ORAL
Qty: 30 TABLET | Refills: 0 | Status: SHIPPED | OUTPATIENT
Start: 2022-11-12 | End: 2022-11-12 | Stop reason: SDUPTHER

## 2022-11-12 RX ORDER — GABAPENTIN 100 MG/1
100 CAPSULE ORAL EVERY 12 HOURS SCHEDULED
Qty: 6 CAPSULE | Refills: 0 | Status: ON HOLD | OUTPATIENT
Start: 2022-11-12 | End: 2023-01-05

## 2022-11-12 RX ORDER — NICOTINE 21 MG/24HR
1 PATCH, TRANSDERMAL 24 HOURS TRANSDERMAL
Qty: 21 PATCH | Refills: 0 | Status: SHIPPED | OUTPATIENT
Start: 2022-11-12 | End: 2022-12-03

## 2022-11-12 RX ORDER — ATORVASTATIN CALCIUM 20 MG/1
40 TABLET, FILM COATED ORAL DAILY
Qty: 60 TABLET | Refills: 0 | Status: SHIPPED | OUTPATIENT
Start: 2022-11-12 | End: 2022-12-12

## 2022-11-12 RX ORDER — METOPROLOL SUCCINATE 50 MG/1
50 TABLET, EXTENDED RELEASE ORAL
Qty: 30 TABLET | Refills: 0 | Status: ON HOLD | OUTPATIENT
Start: 2022-11-12 | End: 2023-01-05 | Stop reason: SDUPTHER

## 2022-11-12 RX ORDER — LISINOPRIL 10 MG/1
10 TABLET ORAL
Qty: 30 TABLET | Refills: 0 | Status: SHIPPED | OUTPATIENT
Start: 2022-11-13 | End: 2023-03-13 | Stop reason: SDUPTHER

## 2022-11-12 RX ORDER — FOLIC ACID 1 MG/1
1 TABLET ORAL DAILY
Start: 2022-11-12 | End: 2022-12-12

## 2022-11-12 RX ADMIN — ASPIRIN 81 MG: 81 TABLET, COATED ORAL at 09:52

## 2022-11-12 RX ADMIN — FAMOTIDINE 40 MG: 20 TABLET ORAL at 09:52

## 2022-11-12 RX ADMIN — METOPROLOL SUCCINATE 50 MG: 50 TABLET, FILM COATED, EXTENDED RELEASE ORAL at 09:52

## 2022-11-12 RX ADMIN — Medication 10 ML: at 09:53

## 2022-11-12 RX ADMIN — GABAPENTIN 100 MG: 100 CAPSULE ORAL at 09:52

## 2022-11-12 RX ADMIN — FOLIC ACID 1 MG: 1 TABLET ORAL at 09:52

## 2022-11-12 RX ADMIN — LISINOPRIL 10 MG: 10 TABLET ORAL at 09:52

## 2022-11-12 RX ADMIN — NICOTINE 1 PATCH: 21 PATCH, EXTENDED RELEASE TRANSDERMAL at 09:52

## 2022-11-12 RX ADMIN — Medication 100 MG: at 09:52

## 2022-11-12 RX ADMIN — Medication 1 TABLET: at 09:52

## 2022-11-12 NOTE — DISCHARGE SUMMARY
Patient Name: Bogdan Jeter  : 1975  MRN: [id number]    Date of Admission: 2022  Date of Discharge:  2022  Primary Care Physician: Provider, No Known      Chief Complaint:   Chest Pain      Discharge Diagnoses     Active Hospital Problems    Diagnosis  POA   • **Alcohol withdrawal syndrome without complication (HCC) [F10.930]  Yes   • Chronic HFrEF (heart failure with reduced ejection fraction) (HCC) [I50.22]  Unknown   • Chest pain [R07.9]  Yes   • Acute chest pain [R07.9]  Yes      Resolved Hospital Problems   No resolved problems to display.        Hospital Course     Mr. Jeter is a 46 y.o. male with a history of nonischemic chronic HFrEF, alcohol use disorder, hypertension presenting with chest pain complicated by alcohol withdrawal.  Cardiology evaluated patient.  Echo showed an improved ejection fraction to 35- 40% from his previous 15 to 20%.  He had a recent stress that was normal, no further work-up was indicated.  Patient required CIWA protocol and as needed Ativan to get through his alcohol withdrawal.  Patient declined alcohol cessation materials.    Home medications were refilled for 1 month.  Patient given materials to find a PCP.  As he has not seen his current PCP in some years.    At the time of discharge patient was told to take all medications as prescribed, keep all follow-up appointments, and call their doctor or return to the hospital with any worsening or concerning symptoms.    Day of Discharge     Subjective:  Patient lying comfortable in bed.  Patient tolerating food well.  Patient states that he no longer feels like he is withdrawing from alcohol.    Review of Systems   Constitutional: Negative for chills and fever.   Respiratory: Negative for cough and shortness of breath.    Cardiovascular: Negative for chest pain and palpitations.   Gastrointestinal: Negative for abdominal pain, diarrhea, nausea and vomiting.   Neurological: Negative for headaches.        Physical Exam:  Temp:  [98.3 °F (36.8 °C)-98.5 °F (36.9 °C)] 98.3 °F (36.8 °C)  Heart Rate:  [86-98] 86  Resp:  [18] 18  BP: (107-118)/(74-81) 107/74  Body mass index is 24.69 kg/m².  Physical Exam  Constitutional:       General: He is not in acute distress.     Appearance: Normal appearance. He is not toxic-appearing.   HENT:      Head: Normocephalic and atraumatic.   Cardiovascular:      Rate and Rhythm: Regular rhythm. Normal rate present.   Pulmonary:      Effort: Pulmonary effort is normal. No respiratory distress.      Breath sounds: Normal breath sounds. No wheezing, rhonchi or rales.   Abdominal:      General: Bowel sounds are normal.      Palpations: Abdomen is soft.      Tenderness: There is no abdominal tenderness. There is no guarding or rebound.   Musculoskeletal:         General: No swelling.      Right lower leg: No edema.      Left lower leg: No edema.   Skin:     General: Skin is warm and dry.   Neurological:      General: No focal deficit present.      Mental Status: He is alert and oriented to person, place, and time.    Results Review  Consultants     Consult Orders (all) (From admission, onward)     Start     Ordered    11/11/22 1354  Inpatient [facility] Consult  Once        Provider:  (Not yet assigned)    11/11/22 1353    11/09/22 1715  Inpatient Hospitalist Consult  Once        Specialty:  Hospitalist  Provider:  Rogerio Arevalo MD    11/09/22 1714    11/09/22 1709  Inpatient Cardiology Consult  Once        Specialty:  Cardiology  Provider:  Narinder Edwards MD    11/09/22 1708              Procedures     Imaging Results (All)     Procedure Component Value Units Date/Time    XR Chest 2 View [[id number]] Collected: 11/09/22 1344     Updated: 11/09/22 1348    Narrative:      PA AND LATERAL CHEST X-RAY     HISTORY: Chest pain.     Chest x-ray consisting of three images is provided. Correlation: Chest  x-ray 05/14/2022.     FINDINGS: The cardiomediastinal silhouette is normal. The  lungs are  clear. The costophrenic sulci are dry and the bones appear normal. There  is no pneumothorax.       Impression:      Negative.     This report was finalized on 11/9/2022 1:45 PM by Dr. Kehinde Dudley M.D.             Pertinent Labs     Results from last 7 days   Lab Units 11/10/22  0548 11/09/22  1350   WBC 10*3/mm3 10.34 6.85   HEMOGLOBIN g/dL 15.1 14.8   PLATELETS 10*3/mm3 196 199     Results from last 7 days   Lab Units 11/12/22  0553 11/11/22  0815 11/10/22  0548 11/09/22  1223   SODIUM mmol/L 136 136 138 136   POTASSIUM mmol/L 4.3 3.8 4.4 4.3   CHLORIDE mmol/L 100 101 101 104   CO2 mmol/L 23.9 24.6 25.4 20.6*   BUN mg/dL 15 8 6 6   CREATININE mg/dL 0.77 0.68* 0.81 0.75*   GLUCOSE mg/dL 96 90 96 124*   Estimated Creatinine Clearance: 134.2 mL/min (by C-G formula based on SCr of 0.77 mg/dL).  Results from last 7 days   Lab Units 11/10/22  0548 11/09/22  1223   ALBUMIN g/dL 3.90 3.90   BILIRUBIN mg/dL 0.9 0.3   ALK PHOS U/L 106 124*   AST (SGOT) U/L 45* 50*   ALT (SGPT) U/L 30 35     Results from last 7 days   Lab Units 11/12/22  0553 11/11/22  0815 11/10/22  0548 11/09/22  1223   CALCIUM mg/dL 9.7 9.6 9.3 8.9   ALBUMIN g/dL  --   --  3.90 3.90   MAGNESIUM mg/dL 2.0  --  1.9 2.3   PHOSPHORUS mg/dL 3.9  --   --   --        Results from last 7 days   Lab Units 11/10/22  0548 11/09/22  1350 11/09/22  1223   TROPONIN T ng/mL 0.145* 0.112* 0.098*   PROBNP pg/mL  --  58.7  --    D DIMER QUANT MCGFEU/mL <0.27  --   --        Results from last 7 days   Lab Units 11/10/22  0548   CHOLESTEROL mg/dL 197   TRIGLYCERIDES mg/dL 63   HDL CHOL mg/dL 71*   LDL CHOL mg/dL 114*           Test Results Pending at Discharge       Discharge Details        Discharge Medications      New Medications      Instructions Start Date   atorvastatin 20 MG tablet  Commonly known as: LIPITOR   Take 2 tablets by mouth Daily.      lisinopril 10 MG tablet  Commonly known as: PRINIVIL,ZESTRIL  Replaces: ramipril 1.25 MG capsule   10 mg,  Oral, Every 24 Hours Scheduled   Start Date: November 13, 2022        Changes to Medications      Instructions Start Date   lidocaine 5 %  Commonly known as: LIDODERM  What changed: additional instructions   2 patches, Transdermal, Every 24 Hours Scheduled, Remove & Discard patch within 12 hours or as directed by MD         Continue These Medications      Instructions Start Date   folic acid 1 MG tablet  Commonly known as: FOLVITE   1 mg, Oral, Daily      gabapentin 100 MG capsule  Commonly known as: NEURONTIN   100 mg, Oral, Every 12 Hours Scheduled      metoprolol succinate XL 50 MG 24 hr tablet  Commonly known as: TOPROL-XL   50 mg, Oral, Every 24 Hours Scheduled      nicotine 21 MG/24HR patch  Commonly known as: NICODERM CQ   1 patch, Transdermal, Every 24 Hours Scheduled      thiamine 100 MG tablet  tablet  Commonly known as: VITAMIN B-1   100 mg, Oral, Daily         Stop These Medications    ramipril 1.25 MG capsule  Commonly known as: ALTACE  Replaced by: lisinopril 10 MG tablet            No Known Allergies      Discharge Disposition:  Home or Self Care    Discharge Diet:  Diet Order   Procedures   • Diet Regular; Cardiac       Discharge Activity:   As tolerated    CODE STATUS:    Code Status and Medical Interventions:   Ordered at: 11/09/22 1708     Level Of Support Discussed With:    Patient     Code Status (Patient has no pulse and is not breathing):    CPR (Attempt to Resuscitate)     Medical Interventions (Patient has pulse or is breathing):    Full Support       Future Appointments   Date Time Provider Department Center   11/23/2022 11:00 AM Margret Johansen APRN [facility]      Follow-up Information     Provider, No Known Follow up.    Contact information:  [facility] [address] [phone]                         Time Spent on Discharge:  Greater than 30 minutes      Zoltan Franks MD  [facility]  11/12/22  14:02 EST

## 2022-11-12 NOTE — PLAN OF CARE
Problem: Adult Inpatient Plan of Care  Goal: Plan of Care Review  Outcome: Ongoing, Progressing  Flowsheets (Taken 11/12/2022 0529)  Outcome Evaluation: Pt kept trying to leave the unit stating he had to go to his car. He was reminded that he had to stay on the unit until he was dc'd, he stated that he needed to go out to smoke and he was also told that this is a non-smoking campus, CIWA was 13 and med was given, which was effective. Pt stated that he plans on being dc'd later today. Will CTM.   Goal Outcome Evaluation:              Outcome Evaluation: Pt kept trying to leave the unit stating he had to go to his car. He was reminded that he had to stay on the unit until he was dc'd, he stated that he needed to go out to smoke and he was also told that this is a non-smoking campus, CIWA was 13 and med was given, which was effective. Pt stated that he plans on being dc'd later today. Will CTM.

## 2022-11-13 NOTE — OUTREACH NOTE
Prep Survey    Flowsheet Row Responses   [facility] facility patient discharged from? [facility]   Is LACE score < 7 ? No   Emergency Room discharge w/ pulse ox? No   Eligibility Not Eligible   What are the reasons patient is not eligible? Other   Does the patient have one of the following disease processes/diagnoses(primary or secondary)? Other   Prep survey completed? Yes          BEKAH TAYLOR - Registered Nurse

## 2022-11-14 NOTE — CASE MANAGEMENT/SOCIAL WORK
Case Management Discharge Note      Final Note: home no additional dc orders noted. luis angel corrales/ccp    Provided Post Acute Provider List?: N/A  Provided Post Acute Provider Quality & Resource List?: N/A    Selected Continued Care - Discharged on 11/12/2022 Admission date: 11/9/2022 - Discharge disposition: Home or Self Care    Destination    No services have been selected for the patient.              Durable Medical Equipment    No services have been selected for the patient.              Dialysis/Infusion    No services have been selected for the patient.              Home Medical Care    No services have been selected for the patient.              Therapy    No services have been selected for the patient.              Community Resources    No services have been selected for the patient.              Community & DME    No services have been selected for the patient.                  Transportation Services  Private: Car    Final Discharge Disposition Code: 01 - home or self-care

## 2022-11-16 NOTE — CASE MANAGEMENT/SOCIAL WORK
Case Management Discharge Note      Final Note: HOME NO ADDITIONAL DC ORDERS NOTED. FRANCINE BUSTOS/CCP    Provided Post Acute Provider List?: N/A  Provided Post Acute Provider Quality & Resource List?: N/A    Selected Continued Care - Discharged on 11/12/2022 Admission date: 11/9/2022 - Discharge disposition: Home or Self Care    Destination    No services have been selected for the patient.              Durable Medical Equipment    No services have been selected for the patient.              Dialysis/Infusion    No services have been selected for the patient.              Home Medical Care    No services have been selected for the patient.              Therapy    No services have been selected for the patient.              Community Resources    No services have been selected for the patient.              Community & DME    No services have been selected for the patient.                  Transportation Services  Private: Car    Final Discharge Disposition Code: 01 - home or self-care

## 2022-11-16 NOTE — PAYOR COMM NOTE
"Bogdan Pearson (46 y.o. Male)   PLEASE SEE ATTACHED FOR DC NOTICE    REF #    [id number]    THANK YOU  ESTHELA ARRIAGA RN/[facility]  [phone]  [phone]      Date of Birth   1975    Social Security Number       Address   [address] [address]    Home Phone   [phone]    MRN   [id number]       Nondenominational   None    Marital Status   Single                            Admission Date   22    Admission Type   Emergency    Admitting Provider   Rogerio Arevalo MD    Attending Provider       Department, Room/Bed   [address], [address]/1       Discharge Date   2022    Discharge Disposition   Home or Self Care    Discharge Destination                               Attending Provider: (none)   Allergies: No Known Allergies    Isolation: None   Infection: None   Code Status: Prior    Ht: 180 cm (70.87\")   Wt: 80 kg (176 lb 5.9 oz)    Admission Cmt: None   Principal Problem: Alcohol withdrawal syndrome without complication (HCC) [F10.930]                 Active Insurance as of 2022     Primary Coverage     Payor Plan Insurance Group Employer/Plan Group    [facility] COMMUNITY PLAN [facility] COMMUNITY PLAN [facility]     Payor Plan Address Payor Plan Phone Number Payor Plan Fax Number Effective Dates    [address]   10/1/2022 - None Entered    [facility] [phone]       Subscriber Name Subscriber Birth Date Member ID       BOGDAN PEARSON 1975 [id number]                 Emergency Contacts      (Rel.) Home Phone Work Phone Mobile Phone    WEBSTERSHEILA ARRINGTONELL (Father) [phone] -- [phone]    Betsy Cha (Sister) -- -- [phone]            [place]: NPI [id number]  Tax ID [id number]     Discharge Summary      Zoltan Franks MD at 22 1338              Patient Name: Bogdan Pearson  : 1975  MRN: [id number]    Date of Admission: 2022  Date of Discharge:  2022  Primary Care Physician: Provider, No " Known      Chief Complaint:   Chest Pain      Discharge Diagnoses     Active Hospital Problems    Diagnosis  POA   • **Alcohol withdrawal syndrome without complication ([facility]) [F10.930]  Yes   • Chronic HFrEF (heart failure with reduced ejection fraction) ([facility]) [I50.22]  Unknown   • Chest pain [R07.9]  Yes   • Acute chest pain [R07.9]  Yes      Resolved Hospital Problems   No resolved problems to display.        Hospital Course     Mr. Jeter is a 46 y.o. male with a history of nonischemic chronic HFrEF, alcohol use disorder, hypertension presenting with chest pain complicated by alcohol withdrawal.  Cardiology evaluated patient.  Echo showed an improved ejection fraction to 35- 40% from his previous 15 to 20%.  He had a recent stress that was normal, no further work-up was indicated.  Patient required CIWA protocol and as needed Ativan to get through his alcohol withdrawal.  Patient declined alcohol cessation materials.    Home medications were refilled for 1 month.  Patient given materials to find a PCP.  As he has not seen his current PCP in some years.    At the time of discharge patient was told to take all medications as prescribed, keep all follow-up appointments, and call their doctor or return to the hospital with any worsening or concerning symptoms.    Day of Discharge     Subjective:  Patient lying comfortable in bed.  Patient tolerating food well.  Patient states that he no longer feels like he is withdrawing from alcohol.    Review of Systems   Constitutional: Negative for chills and fever.   Respiratory: Negative for cough and shortness of breath.    Cardiovascular: Negative for chest pain and palpitations.   Gastrointestinal: Negative for abdominal pain, diarrhea, nausea and vomiting.   Neurological: Negative for headaches.       Physical Exam:  Temp:  [98.3 °F (36.8 °C)-98.5 °F (36.9 °C)] 98.3 °F (36.8 °C)  Heart Rate:  [86-98] 86  Resp:  [18] 18  BP: (107-118)/(74-81) 107/74  Body mass index is  24.69 kg/m².  Physical Exam  Constitutional:       General: He is not in acute distress.     Appearance: Normal appearance. He is not toxic-appearing.   HENT:      Head: Normocephalic and atraumatic.   Cardiovascular:      Rate and Rhythm: Regular rhythm. Normal rate present.   Pulmonary:      Effort: Pulmonary effort is normal. No respiratory distress.      Breath sounds: Normal breath sounds. No wheezing, rhonchi or rales.   Abdominal:      General: Bowel sounds are normal.      Palpations: Abdomen is soft.      Tenderness: There is no abdominal tenderness. There is no guarding or rebound.   Musculoskeletal:         General: No swelling.      Right lower leg: No edema.      Left lower leg: No edema.   Skin:     General: Skin is warm and dry.   Neurological:      General: No focal deficit present.      Mental Status: He is alert and oriented to person, place, and time.    Results Review  Consultants     Consult Orders (all) (From admission, onward)     Start     Ordered    11/11/22 1354  Inpatient Access Center Consult  Once        Provider:  (Not yet assigned)    11/11/22 1353    11/09/22 1715  Inpatient Hospitalist Consult  Once        Specialty:  Hospitalist  Provider:  Rogerio Arevalo MD    11/09/22 1714    11/09/22 1709  Inpatient Cardiology Consult  Once        Specialty:  Cardiology  Provider:  Narinder Edwards MD    11/09/22 1708              Procedures     Imaging Results (All)     Procedure Component Value Units Date/Time    XR Chest 2 View [[id number]] Collected: 11/09/22 1344     Updated: 11/09/22 1348    Narrative:      PA AND LATERAL CHEST X-RAY     HISTORY: Chest pain.     Chest x-ray consisting of three images is provided. Correlation: Chest  x-ray 05/14/2022.     FINDINGS: The cardiomediastinal silhouette is normal. The lungs are  clear. The costophrenic sulci are dry and the bones appear normal. There  is no pneumothorax.       Impression:      Negative.     This report was finalized on  11/9/2022 1:45 PM by Dr. Kehinde Dudley M.D.             Pertinent Labs     Results from last 7 days   Lab Units 11/10/22  0548 11/09/22  1350   WBC 10*3/mm3 10.34 6.85   HEMOGLOBIN g/dL 15.1 14.8   PLATELETS 10*3/mm3 196 199     Results from last 7 days   Lab Units 11/12/22  0553 11/11/22  0815 11/10/22  0548 11/09/22  1223   SODIUM mmol/L 136 136 138 136   POTASSIUM mmol/L 4.3 3.8 4.4 4.3   CHLORIDE mmol/L 100 101 101 104   CO2 mmol/L 23.9 24.6 25.4 20.6*   BUN mg/dL 15 8 6 6   CREATININE mg/dL 0.77 0.68* 0.81 0.75*   GLUCOSE mg/dL 96 90 96 124*   Estimated Creatinine Clearance: 134.2 mL/min (by C-G formula based on SCr of 0.77 mg/dL).  Results from last 7 days   Lab Units 11/10/22  0548 11/09/22  1223   ALBUMIN g/dL 3.90 3.90   BILIRUBIN mg/dL 0.9 0.3   ALK PHOS U/L 106 124*   AST (SGOT) U/L 45* 50*   ALT (SGPT) U/L 30 35     Results from last 7 days   Lab Units 11/12/22  0553 11/11/22  0815 11/10/22  0548 11/09/22  1223   CALCIUM mg/dL 9.7 9.6 9.3 8.9   ALBUMIN g/dL  --   --  3.90 3.90   MAGNESIUM mg/dL 2.0  --  1.9 2.3   PHOSPHORUS mg/dL 3.9  --   --   --        Results from last 7 days   Lab Units 11/10/22  0548 11/09/22  1350 11/09/22  1223   TROPONIN T ng/mL 0.145* 0.112* 0.098*   PROBNP pg/mL  --  58.7  --    D DIMER QUANT MCGFEU/mL <0.27  --   --        Results from last 7 days   Lab Units 11/10/22  0548   CHOLESTEROL mg/dL 197   TRIGLYCERIDES mg/dL 63   HDL CHOL mg/dL 71*   LDL CHOL mg/dL 114*           Test Results Pending at Discharge       Discharge Details        Discharge Medications      New Medications      Instructions Start Date   atorvastatin 20 MG tablet  Commonly known as: LIPITOR   Take 2 tablets by mouth Daily.      lisinopril 10 MG tablet  Commonly known as: PRINIVIL,ZESTRIL  Replaces: ramipril 1.25 MG capsule   10 mg, Oral, Every 24 Hours Scheduled   Start Date: November 13, 2022        Changes to Medications      Instructions Start Date   lidocaine 5 %  Commonly known as: LIDODERM  What  changed: additional instructions   2 patches, Transdermal, Every 24 Hours Scheduled, Remove & Discard patch within 12 hours or as directed by MD         Continue These Medications      Instructions Start Date   folic acid 1 MG tablet  Commonly known as: FOLVITE   1 mg, Oral, Daily      gabapentin 100 MG capsule  Commonly known as: NEURONTIN   100 mg, Oral, Every 12 Hours Scheduled      metoprolol succinate XL 50 MG 24 hr tablet  Commonly known as: TOPROL-XL   50 mg, Oral, Every 24 Hours Scheduled      nicotine 21 MG/24HR patch  Commonly known as: NICODERM CQ   1 patch, Transdermal, Every 24 Hours Scheduled      thiamine 100 MG tablet  tablet  Commonly known as: VITAMIN B-1   100 mg, Oral, Daily         Stop These Medications    ramipril 1.25 MG capsule  Commonly known as: ALTACE  Replaced by: lisinopril 10 MG tablet            No Known Allergies      Discharge Disposition:  Home or Self Care    Discharge Diet:  Diet Order   Procedures   • Diet Regular; Cardiac       Discharge Activity:   As tolerated    CODE STATUS:    Code Status and Medical Interventions:   Ordered at: 11/09/22 5766     Level Of Support Discussed With:    Patient     Code Status (Patient has no pulse and is not breathing):    CPR (Attempt to Resuscitate)     Medical Interventions (Patient has pulse or is breathing):    Full Support       Future Appointments   Date Time Provider Department Center   11/23/2022 11:00 AM Margret Johansen APRN [facility]      Follow-up Information     Provider, No Known Follow up.    Contact information:  [facility] [address] [phone]                         Time Spent on Discharge:  Greater than 30 minutes      Zoltan Franks MD  [facility]  11/12/22  14:02 EST                Electronically signed by Zoltan Franks MD at 11/12/22 6260

## 2022-11-23 ENCOUNTER — OFFICE VISIT (OUTPATIENT)
Dept: CARDIOLOGY | Facility: CLINIC | Age: 47
End: 2022-11-23

## 2022-11-23 VITALS
DIASTOLIC BLOOD PRESSURE: 80 MMHG | HEART RATE: 108 BPM | BODY MASS INDEX: 25.62 KG/M2 | WEIGHT: 183 LBS | HEIGHT: 71 IN | SYSTOLIC BLOOD PRESSURE: 102 MMHG

## 2022-11-23 DIAGNOSIS — I50.22 CHRONIC SYSTOLIC HEART FAILURE: ICD-10-CM

## 2022-11-23 DIAGNOSIS — F10.288 ALCOHOL DEPENDENCE WITH OTHER ALCOHOL-INDUCED DISORDER: ICD-10-CM

## 2022-11-23 DIAGNOSIS — G47.33 OSA (OBSTRUCTIVE SLEEP APNEA): ICD-10-CM

## 2022-11-23 DIAGNOSIS — Z72.0 TOBACCO ABUSE: ICD-10-CM

## 2022-11-23 DIAGNOSIS — R00.0 SINUS TACHYCARDIA: Primary | ICD-10-CM

## 2022-11-23 PROCEDURE — 99214 OFFICE O/P EST MOD 30 MIN: CPT | Performed by: NURSE PRACTITIONER

## 2022-11-23 PROCEDURE — 93000 ELECTROCARDIOGRAM COMPLETE: CPT | Performed by: NURSE PRACTITIONER

## 2022-11-23 NOTE — PROGRESS NOTES
Subjective:     Encounter Date:11/23/2022      Patient ID: Bogdan Jeter is a 46 y.o. male.    Chief Complaint:follow up [facility]  History of Present Illness  This is a 47 y/o patient who follows with Dr. Villagomez and is new to me today. He has a pmhx of nonischemic cardiomyopathy, tobacco abuse and alcohol dependence. He was admitted in May 2022 for back pain and ETOH withdrawal. Cardiology began following for tachycardia. An echocardiogram was obtained that showed an EF of 18%. He was started on GDMT. At follow up with JESSE Liang in July, a nuclear stress test was ordered as he had not had an ischemic evaluation for his reduced heart function. Stress test showed an EF of 49% and no evidence of ischemia. He was then admitted on 11/9 for substernal chest pain. At that time, he was drinking about a pint of alcohol a day. He also admitted to noncompliance with taking his medications. An echocardiogram was obtained that showed improvement in his EF to about 35-40%. He was encouraged to be compliant with taking his medications.    He is here today for a hospital follow up visit. He continues to have chest pain and shortness of breath. He does not feel like it is any worse than what it has been. He denies any swelling, orthopnea or PND. He thinks he has sleep apnea but has not been officially diagnosed. He says his heart is racing all the time. He also has dizziness at time. He continues to smoke a pack per day.  He is not interested in Chantix or nicotine patch. He says he has slowed down with drinking and drinks 1-2 drinks of bourbon and coke at night. He works at [employer] currently. He says that he is compliant with taking his medications now.    I have reviewed and updated as appropriate allergies, current medications, past family history, past medical history, past surgical history and problem list.    Review of Systems   Constitutional: Positive for malaise/fatigue. Negative for fever, weight gain and  weight loss.   HENT: Negative for congestion, hoarse voice and sore throat.    Eyes: Negative for blurred vision and double vision.   Cardiovascular: Positive for chest pain, dyspnea on exertion and palpitations. Negative for leg swelling, orthopnea and syncope.   Respiratory: Positive for shortness of breath. Negative for cough and wheezing.    Gastrointestinal: Negative for abdominal pain, hematemesis, hematochezia and melena.   Genitourinary: Negative for dysuria and hematuria.   Neurological: Negative for dizziness, headaches, light-headedness and numbness.   Psychiatric/Behavioral: Negative for depression. The patient is not nervous/anxious.          Current Outpatient Medications:   •  lidocaine (LIDODERM) 5 %, Place 2 patches on the skin as directed by provider Daily. Remove & Discard patch within 12 hours or as directed by MD, Disp: , Rfl:   •  atorvastatin (LIPITOR) 20 MG tablet, Take 2 tablets by mouth Daily., Disp: 60 tablet, Rfl: 0  •  folic acid (FOLVITE) 1 MG tablet, Take 1 tablet by mouth Daily for 30 days., Disp: , Rfl:   •  gabapentin (NEURONTIN) 100 MG capsule, Take 1 capsule by mouth Every 12 (Twelve) Hours for 3 days., Disp: 6 capsule, Rfl: 0  •  lisinopril (PRINIVIL,ZESTRIL) 10 MG tablet, Take 1 tablet by mouth Daily for 30 days., Disp: 30 tablet, Rfl: 0  •  metoprolol succinate XL (TOPROL-XL) 50 MG 24 hr tablet, Take 1 tablet by mouth Daily for 30 days., Disp: 30 tablet, Rfl: 0  •  nicotine (NICODERM CQ) 21 MG/24HR patch, Place 1 patch on the skin as directed by provider Daily for 21 days., Disp: 21 patch, Rfl: 0  •  thiamine (VITAMIN B-1) 100 MG tablet  tablet, Take 1 tablet by mouth Daily., Disp: 30 tablet, Rfl: 0    No past medical history on file.    No past surgical history on file.    No family history on file.    Social History     Tobacco Use   • Smoking status: Every Day     Packs/day: 1.00     Years: 30.00     Pack years: 30.00     Types: Cigarettes   • Smokeless tobacco: Never  "  Vaping Use   • Vaping Use: Never used   Substance Use Topics   • Alcohol use: Yes     Alcohol/week: 110.0 standard drinks     Types: 5 Cans of beer, 105 Drinks containing 0.5 oz of alcohol per week   • Drug use: Yes     Frequency: 7.0 times per week     Types: Marijuana     Comment: daily use, last used 1 day ago.         ECG 12 Lead    Date/Time: 11/23/2022 12:12 PM  Performed by: Margret Johansen APRN  Authorized by: Margret Johansen APRN   Comparison: compared with previous ECG from 8/24/2022  Similar to previous ECG  Rhythm: sinus tachycardia  Comments: No significant change from previous EKG               Objective:     Visit Vitals  /80 (BP Location: Left arm, Patient Position: Sitting, Cuff Size: Adult)   Pulse 108   Ht 180 cm (70.87\")   Wt 83 kg (183 lb)   BMI 25.62 kg/m²             Physical Exam  Constitutional:       Appearance: Normal appearance. He is normal weight.   HENT:      Head: Normocephalic.   Neck:      Vascular: No carotid bruit.   Cardiovascular:      Rate and Rhythm: Normal rate and regular rhythm.      Chest Wall: PMI is not displaced.      Pulses: Normal pulses.           Radial pulses are 2+ on the right side and 2+ on the left side.        Posterior tibial pulses are 2+ on the right side and 2+ on the left side.      Heart sounds: Normal heart sounds. No murmur heard.    No friction rub. No gallop.   Pulmonary:      Effort: Pulmonary effort is normal.      Breath sounds: Normal breath sounds.   Abdominal:      General: Bowel sounds are normal. There is no distension.      Palpations: Abdomen is soft.   Musculoskeletal:      Right lower leg: No edema.      Left lower leg: No edema.   Skin:     General: Skin is warm and dry.      Capillary Refill: Capillary refill takes less than 2 seconds.   Neurological:      Mental Status: He is alert and oriented to person, place, and time.   Psychiatric:         Mood and Affect: Mood normal.         Behavior: Behavior normal.         Thought " Content: Thought content normal.          Lab Review:   Lipid Panel    Lipid Panel 11/10/22   Total Cholesterol 197   Triglycerides 63   HDL Cholesterol 71 (A)   VLDL Cholesterol 12   LDL Cholesterol  114 (A)   LDL/HDL Ratio 1.60   (A) Abnormal value                Cardiac Procedures:   1. Echocardiogram 11/10/22:  •  Left ventricular ejection fraction appears to be 36 - 40%.  •  Left ventricular diastolic function is consistent with (grade I) impaired relaxation.  •  Trace to mild mitral valve regurgitation is present.  •  Trace to mild tricuspid valve regurgitation is present.  •  Estimated right ventricular systolic pressure from tricuspid regurgitation is normal (<35 mmHg).  2. Lexiscan stress test 7/27/22:  • Diaphragmatic attenuation artifact is present.  • Left ventricular ejection fraction is normal. (Calculated EF = 49%).  • Myocardial perfusion imaging indicates a normal myocardial perfusion study with no evidence of ischemia.  • Impressions are consistent with a low risk study.     3. Echocardiogram 5/20/22:  • The left ventricular cavity is mildly dilated.  • Estimated left ventricular EF = 18% Left ventricular systolic function is severely decreased.  • Left ventricular diastolic function was normal.  • The following left ventricular wall segments are hypokinetic: mid anterior, apical anterior, basal anterolateral, mid anterolateral, apical lateral, basal inferolateral, mid inferolateral, apical inferior, mid inferior, apical septal, basal inferoseptal, mid inferoseptal, apex hypokinetic, mid anteroseptal, basal anterior, basal inferior and basal inferoseptal.      Assessment:         Diagnoses and all orders for this visit:    1. Sinus tachycardia (Primary)    2. Chronic systolic heart failure (CMS/HCC)    3. Alcohol dependence with other alcohol-induced disorder (HCC)    4. Tobacco abuse    5. MARITA (obstructive sleep apnea)  -     Ambulatory Referral to Sleep Medicine    Other orders  -     ECG 12  Lead            Plan:       1. NICM: appears euvolemic on exam today with no lower extremity edema, no JVD, clear lungs. He does report continued shortness of breath but not any worse than previously noted. I suspect this is in part due to his pack per day tobacco habit along with his lower heart function. I encouraged him to continue to take his medications as prescribed to hopefully continue to improve his heart function.   2. Chronic systolic heart failure: Toprol XL and lisinopril. Continue to optimize GDMT as tolerated  3. Tachycardia: his heart rate is mildly elevated. Unable to go up on beta blocker due to borderline hypotension. This is likely secondary to his ETOH abuse as well.   4. HLD: on atorvastatin. Will need to monitor LFTs in the setting of ETOH abuse  5. ETOH abuse: he says he has cut back to 2 drinks per day. Encouraged him to continue to try to limit his ETOH intake.  6. Tobacco abuse: Bogdan Jeter  reports that he has been smoking cigarettes. He has a 30.00 pack-year smoking history. He has never used smokeless tobacco.. I have educated him on the risk of diseases from using tobacco products such as cancer, COPD and heart disease.     I advised him to quit and he is not willing to quit.    I spent 3.5 minutes counseling the patient.       Thank you for allowing me to participate in this patient's care. Please call with any questions or concerns. Mr. Jeter will follow up with Dr. Villagomez in 3 months.          Your medication list          Accurate as of November 23, 2022 12:13 PM. If you have any questions, ask your nurse or doctor.            CONTINUE taking these medications      Instructions Last Dose Given Next Dose Due   atorvastatin 20 MG tablet  Commonly known as: LIPITOR      Take 2 tablets by mouth Daily.       folic acid 1 MG tablet  Commonly known as: FOLVITE      Take 1 tablet by mouth Daily for 30 days.       gabapentin 100 MG capsule  Commonly known as: NEURONTIN      Take 1 capsule  by mouth Every 12 (Twelve) Hours for 3 days.       lidocaine 5 %  Commonly known as: LIDODERM      Place 2 patches on the skin as directed by provider Daily. Remove & Discard patch within 12 hours or as directed by MD       lisinopril 10 MG tablet  Commonly known as: PRINIVIL,ZESTRIL      Take 1 tablet by mouth Daily for 30 days.       metoprolol succinate XL 50 MG 24 hr tablet  Commonly known as: TOPROL-XL      Take 1 tablet by mouth Daily for 30 days.       nicotine 21 MG/24HR patch  Commonly known as: NICODERM CQ      Place 1 patch on the skin as directed by provider Daily for 21 days.       thiamine 100 MG tablet  tablet  Commonly known as: VITAMIN B-1      Take 1 tablet by mouth Daily.                Margret Johansen, JESSE  11/23/22  10:32 AM EST

## 2022-11-30 ENCOUNTER — HOSPITAL ENCOUNTER (EMERGENCY)
Facility: HOSPITAL | Age: 47
Discharge: LEFT BEFORE DISCHARGE | End: 2022-11-30

## 2022-11-30 ENCOUNTER — APPOINTMENT (OUTPATIENT)
Dept: GENERAL RADIOLOGY | Facility: HOSPITAL | Age: 47
End: 2022-11-30

## 2022-11-30 VITALS
DIASTOLIC BLOOD PRESSURE: 85 MMHG | OXYGEN SATURATION: 98 % | RESPIRATION RATE: 18 BRPM | HEART RATE: 133 BPM | SYSTOLIC BLOOD PRESSURE: 128 MMHG | TEMPERATURE: 97.9 F

## 2022-11-30 LAB
ALBUMIN SERPL-MCNC: 4.4 G/DL (ref 3.5–5.2)
ALBUMIN/GLOB SERPL: 1.4 G/DL
ALP SERPL-CCNC: 112 U/L (ref 39–117)
ALT SERPL W P-5'-P-CCNC: 20 U/L (ref 1–41)
ANION GAP SERPL CALCULATED.3IONS-SCNC: 13.8 MMOL/L (ref 5–15)
AST SERPL-CCNC: 29 U/L (ref 1–40)
BASOPHILS # BLD AUTO: 0.12 10*3/MM3 (ref 0–0.2)
BASOPHILS NFR BLD AUTO: 1.2 % (ref 0–1.5)
BILIRUB SERPL-MCNC: <0.2 MG/DL (ref 0–1.2)
BUN SERPL-MCNC: 6 MG/DL (ref 6–20)
BUN/CREAT SERPL: 7.5 (ref 7–25)
CALCIUM SPEC-SCNC: 9.5 MG/DL (ref 8.6–10.5)
CHLORIDE SERPL-SCNC: 97 MMOL/L (ref 98–107)
CO2 SERPL-SCNC: 24.2 MMOL/L (ref 22–29)
CREAT SERPL-MCNC: 0.8 MG/DL (ref 0.76–1.27)
DEPRECATED RDW RBC AUTO: 44.1 FL (ref 37–54)
EGFRCR SERPLBLD CKD-EPI 2021: 109.8 ML/MIN/1.73
EOSINOPHIL # BLD AUTO: 0.28 10*3/MM3 (ref 0–0.4)
EOSINOPHIL NFR BLD AUTO: 2.8 % (ref 0.3–6.2)
ERYTHROCYTE [DISTWIDTH] IN BLOOD BY AUTOMATED COUNT: 13 % (ref 12.3–15.4)
GLOBULIN UR ELPH-MCNC: 3.2 GM/DL
GLUCOSE SERPL-MCNC: 128 MG/DL (ref 65–99)
HCT VFR BLD AUTO: 44 % (ref 37.5–51)
HGB BLD-MCNC: 15.2 G/DL (ref 13–17.7)
HOLD SPECIMEN: NORMAL
HOLD SPECIMEN: NORMAL
IMM GRANULOCYTES # BLD AUTO: 0.04 10*3/MM3 (ref 0–0.05)
IMM GRANULOCYTES NFR BLD AUTO: 0.4 % (ref 0–0.5)
LYMPHOCYTES # BLD AUTO: 2.84 10*3/MM3 (ref 0.7–3.1)
LYMPHOCYTES NFR BLD AUTO: 28.1 % (ref 19.6–45.3)
MCH RBC QN AUTO: 32.4 PG (ref 26.6–33)
MCHC RBC AUTO-ENTMCNC: 34.5 G/DL (ref 31.5–35.7)
MCV RBC AUTO: 93.8 FL (ref 79–97)
MONOCYTES # BLD AUTO: 0.78 10*3/MM3 (ref 0.1–0.9)
MONOCYTES NFR BLD AUTO: 7.7 % (ref 5–12)
NEUTROPHILS NFR BLD AUTO: 59.8 % (ref 42.7–76)
NEUTROPHILS NFR BLD AUTO: 6.03 10*3/MM3 (ref 1.7–7)
NRBC BLD AUTO-RTO: 0 /100 WBC (ref 0–0.2)
PLATELET # BLD AUTO: 275 10*3/MM3 (ref 140–450)
PMV BLD AUTO: 8.9 FL (ref 6–12)
POTASSIUM SERPL-SCNC: 3.7 MMOL/L (ref 3.5–5.2)
PROT SERPL-MCNC: 7.6 G/DL (ref 6–8.5)
QT INTERVAL: 345 MS
RBC # BLD AUTO: 4.69 10*6/MM3 (ref 4.14–5.8)
SODIUM SERPL-SCNC: 135 MMOL/L (ref 136–145)
TROPONIN T SERPL-MCNC: 0.07 NG/ML (ref 0–0.03)
WBC NRBC COR # BLD: 10.09 10*3/MM3 (ref 3.4–10.8)
WHOLE BLOOD HOLD COAG: NORMAL
WHOLE BLOOD HOLD SPECIMEN: NORMAL

## 2022-11-30 PROCEDURE — 85025 COMPLETE CBC W/AUTO DIFF WBC: CPT

## 2022-11-30 PROCEDURE — 99211 OFF/OP EST MAY X REQ PHY/QHP: CPT

## 2022-11-30 PROCEDURE — 84484 ASSAY OF TROPONIN QUANT: CPT

## 2022-11-30 PROCEDURE — 93005 ELECTROCARDIOGRAM TRACING: CPT

## 2022-11-30 PROCEDURE — 80053 COMPREHEN METABOLIC PANEL: CPT

## 2022-11-30 PROCEDURE — 36415 COLL VENOUS BLD VENIPUNCTURE: CPT

## 2022-11-30 PROCEDURE — 71046 X-RAY EXAM CHEST 2 VIEWS: CPT

## 2022-11-30 PROCEDURE — 93010 ELECTROCARDIOGRAM REPORT: CPT | Performed by: STUDENT IN AN ORGANIZED HEALTH CARE EDUCATION/TRAINING PROGRAM

## 2022-11-30 RX ORDER — SODIUM CHLORIDE 0.9 % (FLUSH) 0.9 %
10 SYRINGE (ML) INJECTION AS NEEDED
Status: DISCONTINUED | OUTPATIENT
Start: 2022-11-30 | End: 2022-11-30 | Stop reason: HOSPADM

## 2022-12-01 ENCOUNTER — APPOINTMENT (OUTPATIENT)
Dept: GENERAL RADIOLOGY | Facility: HOSPITAL | Age: 47
End: 2022-12-01

## 2022-12-01 ENCOUNTER — DOCUMENTATION (OUTPATIENT)
Dept: CARDIOLOGY | Facility: CLINIC | Age: 47
End: 2022-12-01

## 2022-12-01 ENCOUNTER — HOSPITAL ENCOUNTER (EMERGENCY)
Facility: HOSPITAL | Age: 47
Discharge: HOME | End: 2022-12-01
Attending: EMERGENCY MEDICINE | Admitting: EMERGENCY MEDICINE

## 2022-12-01 VITALS
BODY MASS INDEX: 25.62 KG/M2 | HEART RATE: 90 BPM | TEMPERATURE: 97.8 F | WEIGHT: 183 LBS | SYSTOLIC BLOOD PRESSURE: 107 MMHG | OXYGEN SATURATION: 93 % | RESPIRATION RATE: 16 BRPM | DIASTOLIC BLOOD PRESSURE: 74 MMHG | HEIGHT: 71 IN

## 2022-12-01 DIAGNOSIS — Z91.89 CHRONIC CHEST PAIN WITH LOW TO MODERATE RISK FOR CAD: ICD-10-CM

## 2022-12-01 DIAGNOSIS — I50.22 CHRONIC SYSTOLIC HEART FAILURE: ICD-10-CM

## 2022-12-01 DIAGNOSIS — R00.0 SINUS TACHYCARDIA: Primary | ICD-10-CM

## 2022-12-01 DIAGNOSIS — F10.10 ALCOHOL ABUSE: ICD-10-CM

## 2022-12-01 DIAGNOSIS — Z86.79 HISTORY OF CARDIOMYOPATHY: ICD-10-CM

## 2022-12-01 DIAGNOSIS — G89.29 CHRONIC CHEST PAIN WITH LOW TO MODERATE RISK FOR CAD: ICD-10-CM

## 2022-12-01 DIAGNOSIS — F10.288 ALCOHOL DEPENDENCE WITH OTHER ALCOHOL-INDUCED DISORDER: ICD-10-CM

## 2022-12-01 DIAGNOSIS — R07.9 CHEST PAIN, UNSPECIFIED TYPE: Primary | ICD-10-CM

## 2022-12-01 DIAGNOSIS — G47.33 OSA (OBSTRUCTIVE SLEEP APNEA): ICD-10-CM

## 2022-12-01 DIAGNOSIS — R77.8 ELEVATED TROPONIN: ICD-10-CM

## 2022-12-01 DIAGNOSIS — R07.9 CHRONIC CHEST PAIN WITH LOW TO MODERATE RISK FOR CAD: ICD-10-CM

## 2022-12-01 LAB
ALBUMIN SERPL-MCNC: 4.2 G/DL (ref 3.5–5.2)
ALBUMIN/GLOB SERPL: 1.4 G/DL
ALP SERPL-CCNC: 114 U/L (ref 39–117)
ALT SERPL W P-5'-P-CCNC: 23 U/L (ref 1–41)
ANION GAP SERPL CALCULATED.3IONS-SCNC: 14.6 MMOL/L (ref 5–15)
APTT PPP: 30.2 SECONDS (ref 22.7–35.4)
AST SERPL-CCNC: 34 U/L (ref 1–40)
BASOPHILS # BLD AUTO: 0.08 10*3/MM3 (ref 0–0.2)
BASOPHILS NFR BLD AUTO: 1 % (ref 0–1.5)
BILIRUB SERPL-MCNC: <0.2 MG/DL (ref 0–1.2)
BUN SERPL-MCNC: 6 MG/DL (ref 6–20)
BUN/CREAT SERPL: 8.5 (ref 7–25)
CALCIUM SPEC-SCNC: 9.2 MG/DL (ref 8.6–10.5)
CHLORIDE SERPL-SCNC: 99 MMOL/L (ref 98–107)
CO2 SERPL-SCNC: 23.4 MMOL/L (ref 22–29)
CREAT SERPL-MCNC: 0.71 MG/DL (ref 0.76–1.27)
D DIMER PPP FEU-MCNC: 0.31 MCGFEU/ML (ref 0–0.5)
DEPRECATED RDW RBC AUTO: 40.3 FL (ref 37–54)
EGFRCR SERPLBLD CKD-EPI 2021: 113.9 ML/MIN/1.73
EOSINOPHIL # BLD AUTO: 0.23 10*3/MM3 (ref 0–0.4)
EOSINOPHIL NFR BLD AUTO: 2.8 % (ref 0.3–6.2)
ERYTHROCYTE [DISTWIDTH] IN BLOOD BY AUTOMATED COUNT: 12.4 % (ref 12.3–15.4)
GLOBULIN UR ELPH-MCNC: 3.1 GM/DL
GLUCOSE SERPL-MCNC: 145 MG/DL (ref 65–99)
HCT VFR BLD AUTO: 42.2 % (ref 37.5–51)
HGB BLD-MCNC: 15.3 G/DL (ref 13–17.7)
HOLD SPECIMEN: NORMAL
HOLD SPECIMEN: NORMAL
IMM GRANULOCYTES # BLD AUTO: 0.05 10*3/MM3 (ref 0–0.05)
IMM GRANULOCYTES NFR BLD AUTO: 0.6 % (ref 0–0.5)
INR PPP: 0.93 (ref 0.9–1.1)
LYMPHOCYTES # BLD AUTO: 2.24 10*3/MM3 (ref 0.7–3.1)
LYMPHOCYTES NFR BLD AUTO: 26.9 % (ref 19.6–45.3)
MCH RBC QN AUTO: 32.2 PG (ref 26.6–33)
MCHC RBC AUTO-ENTMCNC: 36.3 G/DL (ref 31.5–35.7)
MCV RBC AUTO: 88.8 FL (ref 79–97)
MONOCYTES # BLD AUTO: 0.72 10*3/MM3 (ref 0.1–0.9)
MONOCYTES NFR BLD AUTO: 8.7 % (ref 5–12)
NEUTROPHILS NFR BLD AUTO: 5 10*3/MM3 (ref 1.7–7)
NEUTROPHILS NFR BLD AUTO: 60 % (ref 42.7–76)
NRBC BLD AUTO-RTO: 0 /100 WBC (ref 0–0.2)
NT-PROBNP SERPL-MCNC: 34.1 PG/ML (ref 0–450)
PLATELET # BLD AUTO: 272 10*3/MM3 (ref 140–450)
PMV BLD AUTO: 9.3 FL (ref 6–12)
POTASSIUM SERPL-SCNC: 3.7 MMOL/L (ref 3.5–5.2)
PROT SERPL-MCNC: 7.3 G/DL (ref 6–8.5)
PROTHROMBIN TIME: 12.6 SECONDS (ref 11.7–14.2)
QT INTERVAL: 356 MS
RBC # BLD AUTO: 4.75 10*6/MM3 (ref 4.14–5.8)
SODIUM SERPL-SCNC: 137 MMOL/L (ref 136–145)
TROPONIN T SERPL-MCNC: 0.12 NG/ML (ref 0–0.03)
TROPONIN T SERPL-MCNC: 0.15 NG/ML (ref 0–0.03)
WBC NRBC COR # BLD: 8.32 10*3/MM3 (ref 3.4–10.8)
WHOLE BLOOD HOLD COAG: NORMAL
WHOLE BLOOD HOLD SPECIMEN: NORMAL

## 2022-12-01 PROCEDURE — 93005 ELECTROCARDIOGRAM TRACING: CPT | Performed by: EMERGENCY MEDICINE

## 2022-12-01 PROCEDURE — 85610 PROTHROMBIN TIME: CPT | Performed by: EMERGENCY MEDICINE

## 2022-12-01 PROCEDURE — 85379 FIBRIN DEGRADATION QUANT: CPT | Performed by: EMERGENCY MEDICINE

## 2022-12-01 PROCEDURE — 85025 COMPLETE CBC W/AUTO DIFF WBC: CPT | Performed by: EMERGENCY MEDICINE

## 2022-12-01 PROCEDURE — 71045 X-RAY EXAM CHEST 1 VIEW: CPT

## 2022-12-01 PROCEDURE — 96367 TX/PROPH/DG ADDL SEQ IV INF: CPT

## 2022-12-01 PROCEDURE — 85730 THROMBOPLASTIN TIME PARTIAL: CPT | Performed by: EMERGENCY MEDICINE

## 2022-12-01 PROCEDURE — 83880 ASSAY OF NATRIURETIC PEPTIDE: CPT | Performed by: EMERGENCY MEDICINE

## 2022-12-01 PROCEDURE — 25010000002 THIAMINE PER 100 MG: Performed by: EMERGENCY MEDICINE

## 2022-12-01 PROCEDURE — 93010 ELECTROCARDIOGRAM REPORT: CPT | Performed by: INTERNAL MEDICINE

## 2022-12-01 PROCEDURE — 99284 EMERGENCY DEPT VISIT MOD MDM: CPT

## 2022-12-01 PROCEDURE — 96365 THER/PROPH/DIAG IV INF INIT: CPT

## 2022-12-01 PROCEDURE — 36415 COLL VENOUS BLD VENIPUNCTURE: CPT

## 2022-12-01 PROCEDURE — 80053 COMPREHEN METABOLIC PANEL: CPT | Performed by: EMERGENCY MEDICINE

## 2022-12-01 PROCEDURE — 84484 ASSAY OF TROPONIN QUANT: CPT | Performed by: EMERGENCY MEDICINE

## 2022-12-01 RX ORDER — ASPIRIN 81 MG/1
324 TABLET, CHEWABLE ORAL ONCE
Status: COMPLETED | OUTPATIENT
Start: 2022-12-01 | End: 2022-12-01

## 2022-12-01 RX ORDER — MULTIPLE VITAMINS W/ MINERALS TAB 9MG-400MCG
1 TAB ORAL ONCE
Status: COMPLETED | OUTPATIENT
Start: 2022-12-01 | End: 2022-12-01

## 2022-12-01 RX ORDER — FOLIC ACID 5 MG/ML
1 INJECTION, SOLUTION INTRAMUSCULAR; INTRAVENOUS; SUBCUTANEOUS ONCE
Status: DISCONTINUED | OUTPATIENT
Start: 2022-12-01 | End: 2022-12-01 | Stop reason: CLARIF

## 2022-12-01 RX ORDER — SODIUM CHLORIDE 0.9 % (FLUSH) 0.9 %
10 SYRINGE (ML) INJECTION AS NEEDED
Status: DISCONTINUED | OUTPATIENT
Start: 2022-12-01 | End: 2022-12-01 | Stop reason: HOSPADM

## 2022-12-01 RX ADMIN — MULTIPLE VITAMINS W/ MINERALS TAB 1 TABLET: TAB at 09:12

## 2022-12-01 RX ADMIN — THIAMINE HYDROCHLORIDE 100 MG: 100 INJECTION, SOLUTION INTRAMUSCULAR; INTRAVENOUS at 09:11

## 2022-12-01 RX ADMIN — FOLIC ACID 1 MG: 5 INJECTION, SOLUTION INTRAMUSCULAR; INTRAVENOUS; SUBCUTANEOUS at 09:11

## 2022-12-01 RX ADMIN — ASPIRIN 324 MG: 81 TABLET, CHEWABLE ORAL at 09:40

## 2022-12-01 NOTE — ED PROVIDER NOTES
EMERGENCY DEPARTMENT ENCOUNTER  I wore full protective equipment throughout this patient encounter including a N95 mask, eye shield, gown and gloves. Hand hygiene was performed before donning protective equipment and after removal when leaving the room.    Room Number:  35/35  Date of encounter:  12/1/2022  PCP: Trish Nash MD    HPI:  Context: Bogdan Jeter is a 46 y.o. male who presents to the ED c/o chief complaint of chest pain.  Patient reports that he has continued to have chest pain since he was discharged from hospital.  Chest pain is constant, described as a well localized tightness in the center of his chest sternum.  Pain does not radiate, is not exertional, patient denies any aggravating or ameliorating factors.  Patient reports shortness of breath.  No nausea vomiting, no diaphoresis.  Patient reports pain is chronic, denies any change in the pain.  Patient reports that he is just sick of the symptoms, came to the emergency department yesterday but it was too busy, left.  Patient admits to continuing to drink alcohol.    MEDICAL HISTORY REVIEW  Reviewed in EPIC    PAST MEDICAL HISTORY  Active Ambulatory Problems     Diagnosis Date Noted   • Tobacco abuse 05/09/2022   • Alcohol withdrawal syndrome without complication (HCC) 05/09/2022   • Sinus tachycardia 05/17/2022   • Chronic systolic heart failure (CMS/HCC) 05/20/2022   • Alcohol dependence with other alcohol-induced disorder ([facility]) 07/20/2022   • Chest pain 11/09/2022   • Acute chest pain 11/09/2022   • Chronic HFrEF (heart failure with reduced ejection fraction) ([facility]) 11/12/2022     Resolved Ambulatory Problems     Diagnosis Date Noted   • Intractable back pain 05/09/2022   • Hypokalemia 05/09/2022   • Heart burn 05/09/2022   • Elevated BP without diagnosis of hypertension 05/17/2022     Past Medical History:   Diagnosis Date   • CHF (congestive heart failure) ([facility])        PAST SURGICAL HISTORY  History reviewed. No pertinent surgical  history.    FAMILY HISTORY  History reviewed. No pertinent family history.    SOCIAL HISTORY  Social History     Socioeconomic History   • Marital status: Single   Tobacco Use   • Smoking status: Every Day     Packs/day: 1.00     Years: 30.00     Pack years: 30.00     Types: Cigarettes   • Smokeless tobacco: Never   Vaping Use   • Vaping Use: Never used   Substance and Sexual Activity   • Alcohol use: Yes     Alcohol/week: 110.0 standard drinks     Types: 5 Cans of beer, 105 Drinks containing 0.5 oz of alcohol per week   • Drug use: Yes     Frequency: 7.0 times per week     Types: Marijuana     Comment: daily use, last used 1 day ago.   • Sexual activity: Defer       ALLERGIES  Patient has no known allergies.    The patient's allergies have been reviewed    REVIEW OF SYSTEMS  All systems reviewed and negative except for those discussed in HPI.     PHYSICAL EXAM  I have reviewed the triage vital signs and nursing notes.  ED Triage Vitals [12/01/22 0809]   Temp Heart Rate Resp BP SpO2   97.8 °F (36.6 °C) (!) 136 16 -- 96 %      Temp src Heart Rate Source Patient Position BP Location FiO2 (%)   Tympanic Monitor -- -- --       General: No acute distress.  HENT: NCAT, PERRL, Nares patent.  Eyes: no scleral icterus.  Neck: trachea midline, no ROM limitations.  CV: regular rhythm, tachycardic rate.  Respiratory: normal effort, CTAB.  Abdomen: soft, nondistended, NTTP, no rebound tenderness, no guarding or rigidity.  Musculoskeletal: no deformity.  Neuro: alert, moves all extremities, follows commands.  Skin: warm, dry.    LAB RESULTS  Recent Results (from the past 24 hour(s))   ECG 12 Lead ED Triage Standing Order; Chest Pain    Collection Time: 11/30/22  2:49 PM   Result Value Ref Range    QT Interval 345 ms   Comprehensive Metabolic Panel    Collection Time: 11/30/22  3:07 PM    Specimen: Blood   Result Value Ref Range    Glucose 128 (H) 65 - 99 mg/dL    BUN 6 6 - 20 mg/dL    Creatinine 0.80 0.76 - 1.27 mg/dL    Sodium  135 (L) 136 - 145 mmol/L    Potassium 3.7 3.5 - 5.2 mmol/L    Chloride 97 (L) 98 - 107 mmol/L    CO2 24.2 22.0 - 29.0 mmol/L    Calcium 9.5 8.6 - 10.5 mg/dL    Total Protein 7.6 6.0 - 8.5 g/dL    Albumin 4.40 3.50 - 5.20 g/dL    ALT (SGPT) 20 1 - 41 U/L    AST (SGOT) 29 1 - 40 U/L    Alkaline Phosphatase 112 39 - 117 U/L    Total Bilirubin <0.2 0.0 - 1.2 mg/dL    Globulin 3.2 gm/dL    A/G Ratio 1.4 g/dL    BUN/Creatinine Ratio 7.5 7.0 - 25.0    Anion Gap 13.8 5.0 - 15.0 mmol/L    eGFR 109.8 >60.0 mL/min/1.73   Troponin    Collection Time: 11/30/22  3:07 PM    Specimen: Blood   Result Value Ref Range    Troponin T 0.065 (C) 0.000 - 0.030 ng/mL   Green Top (Gel)    Collection Time: 11/30/22  3:07 PM   Result Value Ref Range    Extra Tube Hold for add-ons.    Lavender Top    Collection Time: 11/30/22  3:07 PM   Result Value Ref Range    Extra Tube hold for add-on    Gold Top - SST    Collection Time: 11/30/22  3:07 PM   Result Value Ref Range    Extra Tube Hold for add-ons.    Light Blue Top    Collection Time: 11/30/22  3:07 PM   Result Value Ref Range    Extra Tube Hold for add-ons.    CBC Auto Differential    Collection Time: 11/30/22  3:07 PM    Specimen: Blood   Result Value Ref Range    WBC 10.09 3.40 - 10.80 10*3/mm3    RBC 4.69 4.14 - 5.80 10*6/mm3    Hemoglobin 15.2 13.0 - 17.7 g/dL    Hematocrit 44.0 37.5 - 51.0 %    MCV 93.8 79.0 - 97.0 fL    MCH 32.4 26.6 - 33.0 pg    MCHC 34.5 31.5 - 35.7 g/dL    RDW 13.0 12.3 - 15.4 %    RDW-SD 44.1 37.0 - 54.0 fl    MPV 8.9 6.0 - 12.0 fL    Platelets 275 140 - 450 10*3/mm3    Neutrophil % 59.8 42.7 - 76.0 %    Lymphocyte % 28.1 19.6 - 45.3 %    Monocyte % 7.7 5.0 - 12.0 %    Eosinophil % 2.8 0.3 - 6.2 %    Basophil % 1.2 0.0 - 1.5 %    Immature Grans % 0.4 0.0 - 0.5 %    Neutrophils, Absolute 6.03 1.70 - 7.00 10*3/mm3    Lymphocytes, Absolute 2.84 0.70 - 3.10 10*3/mm3    Monocytes, Absolute 0.78 0.10 - 0.90 10*3/mm3    Eosinophils, Absolute 0.28 0.00 - 0.40 10*3/mm3     Basophils, Absolute 0.12 0.00 - 0.20 10*3/mm3    Immature Grans, Absolute 0.04 0.00 - 0.05 10*3/mm3    nRBC 0.0 0.0 - 0.2 /100 WBC   ECG 12 Lead Chest Pain    Collection Time: 12/01/22  8:20 AM   Result Value Ref Range    QT Interval 356 ms   aPTT    Collection Time: 12/01/22  8:41 AM    Specimen: Blood   Result Value Ref Range    PTT 30.2 22.7 - 35.4 seconds   Protime-INR    Collection Time: 12/01/22  8:41 AM    Specimen: Blood   Result Value Ref Range    Protime 12.6 11.7 - 14.2 Seconds    INR 0.93 0.90 - 1.10   BNP    Collection Time: 12/01/22  8:41 AM    Specimen: Blood   Result Value Ref Range    proBNP 34.1 0.0 - 450.0 pg/mL   D-dimer, Quantitative    Collection Time: 12/01/22  8:41 AM    Specimen: Blood   Result Value Ref Range    D-Dimer, Quantitative 0.31 0.00 - 0.50 MCGFEU/mL   Comprehensive Metabolic Panel    Collection Time: 12/01/22  8:41 AM    Specimen: Blood   Result Value Ref Range    Glucose 145 (H) 65 - 99 mg/dL    BUN 6 6 - 20 mg/dL    Creatinine 0.71 (L) 0.76 - 1.27 mg/dL    Sodium 137 136 - 145 mmol/L    Potassium 3.7 3.5 - 5.2 mmol/L    Chloride 99 98 - 107 mmol/L    CO2 23.4 22.0 - 29.0 mmol/L    Calcium 9.2 8.6 - 10.5 mg/dL    Total Protein 7.3 6.0 - 8.5 g/dL    Albumin 4.20 3.50 - 5.20 g/dL    ALT (SGPT) 23 1 - 41 U/L    AST (SGOT) 34 1 - 40 U/L    Alkaline Phosphatase 114 39 - 117 U/L    Total Bilirubin <0.2 0.0 - 1.2 mg/dL    Globulin 3.1 gm/dL    A/G Ratio 1.4 g/dL    BUN/Creatinine Ratio 8.5 7.0 - 25.0    Anion Gap 14.6 5.0 - 15.0 mmol/L    eGFR 113.9 >60.0 mL/min/1.73   Troponin    Collection Time: 12/01/22  8:41 AM    Specimen: Blood   Result Value Ref Range    Troponin T 0.147 (C) 0.000 - 0.030 ng/mL   Green Top (Gel)    Collection Time: 12/01/22  8:41 AM   Result Value Ref Range    Extra Tube Hold for add-ons.    Lavender Top    Collection Time: 12/01/22  8:41 AM   Result Value Ref Range    Extra Tube hold for add-on    Gold Top - SST    Collection Time: 12/01/22  8:41 AM   Result  Value Ref Range    Extra Tube Hold for add-ons.    Light Blue Top    Collection Time: 12/01/22  8:41 AM   Result Value Ref Range    Extra Tube Hold for add-ons.    CBC Auto Differential    Collection Time: 12/01/22  8:41 AM    Specimen: Blood   Result Value Ref Range    WBC 8.32 3.40 - 10.80 10*3/mm3    RBC 4.75 4.14 - 5.80 10*6/mm3    Hemoglobin 15.3 13.0 - 17.7 g/dL    Hematocrit 42.2 37.5 - 51.0 %    MCV 88.8 79.0 - 97.0 fL    MCH 32.2 26.6 - 33.0 pg    MCHC 36.3 (H) 31.5 - 35.7 g/dL    RDW 12.4 12.3 - 15.4 %    RDW-SD 40.3 37.0 - 54.0 fl    MPV 9.3 6.0 - 12.0 fL    Platelets 272 140 - 450 10*3/mm3    Neutrophil % 60.0 42.7 - 76.0 %    Lymphocyte % 26.9 19.6 - 45.3 %    Monocyte % 8.7 5.0 - 12.0 %    Eosinophil % 2.8 0.3 - 6.2 %    Basophil % 1.0 0.0 - 1.5 %    Immature Grans % 0.6 (H) 0.0 - 0.5 %    Neutrophils, Absolute 5.00 1.70 - 7.00 10*3/mm3    Lymphocytes, Absolute 2.24 0.70 - 3.10 10*3/mm3    Monocytes, Absolute 0.72 0.10 - 0.90 10*3/mm3    Eosinophils, Absolute 0.23 0.00 - 0.40 10*3/mm3    Basophils, Absolute 0.08 0.00 - 0.20 10*3/mm3    Immature Grans, Absolute 0.05 0.00 - 0.05 10*3/mm3    nRBC 0.0 0.0 - 0.2 /100 WBC   Troponin    Collection Time: 12/01/22  9:38 AM    Specimen: Blood   Result Value Ref Range    Troponin T 0.124 (C) 0.000 - 0.030 ng/mL       I ordered the above labs and reviewed the results.    RADIOLOGY  XR Chest 2 View    Result Date: 11/30/2022  XR CHEST 2 VW-  HISTORY: Male who is 46 years-old,  chest pain  TECHNIQUE: Frontal and lateral views of the chest  COMPARISON: 11/09/2022  FINDINGS: Heart, mediastinum and pulmonary vasculature are unremarkable. No focal pulmonary consolidation, pleural effusion, or pneumothorax. No acute osseous process.      No evidence for acute pulmonary process. Follow-up as clinical indications persist.  This report was finalized on 11/30/2022 3:03 PM by Dr. Jim Hoffman M.D.      XR Chest 1 View    Result Date: 12/1/2022  XR CHEST 1 VW-  Clinical:  Chest pain  COMPARISON examination 11/30/2022  FINDINGS: The cardiomediastinal silhouette is stable and satisfactory in appearance. No effusion, edema or acute airspace disease has developed.  CONCLUSION: No active disease of the chest  This report was finalized on 12/1/2022 8:45 AM by Dr. Krunal Schaffer M.D.        I ordered the above noted radiological studies. I reviewed the images and results. I agree with the radiologist interpretation.    PROCEDURES  Procedures    MEDICATIONS GIVEN IN ER  Medications   sodium chloride 0.9 % flush 10 mL (has no administration in time range)   aspirin chewable tablet 324 mg (0 mg Oral Hold 12/1/22 0940)   thiamine (B-1) 100 mg in sodium chloride 0.9 % 100 mL IVPB (0 mg Intravenous Stopped 12/1/22 1030)   multivitamin with minerals 1 tablet (1 tablet Oral Given 12/1/22 0912)   folic acid 1 mg in sodium chloride 0.9 % 50 mL IVPB (1 mg Intravenous New Bag 12/1/22 0911)       PROGRESS, DATA ANALYSIS, CONSULTS, AND MEDICAL DECISION MAKING  A complete history and physical exam have been performed.  All available laboratory and imaging results have been reviewed by myself prior to disposition.    MDM  After the initial H&P, I discussed pertinent information from history and physical exam with patient/family.  Discussed differential diagnosis.  Discussed plan for ED evaluation/workup/treatment.  All questions answered.  Patient/family is agreeable with plan.  ED Course as of 12/01/22 1046   Thu Dec 01, 2022   0812 My differential diagnosis for chest pain includes but is not limited to:  Muscle strain, costochondritis, myositis, pleurisy, rib fracture, intercostal neuritis, herpes zoster, tumor, myocardial infarction, coronary syndrome, unstable angina, angina, aortic dissection, mitral valve prolapse, pericarditis, palpitations, pulmonary embolus, pneumonia, pneumothorax, lung cancer, GERD, esophagitis, esophageal spasm     [JG]   0815 Medical history reviewed and significant for:  Patient was admitted to the hospital from the ninth to 12 November of this year.  46-year-old male with history of nonischemic chronic heart failure, hypertension, alcohol use disorder with history of alcohol withdrawal.  Patient was admitted for chest pain, evaluated by cardiology, echo showed EF of 35 to 40%, improved from prior.  Patient had recent stress test that was normal, cardiology felt no further work-up was indicated.  Patient required CIWA while inpatient, declined alcohol cessation materials.  Patient was seen by cardiology on the 23rd of last month, was continuing of continued chest pain and shortness of breath.  During visit patient was noted to have persistent tachycardia but was not amenable to beta-blocker secondary to borderline hypotension.  Patient continued to use alcohol. [JG]   0828 EKG independently viewed and contemporaneously interpreted by ED physician. Time: 8:20 AM.  Rate 100.  Interpretation: Sinus tachycardia, left axis deviation, left anterior fascicular block, no acute ST changes. [JG]   0828 When compared with prior EKG on 11/30/2022, no acute changes are present. [JG]   0926 Troponin 0.147.  Patient appears to have baseline troponin elevation, has had similar in the past 3 weeks ago.  Will obtain repeat troponin to ensure is not uptrending.   [JG]   1019 Patient afebrile, vital signs stable.  No leukocytosis repeat troponin 0.124, slightly decreased from earlier. [JG]   1019 Consulting with cardiology. [JG]   1030 HEART SCORE:    History #0  (Highly suspicious 2, Moderately suspicious 1, Slightly or non-suspicious 0)    ECG #0  (Significant ST depression 2,  Nonspecific repol disturbance 1, Normal 0)    Age #1  (> or = 65 2, 46-65 1,  < or = 45 0)    Risk factors #1  (hypercholesterolemia, HTN, DM, smoking, pos fam hx, obesity)  (> or = to 3 RF 2, 1 or 2 1, No risk factors 0)    Troponin #2  (> or = 3x normal limit 2, 1-3x normal limit 1, < or = Normal limit 0)    HEART Score  Key:  Scores 0-3: 0.9-1.7% risk of adverse cardiac event. In the HEART Score study, these patients were discharged (0.99% in the retrospective study, 1.7% in the prospective study)  Scores 4-6: 12-16.6% risk of adverse cardiac event. In the HEART Score study, these patients were admitted to the hospital. (11.6% retrospective, 16.6% prospective)  Scores =7: 50-65% risk of adverse cardiac event. In the HEART Score study, these patients were candidates for early invasive measures. (65.2% retrospective, 50.1% prospective)      This patient's HEART score is 4     [JG]   1042 Phone call with Dr. Villagomez, cardiology.  Discussed the patient, relevant history, exam, diagnostics, ED findings/progress, and concerns.  She states that given patient's symptoms are unchanged and troponin is not uptrending, she believes that patient is stable for discharge although she will arrange for outpatient cardiac cath for the patient. [JG]      ED Course User Index  [JG] Steven Taylor MD       AS OF 10:46 EST VITALS:    BP - 107/74  HR - 90  TEMP - 97.8 °F (36.6 °C) (Tympanic)  O2 SATS - 93%    DIAGNOSIS  Final diagnoses:   Chest pain, unspecified type   History of cardiomyopathy   Elevated troponin   Alcohol abuse         DISPOSITION  DISCHARGE    Patient discharged in stable condition.    Reviewed implications of results, diagnosis, meds, responsibility to follow up, warning signs and symptoms of possible worsening, potential complications and reasons to return to ER.    Patient/Family voiced understanding of above instructions.    Discussed plan for discharge, as there is no emergent indication for admission. Patient referred to primary care provider for BP management due to today's BP. Pt/family is agreeable and understands need for follow up and repeat testing.  Pt is aware that discharge does not mean that nothing is wrong but it indicates no emergency is present that requires admission and they must continue care with follow-up as  given below or physician of their choice.     FOLLOW-UP  Trish Nash MD  [address] [facility] [address] [facility] [address]  [place] [address]  [phone]    Schedule an appointment as soon as possible for a visit in 2 days  even if well    [facility] CARDIOLOGY  [address]  [facility] [address] [phone]  Schedule an appointment as soon as possible for a visit in 2 days           Medication List      No changes were made to your prescriptions during this visit.          Steven Taylor MD  12/01/22 4993

## 2022-12-01 NOTE — ED TRIAGE NOTES
Pt signed in c cp last night and left r/t wait.  He had an ekg at that time.  He continues to have cp and soa    Patient was placed in face mask during first look triage.  Patient was wearing a face mask throughout encounter.  I wore personal protective equipment throughout the encounter.  Hand hygiene was performed before and after patient encounter.

## 2022-12-01 NOTE — PROGRESS NOTES
Spoke with the emergency room doctor.  Chronically elevated troponin.  Here again with chest pain.  Normal stress test.  Ejection fraction improved with medical therapy.  I recommend that we proceed with an outpatient heart catheterization for further evaluation of his symptoms.  Order placed.  Please call to schedule.

## 2022-12-24 NOTE — PROGRESS NOTES
Name: Bogdan Jeter ADMIT: 2022   : 1975  PCP: Provider, No Known    MRN: [id number] LOS: 6 days   AGE/SEX: 46 y.o. male  ROOM: [facility]   Subjective   Chief Complaint   Patient presents with   • Back Pain     Mr. Jeter is a 46 year old male who presented to the hospital with complaints of back pain. Hospital course complicated by severe alcohol w/d.     Severe alcohol w/d requiring high doses of ativan as well as transfer to the ICU and precedex    Off precedex  Still requiring ativan  Hallucinations this AM  +back pain and leg pain    ROS  No f/c  No n/v  No cp/palp  Mild soa/cough    Objective   Vital Signs  Temp:  [97.4 °F (36.3 °C)-98.6 °F (37 °C)] 97.5 °F (36.4 °C)  Heart Rate:  [108-137] 115  Resp:  [15-20] 18  BP: (105-151)/(59-96) 129/82  SpO2:  [94 %-100 %] 98 %  on   ;   Device (Oxygen Therapy): room air  Body mass index is 21.98 kg/m².    Physical Exam  Constitutional:       General: He is in acute distress.      Appearance: He is ill-appearing.   HENT:      Head: Normocephalic and atraumatic.   Eyes:      General: No scleral icterus.  Cardiovascular:      Rate and Rhythm: Normal rate and regular rhythm.      Heart sounds: Normal heart sounds.   Pulmonary:      Effort: No respiratory distress (slight, decreased bs at bases).      Breath sounds: Normal breath sounds.   Abdominal:      General: There is no distension.      Palpations: Abdomen is soft.      Tenderness: There is no abdominal tenderness.   Musculoskeletal:      Cervical back: Neck supple.     awake   +hallucinating this morning    Results Review:       I reviewed the patient's new clinical results.  Results from last 7 days   Lab Units 22  0343 22  0414 22  0322 22  0559   WBC 10*3/mm3 8.54 7.18 9.17 7.28   HEMOGLOBIN g/dL 14.5 13.5 14.0 14.3   PLATELETS 10*3/mm3 205 199 203 211     Results from last 7 days   Lab Units 05/15/22  0430 22  0343 22  0519 22  0414   SODIUM mmol/L 135*  135* 135* 134*   POTASSIUM mmol/L 4.5 4.1 4.7 5.4*   CHLORIDE mmol/L 100 101 104 105   CO2 mmol/L 22.0 21.0* 22.0 21.0*   BUN mg/dL 8 5* 4* 3*   CREATININE mg/dL 0.59* 0.74* 0.55* 0.53*   GLUCOSE mg/dL 91 117* 174* 365*   Estimated Creatinine Clearance: 156.5 mL/min (A) (by C-G formula based on SCr of 0.59 mg/dL (L)).  Results from last 7 days   Lab Units 05/13/22  0414 05/12/22  0322 05/11/22  0559 05/10/22  0459   ALBUMIN g/dL 3.00* 3.40* 3.30* 3.20*   BILIRUBIN mg/dL 1.0 0.9 0.9 1.3*   ALK PHOS U/L 103 122* 119* 126*   AST (SGOT) U/L 28 42* 50* 69*   ALT (SGPT) U/L 23 29 33 44*     Results from last 7 days   Lab Units 05/15/22  0430 05/14/22  0343 05/13/22  0519 05/13/22 0414 05/12/22 0322 05/11/22 0559 05/10/22  0459   CALCIUM mg/dL 9.5 9.4 9.0 8.3* 8.9 8.6 8.2*   ALBUMIN g/dL  --   --   --  3.00* 3.40* 3.30* 3.20*   MAGNESIUM mg/dL 1.7  --   --   --  1.8  --   --    PHOSPHORUS mg/dL 5.0* 3.7  --  3.7 3.6  --   --      Results from last 7 days   Lab Units 05/14/22  0343   PROCALCITONIN ng/mL 0.16       Coag     HbA1C No results found for: HGBA1C  Infection   Results from last 7 days   Lab Units 05/14/22  0343   PROCALCITONIN ng/mL 0.16     Radiology(recent) XR Chest 1 View    Result Date: 5/14/2022  Vascular congestion, as well as bibasilar atelectasis.  This report was finalized on 5/14/2022 4:49 AM by PREMA Lr.D.      No results found for: TROPONINT, TROPONINI, BNP  No components found for: TSH;2    enoxaparin, 40 mg, Subcutaneous, Nightly  folic acid (FOLVITE) IVPB, 1 mg, Intravenous, Daily  gabapentin, 100 mg, Oral, Q12H  lidocaine, 2 patch, Transdermal, Q24H  LORazepam, 1 mg, Intravenous, Q8H  [START ON 5/17/2022] methylPREDNISolone, 4 mg, Oral, TID Around Food  [START ON 5/18/2022] methylPREDNISolone, 4 mg, Oral, Before Breakfast  [START ON 5/18/2022] methylPREDNISolone, 4 mg, Oral, Tonight  [START ON 5/19/2022] methylPREDNISolone, 4 mg, Oral, Before Breakfast  methylPREDNISolone, 8 mg,  MD Conteh Oral, Tonight  nicotine, 1 patch, Transdermal, Q24H  sodium chloride, 10 mL, Intravenous, Q12H       Diet Regular      Assessment & Plan      Active Hospital Problems    Diagnosis  POA   • **Intractable back pain [M54.9]  Yes   • Hypokalemia [E87.6]  Yes   • Tobacco abuse [Z72.0]  Yes   • Alcohol withdrawal (HCC) [F10.239]  Yes   • Heart burn [R12]  Yes      Resolved Hospital Problems   No resolved problems to display.     Mr. Jeter is a 46 year old male who presented to the hospital with complaints of back pain. Hospital course complicated by severe alcohol w/d.     · Severe alcohol w/d requiring high doses of ativan as well as transfer to the ICU and precedex. Off precedex now but still with hallucinations. Continue as needed ativan based on ciwa protocol.    · Intractable back pain: MRI now returned with some DDD but no acute fracture/compression/disc herniation. L4-5 with shortened pedicles and mild central canal stenosis. Nothing surgical it seems. Orthopedic surgery was consulted- they agree with supportive care with pain meds, etc. PT evaluation. Medrol dose pack started  · Nicotine patch  · Monitor electrolytes  · Monitor BG      Thanks to VLADISLAV/Dr. Mercedes/Dr. Ferguson for care in ICU  LHA available if patient moved out of ICU      Cristian Lopez MD  [facility]  05/15/22  13:24 EDT

## 2022-12-28 ENCOUNTER — OFFICE VISIT (OUTPATIENT)
Dept: SLEEP MEDICINE | Facility: HOSPITAL | Age: 47
End: 2022-12-28

## 2022-12-28 VITALS
HEART RATE: 71 BPM | HEIGHT: 71 IN | OXYGEN SATURATION: 95 % | DIASTOLIC BLOOD PRESSURE: 63 MMHG | WEIGHT: 185 LBS | SYSTOLIC BLOOD PRESSURE: 101 MMHG | BODY MASS INDEX: 25.9 KG/M2

## 2022-12-28 DIAGNOSIS — G47.33 OSA (OBSTRUCTIVE SLEEP APNEA): ICD-10-CM

## 2022-12-28 DIAGNOSIS — Z72.0 TOBACCO ABUSE: ICD-10-CM

## 2022-12-28 DIAGNOSIS — R06.83 LOUD SNORING: ICD-10-CM

## 2022-12-28 DIAGNOSIS — G47.10 HYPERSOMNOLENCE: Primary | ICD-10-CM

## 2022-12-28 DIAGNOSIS — I50.30 DIASTOLIC CONGESTIVE HEART FAILURE, UNSPECIFIED HF CHRONICITY: ICD-10-CM

## 2022-12-28 PROCEDURE — G0463 HOSPITAL OUTPT CLINIC VISIT: HCPCS

## 2022-12-28 NOTE — PROGRESS NOTES
[facility] SLEEP MEDICINE  [address]  [address]  [facility] [address] [phone]    Referring Physician: Dr. Sharona Villagomez  PCP: Trish Nash MD    Reason for consult:  Sleep disorders of witnessed apneas    Bogdan Jeter is a 47 y.o.male was seen in the [facility] today. He was hospital and told that he has witnessed apneas. Advised to obtain sleep study. He has witnessed apneas and snoring at night he sleeps from 9pm to 6am. Wakes up feeling tired and unrefreshed. Has gained 45 lbs. Being worked up for cardiac issues.  Current cig smoker 1 ppd.  [place] Sleepiness Score: 9. Caffeine intake 2 per day. Alcohol intake 7 per week.    Bogdan Jeter  has a past medical history of CHF (congestive heart failure) ([facility]).     Current Medications:    Current Outpatient Medications:   •  gabapentin (NEURONTIN) 100 MG capsule, Take 1 capsule by mouth Every 12 (Twelve) Hours for 3 days., Disp: 6 capsule, Rfl: 0  •  lidocaine (LIDODERM) 5 %, Place 2 patches on the skin as directed by provider Daily. Remove & Discard patch within 12 hours or as directed by MD, Disp: , Rfl:   •  lisinopril (PRINIVIL,ZESTRIL) 10 MG tablet, Take 1 tablet by mouth Daily for 30 days., Disp: 30 tablet, Rfl: 0  •  metoprolol succinate XL (TOPROL-XL) 50 MG 24 hr tablet, Take 1 tablet by mouth Daily for 30 days., Disp: 30 tablet, Rfl: 0  •  thiamine (VITAMIN B-1) 100 MG tablet  tablet, Take 1 tablet by mouth Daily., Disp: 30 tablet, Rfl: 0   also listed in Sleep Questionnaire.    FH: family history is not on file.  SH:  reports that he has been smoking cigarettes. He has a 30.00 pack-year smoking history. He has never used smokeless tobacco. He reports current alcohol use of about 110.0 standard drinks per week. He reports current drug use. Frequency: 7.00 times per week. Drug: Marijuana.    Pertinent Positive Review of Systems of PND  Rest of Review of Systems was negative as recorded in Sleep  "Questionnaire.        Vital Signs: /63   Pulse 71   Ht 180 cm (70.87\")   Wt 83.9 kg (185 lb)   SpO2 95%   BMI 25.90 kg/m²     Body mass index is 25.9 kg/m².       Tongue: Normal       Dentition: good       Pharynx: Posterior pharyngeal pillars are wide   Mallampatti: III (soft and hard palate and base of uvula visible)        General: Alert. Cooperative. Well developed. No acute distress.             Head:  Normocephalic. Symmetrical. Atraumatic.              Eyes: Sclera clear. No icterus. PERRLA. Normal EOM.             Ears: No deformities. Normal hearing.             Nose: No septal deviation. No drainage.          Throat: No oral lesions. No thrush. Moist mucous membranes.    Chest Wall:  Normal shape. Symmetric expansion with respiration. No tenderness.             Neck:  Trachea midline.           Lungs:  Clear to auscultation bilaterally. No wheezes. No rhonchi. No rales. Respirations regular, even and unlabored.            Heart:  Regular rhythm and normal rate. Normal S1 and S2. No murmur.     Abdomen:  Soft, non-tender and non-distended. Normal bowel sounds. No masses.  Extremities:  Moves all extremities well. No edema.           Pulses: Pulses palpable and equal bilaterally.               Skin: Dry. Intact. No bleeding. No rash.           Neuro: Moves all 4 extremities and cranial nerves grossly intact.  Psychiatric: Normal mood and affect.    Impression:  1. Hypersomnolence    2. Loud snoring    3. Diastolic congestive heart failure, unspecified HF chronicity (HCC)    4. MARITA (obstructive sleep apnea)    5. Tobacco abuse          Orders Placed This Encounter   Procedures   • Polysomnography 4 or More Parameters     Standing Status:   Future     Standing Expiration Date:   12/28/2023     Scheduling Instructions:      Patient may take prescribed Ambien if unable to sleep within 30 mins of lights out.      CPAP or BIPAP titration only.      Do not titrate for Central Sleep Apnea. Will need " separate ASV study.     Order Specific Question:   Split Night     Answer:   Yes     Order Specific Question:   AHI greater than or equal to     Answer:   10     Order Specific Question:   May take own meds     Answer:   Yes     Order Specific Question:   Details     Answer:   O2 Implementation per Protocol     Order Specific Question:   Release to patient     Answer:   Routine Release            Plan:  Bogdan requires further evaluation for obstructive sleep apnea.  Given that he was recently in the hospital for CHF and may be currently getting evaluated for coronary artery disease, he requires a study in the lab with PSG.  We will need to get clearance from insurance and thereafter arrange the same.    Patient is a current smoker.  Advised to quit.    This patient has typical features of obstructive sleep apnea with hypersomnolence snoring and apneas along with elevated BMI and classic oropharyngeal structure.  Likelihood of obstructive sleep apnea is high and a polysomnogram study will therefore be requested.      Possible diagnosis and pathophysiology of obstructive sleep apnea was discussed with the patient.  Health risks of untreated obstructive sleep apnea including cardiovascular risks were discussed.  Patient was cautioned about activities requiring full concentration especially driving at night or for longer distances, and until hypersomnolence is corrected.    Results of sleep testing will be reviewed to see if patient is a candidate for CPAP machine.  Alternatives to therapy include oral mandibular advancing device (OMAD) were discussed. However OMAD is usually only beneficial in mild obstructive sleep apnea.  The benefit of weight loss in reducing severity of obstructive sleep apnea was discussed.  Patient would benefit from adhering to a strict diet to achieve ideal BMI.     Patient will follow up in this clinic after testing.    Thank you for allowing me to participate in your patient's  care.    Electronically signed by Marco Gonsalves MD, 12/28/22, 2:13 PM EST.    Part of this note may be an electronic transcription/translation of spoken language to printed text using the Dragon Dictation System.

## 2023-01-04 ENCOUNTER — TRANSCRIBE ORDERS (OUTPATIENT)
Dept: CARDIOLOGY | Facility: CLINIC | Age: 48
End: 2023-01-04
Payer: MEDICAID

## 2023-01-04 DIAGNOSIS — Z01.810 PRE-OPERATIVE CARDIOVASCULAR EXAMINATION: Primary | ICD-10-CM

## 2023-01-04 DIAGNOSIS — Z13.6 SCREENING FOR ISCHEMIC HEART DISEASE: ICD-10-CM

## 2023-01-05 ENCOUNTER — HOSPITAL ENCOUNTER (OUTPATIENT)
Facility: HOSPITAL | Age: 48
Setting detail: HOSPITAL OUTPATIENT SURGERY
Discharge: HOME | End: 2023-01-05
Attending: STUDENT IN AN ORGANIZED HEALTH CARE EDUCATION/TRAINING PROGRAM | Admitting: STUDENT IN AN ORGANIZED HEALTH CARE EDUCATION/TRAINING PROGRAM
Payer: MEDICAID

## 2023-01-05 VITALS
RESPIRATION RATE: 15 BRPM | HEIGHT: 71 IN | TEMPERATURE: 98 F | WEIGHT: 175 LBS | SYSTOLIC BLOOD PRESSURE: 127 MMHG | HEART RATE: 93 BPM | DIASTOLIC BLOOD PRESSURE: 97 MMHG | BODY MASS INDEX: 24.5 KG/M2 | OXYGEN SATURATION: 94 %

## 2023-01-05 DIAGNOSIS — F10.288 ALCOHOL DEPENDENCE WITH OTHER ALCOHOL-INDUCED DISORDER: ICD-10-CM

## 2023-01-05 DIAGNOSIS — G89.29 CHRONIC CHEST PAIN WITH LOW TO MODERATE RISK FOR CAD: ICD-10-CM

## 2023-01-05 DIAGNOSIS — I50.22 CHRONIC SYSTOLIC HEART FAILURE: ICD-10-CM

## 2023-01-05 DIAGNOSIS — R00.0 SINUS TACHYCARDIA: ICD-10-CM

## 2023-01-05 DIAGNOSIS — G47.33 OSA (OBSTRUCTIVE SLEEP APNEA): ICD-10-CM

## 2023-01-05 DIAGNOSIS — Z91.89 CHRONIC CHEST PAIN WITH LOW TO MODERATE RISK FOR CAD: ICD-10-CM

## 2023-01-05 DIAGNOSIS — R07.9 CHRONIC CHEST PAIN WITH LOW TO MODERATE RISK FOR CAD: ICD-10-CM

## 2023-01-05 LAB
ANION GAP SERPL CALCULATED.3IONS-SCNC: 15.5 MMOL/L (ref 5–15)
BASOPHILS # BLD AUTO: 0.11 10*3/MM3 (ref 0–0.2)
BASOPHILS NFR BLD AUTO: 1 % (ref 0–1.5)
BUN SERPL-MCNC: 5 MG/DL (ref 6–20)
BUN/CREAT SERPL: 6.7 (ref 7–25)
CALCIUM SPEC-SCNC: 9.4 MG/DL (ref 8.6–10.5)
CHLORIDE SERPL-SCNC: 100 MMOL/L (ref 98–107)
CO2 SERPL-SCNC: 22.5 MMOL/L (ref 22–29)
CREAT SERPL-MCNC: 0.75 MG/DL (ref 0.76–1.27)
DEPRECATED RDW RBC AUTO: 45.8 FL (ref 37–54)
EGFRCR SERPLBLD CKD-EPI 2021: 111.3 ML/MIN/1.73
EOSINOPHIL # BLD AUTO: 0.26 10*3/MM3 (ref 0–0.4)
EOSINOPHIL NFR BLD AUTO: 2.4 % (ref 0.3–6.2)
ERYTHROCYTE [DISTWIDTH] IN BLOOD BY AUTOMATED COUNT: 13.3 % (ref 12.3–15.4)
GLUCOSE SERPL-MCNC: 93 MG/DL (ref 65–99)
HCT VFR BLD AUTO: 45.9 % (ref 37.5–51)
HCT VFR BLDA CALC: 45 % (ref 38–51)
HCT VFR BLDA CALC: 46 % (ref 38–51)
HGB BLD-MCNC: 16.3 G/DL (ref 13–17.7)
HGB BLDA-MCNC: 15.3 G/DL (ref 12–17)
HGB BLDA-MCNC: 15.6 G/DL (ref 12–17)
IMM GRANULOCYTES # BLD AUTO: 0.07 10*3/MM3 (ref 0–0.05)
IMM GRANULOCYTES NFR BLD AUTO: 0.6 % (ref 0–0.5)
LYMPHOCYTES # BLD AUTO: 2.56 10*3/MM3 (ref 0.7–3.1)
LYMPHOCYTES NFR BLD AUTO: 23.3 % (ref 19.6–45.3)
MCH RBC QN AUTO: 33.5 PG (ref 26.6–33)
MCHC RBC AUTO-ENTMCNC: 35.5 G/DL (ref 31.5–35.7)
MCV RBC AUTO: 94.3 FL (ref 79–97)
MONOCYTES # BLD AUTO: 1.06 10*3/MM3 (ref 0.1–0.9)
MONOCYTES NFR BLD AUTO: 9.7 % (ref 5–12)
NEUTROPHILS NFR BLD AUTO: 6.91 10*3/MM3 (ref 1.7–7)
NEUTROPHILS NFR BLD AUTO: 63 % (ref 42.7–76)
NRBC BLD AUTO-RTO: 0.1 /100 WBC (ref 0–0.2)
PLATELET # BLD AUTO: 258 10*3/MM3 (ref 140–450)
PMV BLD AUTO: 9 FL (ref 6–12)
POTASSIUM SERPL-SCNC: 4 MMOL/L (ref 3.5–5.2)
RBC # BLD AUTO: 4.87 10*6/MM3 (ref 4.14–5.8)
SAO2 % BLDA: 65 % (ref 95–98)
SAO2 % BLDA: 96 % (ref 95–98)
SODIUM SERPL-SCNC: 138 MMOL/L (ref 136–145)
WBC NRBC COR # BLD: 10.97 10*3/MM3 (ref 3.4–10.8)

## 2023-01-05 PROCEDURE — 93460 R&L HRT ART/VENTRICLE ANGIO: CPT | Performed by: STUDENT IN AN ORGANIZED HEALTH CARE EDUCATION/TRAINING PROGRAM

## 2023-01-05 PROCEDURE — S0260 H&P FOR SURGERY: HCPCS | Performed by: STUDENT IN AN ORGANIZED HEALTH CARE EDUCATION/TRAINING PROGRAM

## 2023-01-05 PROCEDURE — 80048 BASIC METABOLIC PNL TOTAL CA: CPT | Performed by: STUDENT IN AN ORGANIZED HEALTH CARE EDUCATION/TRAINING PROGRAM

## 2023-01-05 PROCEDURE — 0 IOPAMIDOL PER 1 ML: Performed by: STUDENT IN AN ORGANIZED HEALTH CARE EDUCATION/TRAINING PROGRAM

## 2023-01-05 PROCEDURE — 85014 HEMATOCRIT: CPT

## 2023-01-05 PROCEDURE — 25010000002 MIDAZOLAM PER 1 MG: Performed by: STUDENT IN AN ORGANIZED HEALTH CARE EDUCATION/TRAINING PROGRAM

## 2023-01-05 PROCEDURE — C1894 INTRO/SHEATH, NON-LASER: HCPCS | Performed by: STUDENT IN AN ORGANIZED HEALTH CARE EDUCATION/TRAINING PROGRAM

## 2023-01-05 PROCEDURE — 85025 COMPLETE CBC W/AUTO DIFF WBC: CPT | Performed by: STUDENT IN AN ORGANIZED HEALTH CARE EDUCATION/TRAINING PROGRAM

## 2023-01-05 PROCEDURE — 85018 HEMOGLOBIN: CPT

## 2023-01-05 PROCEDURE — C1769 GUIDE WIRE: HCPCS | Performed by: STUDENT IN AN ORGANIZED HEALTH CARE EDUCATION/TRAINING PROGRAM

## 2023-01-05 PROCEDURE — 82810 BLOOD GASES O2 SAT ONLY: CPT

## 2023-01-05 PROCEDURE — 25010000002 FENTANYL CITRATE (PF) 50 MCG/ML SOLUTION: Performed by: STUDENT IN AN ORGANIZED HEALTH CARE EDUCATION/TRAINING PROGRAM

## 2023-01-05 PROCEDURE — 25010000002 HEPARIN (PORCINE) PER 1000 UNITS: Performed by: STUDENT IN AN ORGANIZED HEALTH CARE EDUCATION/TRAINING PROGRAM

## 2023-01-05 RX ORDER — SODIUM CHLORIDE 9 MG/ML
75 INJECTION, SOLUTION INTRAVENOUS CONTINUOUS
Status: DISCONTINUED | OUTPATIENT
Start: 2023-01-05 | End: 2023-01-05 | Stop reason: HOSPADM

## 2023-01-05 RX ORDER — SODIUM CHLORIDE 0.9 % (FLUSH) 0.9 %
10 SYRINGE (ML) INJECTION EVERY 12 HOURS SCHEDULED
Status: DISCONTINUED | OUTPATIENT
Start: 2023-01-05 | End: 2023-01-05 | Stop reason: HOSPADM

## 2023-01-05 RX ORDER — FENTANYL CITRATE 50 UG/ML
INJECTION, SOLUTION INTRAMUSCULAR; INTRAVENOUS
Status: DISCONTINUED | OUTPATIENT
Start: 2023-01-05 | End: 2023-01-05 | Stop reason: HOSPADM

## 2023-01-05 RX ORDER — LIDOCAINE HYDROCHLORIDE 20 MG/ML
INJECTION, SOLUTION INFILTRATION; PERINEURAL
Status: DISCONTINUED | OUTPATIENT
Start: 2023-01-05 | End: 2023-01-05 | Stop reason: HOSPADM

## 2023-01-05 RX ORDER — MIDAZOLAM HYDROCHLORIDE 1 MG/ML
INJECTION INTRAMUSCULAR; INTRAVENOUS
Status: DISCONTINUED | OUTPATIENT
Start: 2023-01-05 | End: 2023-01-05 | Stop reason: HOSPADM

## 2023-01-05 RX ORDER — HEPARIN SODIUM 1000 [USP'U]/ML
INJECTION, SOLUTION INTRAVENOUS; SUBCUTANEOUS
Status: DISCONTINUED | OUTPATIENT
Start: 2023-01-05 | End: 2023-01-05 | Stop reason: HOSPADM

## 2023-01-05 RX ORDER — ATORVASTATIN CALCIUM 20 MG/1
20 TABLET, FILM COATED ORAL DAILY
COMMUNITY
End: 2023-03-13 | Stop reason: SDUPTHER

## 2023-01-05 RX ORDER — METOPROLOL SUCCINATE 100 MG/1
100 TABLET, EXTENDED RELEASE ORAL
Qty: 30 TABLET | Refills: 0 | Status: SHIPPED | OUTPATIENT
Start: 2023-01-05 | End: 2023-01-05 | Stop reason: HOSPADM

## 2023-01-05 RX ORDER — VERAPAMIL HYDROCHLORIDE 2.5 MG/ML
INJECTION, SOLUTION INTRAVENOUS
Status: DISCONTINUED | OUTPATIENT
Start: 2023-01-05 | End: 2023-01-05 | Stop reason: HOSPADM

## 2023-01-05 RX ORDER — METOPROLOL SUCCINATE 50 MG/1
100 TABLET, EXTENDED RELEASE ORAL DAILY
Qty: 90 TABLET | Refills: 3 | Status: SHIPPED | OUTPATIENT
Start: 2023-01-05 | End: 2023-03-13 | Stop reason: SDUPTHER

## 2023-01-05 RX ORDER — ACETAMINOPHEN 325 MG/1
650 TABLET ORAL EVERY 4 HOURS PRN
Status: DISCONTINUED | OUTPATIENT
Start: 2023-01-05 | End: 2023-01-05 | Stop reason: HOSPADM

## 2023-01-05 RX ADMIN — SODIUM CHLORIDE 75 ML/HR: 9 INJECTION, SOLUTION INTRAVENOUS at 07:26

## 2023-01-05 NOTE — Clinical Note
Hemostasis started on the right radial artery. R-Band was used in achieving hemostasis. Radial compression device applied to vessel. Hemostasis achieved successfully. Closure device additional comment: TR w/ 13cc air.

## 2023-01-05 NOTE — DISCHARGE INSTRUCTIONS
[facility]  [address] [place] [address]    Coronary Angiogram (Radial/Ulnar Approach) After Care    Refer to this sheet in the next few weeks. These instructions provide you with information on caring for yourself after your procedure. Your caregiver may also give you more specific instructions. Your treatment has been planned according to current medical practices, but problems sometimes occur. Call your caregiver if you have any problems or questions after your procedure.    Home Care Instructions:  You may shower the day after the procedure. Remove the bandage (dressing) and gently wash the site with plain soap and water. Gently pat the site dry. You may apply a band aid daily for 2 days if desired.    Do not apply powder or lotion to the site.  Do not submerge the affected site in water for 3 to 5 days or until the site is completely healed.   Do not lift, push or pull anything over 5 pounds for 5 days after your procedure or as directed by your physician.  As a reference, a gallon of milk weighs 8 pounds.   Inspect the site at least twice daily. You may notice some bruising at the site and it may be tender for 1 to 2 weeks.     Increase your fluid intake for the next 2 days.    Keep arm elevated for 24 hours. For the remainder of the day, keep your arm in “Pledge of Allegiance” position when up and about.     You may drive 24 hours after the procedure unless otherwise instructed by your caregiver.  Do not operate machinery or power tools for 24 hours.  A responsible adult should be with you for the first 24 hours after you arrive home. Do not make any important legal decisions or sign legal papers for 24 hours.  Do not drink alcohol for 24 hours.    Metformin or any medications containing Metformin should not be taken for 48 hours after your procedure.      Call Your Doctor if:   You have unusual pain at the radial/ulnar (wrist) site.  You have redness, warmth, swelling, or pain at the  radial/ulnar (wrist) site.  You have drainage (other than a small amount of blood on the dressing).  `You have chills or a fever > 101.  Your arm becomes pale or dark, cool, tingly, or numb.  You develop chest pain, shortness of breath, feel faint or pass out.    You have heavy bleeding from the site, hold pressure on the site for 20 minutes.  If the bleeding stops, apply a fresh bandage and call your cardiologist.  However, if you        continue to have bleeding, call 911 and continue to apply pressure to the site.   You have any symptoms of a stroke.  Remember BE FAST  B-balance. Sudden trouble walking or loss of balance.  E-eyes.  Sudden changes in how you see or a sudden onset of a very bad headache.   F-face. Sudden weakness or loss of feeling of the face or facial droop on one side.   A-arms Sudden weakness or numbness in one arm.  One arm drifts down if they are both held out in front of you. This happens suddenly and usually on one side of the body.   S-speech.  Sudden trouble speaking, slurred speech or trouble understanding what are saying.   T-time  Time to call emergency services.  Write down the symptoms and the time they started.      [facility]  [address] [place] [address]    Right Heart Cath After Care    Refer to this sheet in the next few weeks. These instructions provide you with information on caring for yourself after your procedure. Your caregiver may also give you more specific instructions. Your treatment has been planned according to current medical practices, but problems sometimes occur. Call your caregiver if you have any problems or questions after your procedure.    Home Care Instructions:  You may shower the day after the procedure. Remove the bandage (dressing) and gently wash the site with plain soap and water. Gently pat the site dry. You may apply a band aid daily for 2 days if desired.    Do not apply powder or lotion to the site until the site is  completely healed.  Do not submerge the affected site in water until the site is completely healed.   No heavy lifting today.   Inspect the site at least twice daily. You may notice some bruising at the site..     If you received sedation a responsible adult should be with you for the first 24 hours after you arrive home. Do not make any important legal decisions or sign legal papers for 24 hours.  Do not drink alcohol for 24 hours.  Do not operate machinery or power tools for 24 hours. You may drive 24 hours after the procedure unless otherwise instructed by your caregiver.        Call Your Doctor if:   You have unusual pain at the puncture site.  You have redness, warmth, swelling, or pain at the puncture site.  You have drainage (other than a small amount of blood on the dressing).  `You have chills or a fever > 101.  Your arm becomes pale or dark, cool, tingly, or numb.  You develop chest pain, shortness of breath, feel faint or pass out.    You have heavy bleeding from the site, hold pressure on the site for 20 minutes.  If the bleeding stops, apply a fresh bandage and call your cardiologist.  However, if you        continue to have bleeding, call 911 and continue to apply pressure to the site.   You have any symptoms of a stroke.  Remember BE FAST  B-balance. Sudden trouble walking or loss of balance.  E-eyes.  Sudden changes in how you see or a sudden onset of a very bad headache.   F-face. Sudden weakness or loss of feeling of the face or facial droop on one side.   A-arms Sudden weakness or numbness in one arm.  One arm drifts down if they are both held out in front of you. This happens suddenly and usually on one side of the body.   S-speech.  Sudden trouble speaking, slurred speech or trouble understanding what are saying.   T-time  Time to call emergency services.  Write down the symptoms and the time they started.

## 2023-01-05 NOTE — Clinical Note
Hemostasis started on the right brachial vein. Manual pressure applied to vessel. Manual pressure was held by RAÚL RTR. Manual pressure was held for 5 min. Hemostasis achieved successfully.

## 2023-01-05 NOTE — Clinical Note
Prepped: right groin, right brachial and Right Wrist. Prepped with: ChloraPrep. The site was clipped.

## 2023-01-05 NOTE — PERIOPERATIVE NURSING NOTE
Pt new metoprolol prescription was sent to [facility] pharmacy, showed a cost, pt sts he does not have money on him at this time to  med, patient asked if it could be sent to different pharmacy for pickup later, new pharmacy added, dr costa made aware.

## 2023-01-05 NOTE — H&P
Date of Hospital Visit: 23  Encounter Provider: Mckinley Franco MD  Place of Service: [facility] CARDIOLOGY  Patient Name: Bogdan Jeter  :1975  [id number]    Chief complaint: Chest pain    History of Present Illness:  47-year-old man with nonischemic cardiomyopathy (improved EF on GDMT), tobacco abuse, alcohol dependence who presents to the ED with chest pain on 2022.  He has had a normal stress test however due to persistent symptoms he was referred for left heart catheterization.    Past Medical History:   Diagnosis Date   • Arrhythmia     SINUS TACHYCARDIA   • CHF (congestive heart failure) (HCC)    • Hyperlipidemia    • Hypertension        History reviewed. No pertinent surgical history.    Medications Prior to Admission   Medication Sig Dispense Refill Last Dose   • atorvastatin (LIPITOR) 20 MG tablet Take 20 mg by mouth Daily.   2023   • lisinopril (PRINIVIL,ZESTRIL) 10 MG tablet Take 1 tablet by mouth Daily for 30 days. 30 tablet 0 2023   • metoprolol succinate XL (TOPROL-XL) 50 MG 24 hr tablet Take 1 tablet by mouth Daily for 30 days. 30 tablet 0 2023       Current Meds  No current facility-administered medications on file prior to encounter.     Current Outpatient Medications on File Prior to Encounter   Medication Sig Dispense Refill   • atorvastatin (LIPITOR) 20 MG tablet Take 20 mg by mouth Daily.     • lisinopril (PRINIVIL,ZESTRIL) 10 MG tablet Take 1 tablet by mouth Daily for 30 days. 30 tablet 0   • metoprolol succinate XL (TOPROL-XL) 50 MG 24 hr tablet Take 1 tablet by mouth Daily for 30 days. 30 tablet 0   • [DISCONTINUED] gabapentin (NEURONTIN) 100 MG capsule Take 1 capsule by mouth Every 12 (Twelve) Hours for 3 days. 6 capsule 0   • [DISCONTINUED] lidocaine (LIDODERM) 5 % Place 2 patches on the skin as directed by provider Daily. Remove & Discard patch within 12 hours or as directed by MD     • [DISCONTINUED] thiamine (VITAMIN B-1) 100 MG  tablet  tablet Take 1 tablet by mouth Daily. 30 tablet 0       Social History     Socioeconomic History   • Marital status: Single   Tobacco Use   • Smoking status: Every Day     Packs/day: 1.00     Years: 30.00     Pack years: 30.00     Types: Cigarettes   • Smokeless tobacco: Never   Vaping Use   • Vaping Use: Never used   Substance and Sexual Activity   • Alcohol use: Yes     Alcohol/week: 110.0 standard drinks     Types: 5 Cans of beer, 105 Drinks containing 0.5 oz of alcohol per week     Comment: COUPLE DRINKS OF WHISKEY AND COKE DAILY   • Drug use: Yes     Frequency: 7.0 times per week     Types: Marijuana     Comment: SOCIALLY   • Sexual activity: Defer       Family Hx: Non-contributory    REVIEW OF SYSTEMS:   ROS was performed and is negative except as outlined in HPI     REVIEW OF SYSTEMS:   CONSTITUTIONAL: No weight loss, fever, chills, weakness or fatigue.   HEENT: Eyes: No visual loss, blurred vision, double vision or yellow sclerae. Ears, Nose, Throat: No hearing loss, sneezing, congestion, runny nose or sore throat.   SKIN: No rash or itching.     RESPIRATORY: No shortness of breath, hemoptysis, cough or sputum.   GASTROINTESTINAL: No anorexia, nausea, vomiting or diarrhea. No abdominal pain, bright red blood per rectum or melena.  NEUROLOGICAL: No headache, dizziness, syncope, paralysis, numbness or tingling in the extremities.  MUSCULOSKELETAL: No muscle, back pain, joint pain or stiffness.   HEMATOLOGIC: No anemia, bleeding or bruising.   LYMPHATICS: No enlarged nodes.  PSYCHIATRIC: No history of depression, anxiety, hallucinations.   ENDOCRINOLOGIC: No reports of sweating, cold or heat intolerance. No polyuria or polydipsia.        Objective:     Vitals:    01/05/23 0716   BP: (!) 162/107   BP Location: Left arm   Patient Position: Lying   Pulse: 118   Resp: 18   Temp: 98 °F (36.7 °C)   TempSrc: Temporal   SpO2: 97%   Weight: 79.4 kg (175 lb)   Height: 180.3 cm (71\")     Body mass index is 24.41  kg/m².  Flowsheet Rows    Flowsheet Row First Filed Value   Admission Height 180.3 cm (71\") Documented at 01/05/2023 0716   Admission Weight 79.4 kg (175 lb) Documented at 01/05/2023 0716          GEN: no distress, alert and oriented  HEENT: NACT, EOMI, moist mucous membranes  Lungs: CTAB, no wheezes, rales or rhonchi  CV: normal rate, regular rhythm, normal S1, S2, no murmurs, +2 radial pulses b/l, no carotid bruit  Abdomen: soft, nontender, nondistended, NABS  Extremities: no edema  Skin: no rash, warm, dry  Heme/Lymph: no bruising  Psych: organized thought, normal behavior and affect           Invalid input(s): LABALBU, PROT        Results from last 7 days   Lab Units 01/05/23  0719   WBC 10*3/mm3 10.97*   HEMOGLOBIN g/dL 16.3   HEMATOCRIT % 45.9   PLATELETS 10*3/mm3 258                 I personally viewed and interpreted the patient's EKG/Telemetry data      Assessment:  Active Hospital Problems    Diagnosis  POA   • MARITA (obstructive sleep apnea) [G47.33]  Unknown   • Chest pain [R07.9]  Unknown   • Alcohol dependence with other alcohol-induced disorder (HCC) [F10.288]  Unknown   • Chronic systolic heart failure (CMS/HCC) [I50.22]  Unknown   • Sinus tachycardia [R00.0]  Unknown      Resolved Hospital Problems   No resolved problems to display.       Plan:  - Right and left heart catheterization for further evaluation of chest pain and dyspnea        Mckinley Franco MD  01/05/23  07:56 EST.

## 2023-03-10 ENCOUNTER — HOSPITAL ENCOUNTER (OUTPATIENT)
Dept: SLEEP MEDICINE | Facility: HOSPITAL | Age: 48
Discharge: HOME | End: 2023-03-10
Admitting: INTERNAL MEDICINE
Payer: MEDICAID

## 2023-03-10 DIAGNOSIS — G47.33 OSA (OBSTRUCTIVE SLEEP APNEA): ICD-10-CM

## 2023-03-10 PROCEDURE — 95810 POLYSOM 6/> YRS 4/> PARAM: CPT

## 2023-03-13 ENCOUNTER — OFFICE VISIT (OUTPATIENT)
Dept: CARDIOLOGY | Facility: CLINIC | Age: 48
End: 2023-03-13
Payer: MEDICAID

## 2023-03-13 VITALS
SYSTOLIC BLOOD PRESSURE: 134 MMHG | OXYGEN SATURATION: 99 % | HEART RATE: 116 BPM | HEIGHT: 71 IN | DIASTOLIC BLOOD PRESSURE: 86 MMHG | BODY MASS INDEX: 25.62 KG/M2 | WEIGHT: 183 LBS | RESPIRATION RATE: 16 BRPM

## 2023-03-13 DIAGNOSIS — G47.33 OSA (OBSTRUCTIVE SLEEP APNEA): ICD-10-CM

## 2023-03-13 DIAGNOSIS — Z72.0 TOBACCO ABUSE: ICD-10-CM

## 2023-03-13 DIAGNOSIS — I50.22 CHRONIC HFREF (HEART FAILURE WITH REDUCED EJECTION FRACTION): Primary | ICD-10-CM

## 2023-03-13 DIAGNOSIS — R00.0 SINUS TACHYCARDIA: ICD-10-CM

## 2023-03-13 PROCEDURE — 1159F MED LIST DOCD IN RCRD: CPT | Performed by: INTERNAL MEDICINE

## 2023-03-13 PROCEDURE — 99213 OFFICE O/P EST LOW 20 MIN: CPT | Performed by: INTERNAL MEDICINE

## 2023-03-13 PROCEDURE — 1160F RVW MEDS BY RX/DR IN RCRD: CPT | Performed by: INTERNAL MEDICINE

## 2023-03-13 PROCEDURE — 93000 ELECTROCARDIOGRAM COMPLETE: CPT | Performed by: INTERNAL MEDICINE

## 2023-03-13 RX ORDER — METOPROLOL SUCCINATE 100 MG/1
100 TABLET, EXTENDED RELEASE ORAL DAILY
Qty: 90 TABLET | Refills: 3 | Status: SHIPPED | OUTPATIENT
Start: 2023-03-13

## 2023-03-13 RX ORDER — LISINOPRIL 10 MG/1
10 TABLET ORAL
Qty: 90 TABLET | Refills: 3 | Status: SHIPPED | OUTPATIENT
Start: 2023-03-13 | End: 2023-04-12

## 2023-03-13 RX ORDER — ATORVASTATIN CALCIUM 20 MG/1
20 TABLET, FILM COATED ORAL DAILY
Qty: 90 TABLET | Refills: 3 | Status: SHIPPED | OUTPATIENT
Start: 2023-03-13

## 2023-03-13 NOTE — PROGRESS NOTES
CARDIOLOGY    Sharona Villagomez MD    ENCOUNTER DATE:  03/13/2023    Bogdan Jeter / 47 y.o. / male        CHIEF COMPLAINT / REASON FOR OFFICE VISIT     Heart Problem (3 MONTH FOLLOW UP)      HISTORY OF PRESENT ILLNESS       HPI    Bogdan Jeter is a 47 y.o. male     This gentleman was admitted to the hospital in 05/2022.  I was consulted for sinus tachycardia associated with alcohol withdrawal.  He had a normal TSH and was not anemic.  He had an echocardiogram at that time, which showed his ejection fraction was like 18%. He was euvolemic on exam.  I started him on metoprolol succinate and ramipril.  He followed up in the office with JESSE Flanagan, in 07/2022.  At that time, he seemed to be doing well.  He had a stress test in 07/2022, which put his ejection fraction at 49% and no evidence of ischemia.    Echocardiogram in November 2022 put his ejection fraction at 35 to 40% with grade 1 diastolic dysfunction and no significant valve disease.  Because of recurring chest pain he had a heart catheterization in January 2023 showing normal coronary arteries and normal right and left side filling pressures.    He has some occasional chest pain and shortness of breath.  No lower extremity edema.  No palpitations.    REVIEW OF SYSTEMS     Review of Systems   Constitutional: Negative for chills, fever, weight gain and weight loss.   Cardiovascular: Negative for leg swelling.   Respiratory: Negative for cough, snoring and wheezing.    Hematologic/Lymphatic: Negative for bleeding problem. Does not bruise/bleed easily.   Skin: Negative for color change.   Musculoskeletal: Negative for falls, joint pain and myalgias.   Gastrointestinal: Negative for melena.   Genitourinary: Negative for hematuria.   Neurological: Negative for excessive daytime sleepiness.   Psychiatric/Behavioral: Negative for depression. The patient is not nervous/anxious.          VITAL SIGNS     Visit Vitals  /86 (BP Location: Right arm,  "Patient Position: Sitting, Cuff Size: Adult)   Pulse 116   Resp 16   Ht 180.3 cm (71\")   Wt 83 kg (183 lb)   SpO2 99%   BMI 25.52 kg/m²         Wt Readings from Last 3 Encounters:   03/13/23 83 kg (183 lb)   01/05/23 79.4 kg (175 lb)   12/28/22 83.9 kg (185 lb)     Body mass index is 25.52 kg/m².      PHYSICAL EXAMINATION     Constitutional:       General: Not in acute distress.  Neck:      Vascular: No carotid bruit or JVD.   Pulmonary:      Effort: Pulmonary effort is normal.      Breath sounds: Normal breath sounds.   Cardiovascular:      Normal rate. Regular rhythm.      Murmurs: There is no murmur.   Psychiatric:         Mood and Affect: Mood and affect normal.           REVIEWED DATA       ECG 12 Lead    Date/Time: 3/13/2023 2:55 PM  Performed by: Sharona Villagomez MD  Authorized by: Sharona Villagomez MD   Comparison: compared with previous ECG from 12/1/2022  Similar to previous ECG  Rhythm: sinus tachycardia  BPM: 116  Conduction: conduction normal  ST Segments: ST segments normal  T Waves: T waves normal    Clinical impression: normal ECG              Lipid Panel    Lipid Panel 11/10/22   Total Cholesterol 197   Triglycerides 63   HDL Cholesterol 71 (A)   VLDL Cholesterol 12   LDL Cholesterol  114 (A)   LDL/HDL Ratio 1.60   (A) Abnormal value              Lab Results   Component Value Date    GLUCOSE 93 01/05/2023    BUN 5 (L) 01/05/2023    CREATININE 0.75 (L) 01/05/2023    EGFR 111.3 01/05/2023    BCR 6.7 (L) 01/05/2023    K 4.0 01/05/2023    CO2 22.5 01/05/2023    CALCIUM 9.4 01/05/2023    ALBUMIN 4.20 12/01/2022    BILITOT <0.2 12/01/2022    AST 34 12/01/2022    ALT 23 12/01/2022       ASSESSMENT & PLAN      Diagnosis Plan   1. Chronic HFrEF (heart failure with reduced ejection fraction) (HCC)        2. MARITA (obstructive sleep apnea)        3. Sinus tachycardia        4. Tobacco abuse              1.  Cardiomyopathy.  Ejection fraction 18% in May 2022 by echo.  Ejection fraction of 49% by nuclear stress " test in July 2022.    Ejection fraction 35 to 40% by echo in September 2022.  Normal coronary arteries by heart catheterization January 2023.  Continue with guideline directed medical therapy.  Euvolemic on exam.  2.  Sinus tachycardia.  3.  Alcohol abuse.  4.  Tobacco use.  He is cutting back and is smoking less than a pack of cigarettes a day.    Follow-up with Aaliyah in 6 months.      Orders Placed This Encounter   Procedures   • ECG 12 Lead     This order was created via procedure documentation     Order Specific Question:   Release to patient     Answer:   Routine Release           MEDICATIONS         Discharge Medications          Accurate as of March 13, 2023  2:56 PM. If you have any questions, ask your nurse or doctor.            Changes to Medications      Instructions Start Date   metoprolol succinate  MG 24 hr tablet  Commonly known as: TOPROL-XL  What changed: medication strength  Changed by: Sharona Villagomez MD   100 mg, Oral, Daily         Continue These Medications      Instructions Start Date   atorvastatin 20 MG tablet  Commonly known as: LIPITOR   20 mg, Oral, Daily      lisinopril 10 MG tablet  Commonly known as: PRINIVIL,ZESTRIL   10 mg, Oral, Every 24 Hours Scheduled               Sharona Villagomez MD  03/13/23  14:56 EDT    Part of this note may be an electronic transcription/translation of spoken language to printed text using the Dragon dictation system.

## 2023-03-14 ENCOUNTER — TELEPHONE (OUTPATIENT)
Dept: SLEEP MEDICINE | Facility: HOSPITAL | Age: 48
End: 2023-03-14
Payer: MEDICAID

## 2023-03-14 DIAGNOSIS — G47.33 OBSTRUCTIVE SLEEP APNEA: Primary | ICD-10-CM

## 2023-03-14 NOTE — TELEPHONE ENCOUNTER
Spoke with patient about sleep study results and drs recommendation for treatment with CPAP . Pt ok to be set up, sending order to [facility] , will schedule follow up once set up on CPAP

## (undated) DEVICE — GW EMR FIX EXCHG J STD .035 3MM 260CM

## (undated) DEVICE — GLIDESHEATH BASIC HYDROPHILIC COATED INTRODUCER SHEATH: Brand: GLIDESHEATH

## (undated) DEVICE — GLIDESHEATH SLENDER STAINLESS STEEL KIT: Brand: GLIDESHEATH SLENDER

## (undated) DEVICE — CATH DIAG DXTERITY ULTRA TRANSRADIAL 4.0 5F 100CM 0/SH

## (undated) DEVICE — KT MANIFLD CARDIAC

## (undated) DEVICE — PK CATH CARD 40

## (undated) DEVICE — BALN PRESS WEDGE 5F 110CM